# Patient Record
Sex: FEMALE | Race: OTHER | HISPANIC OR LATINO
[De-identification: names, ages, dates, MRNs, and addresses within clinical notes are randomized per-mention and may not be internally consistent; named-entity substitution may affect disease eponyms.]

---

## 2019-12-13 PROBLEM — Z00.00 ENCOUNTER FOR PREVENTIVE HEALTH EXAMINATION: Status: ACTIVE | Noted: 2019-12-13

## 2019-12-16 ENCOUNTER — APPOINTMENT (OUTPATIENT)
Dept: NEUROLOGY | Facility: CLINIC | Age: 79
End: 2019-12-16
Payer: MEDICARE

## 2019-12-16 VITALS
BODY MASS INDEX: 26.21 KG/M2 | SYSTOLIC BLOOD PRESSURE: 161 MMHG | OXYGEN SATURATION: 97 % | HEART RATE: 72 BPM | TEMPERATURE: 98.5 F | DIASTOLIC BLOOD PRESSURE: 70 MMHG | WEIGHT: 130 LBS | HEIGHT: 59 IN

## 2019-12-16 DIAGNOSIS — Z81.8 FAMILY HISTORY OF OTHER MENTAL AND BEHAVIORAL DISORDERS: ICD-10-CM

## 2019-12-16 DIAGNOSIS — Z87.820 PERSONAL HISTORY OF TRAUMATIC BRAIN INJURY: ICD-10-CM

## 2019-12-16 PROCEDURE — 99204 OFFICE O/P NEW MOD 45 MIN: CPT

## 2019-12-17 PROBLEM — Z81.8 FAMILY HISTORY OF DEMENTIA: Status: ACTIVE | Noted: 2019-12-17

## 2019-12-17 PROBLEM — Z87.820 HISTORY OF TRAUMATIC BRAIN INJURY: Status: ACTIVE | Noted: 2019-12-17

## 2019-12-17 RX ORDER — CETIRIZINE HCL 10 MG
TABLET ORAL
Refills: 0 | Status: ACTIVE | COMMUNITY

## 2019-12-17 RX ORDER — ALBUTEROL SULFATE 90 UG/1
AEROSOL, METERED RESPIRATORY (INHALATION)
Refills: 0 | Status: ACTIVE | COMMUNITY

## 2019-12-17 RX ORDER — MONTELUKAST SODIUM 10 MG/1
10 TABLET, FILM COATED ORAL
Refills: 0 | Status: ACTIVE | COMMUNITY

## 2019-12-17 RX ORDER — MECLIZINE HYDROCHLORIDE 12.5 MG/1
12.5 TABLET ORAL DAILY
Refills: 0 | Status: ACTIVE | COMMUNITY

## 2019-12-17 NOTE — DISCUSSION/SUMMARY
[FreeTextEntry1] : VIANCA NEVAREZ is a 79 year old F who present with vertigo and short memory difficulties. On exam she endorsed worsening of vertigo to the right on [place] Hallpike testing. The vertigo is positional, whoever sometimes gets worse on standing and sitting up. Her short term memory is impaired however she did well on other memory tasks. Given the age and symptoms, would like to obtain Brain MRI checking for evidence of vascular dementia, obtain MR angiogram of head and neck checking for evidence of stenosis. Will also check lab work up for reversible etiologies of dementia. \par

## 2019-12-17 NOTE — REVIEW OF SYSTEMS
[Memory Lapses or Loss] : memory loss [Repeating Questions] : repeated questioning about recent events [Lightheadedness] : lightheadedness [Vertigo] : vertigo [Sleep Disturbances] : sleep disturbances [Fever] : no fever [Decr. Concentrating Ability] : no decrease in concentrating ability [Chills] : no chills [Difficulty with Language] : no ~M difficulty with language [Changed Thought Patterns] : no change in thought patterns [Facial Weakness] : no facial weakness [Arm Weakness] : no arm weakness [Numbness] : no numbness [Hand Weakness] : no hand weakness [Tingling] : no tingling [Abnormal Sensation] : no abnormal sensation [Seizures] : no convulsions [Hypersensitivity] : no hypersensitivity [Fainting] : no fainting [Suicidal] : not suicidal [Dizziness] : no dizziness [Anxiety] : no anxiety [Depression] : no depression [Heart Rate Is Slow] : the heart rate was not slow [Loss Of Hearing] : no hearing loss [Heart Rate Is Fast] : the heart rate was not fast [Abdominal Pain] : no abdominal pain [Dysuria] : no dysuria [Incontinence] : no incontinence [Skin Wound] : no skin wound [Hot Flashes] : no hot flashes [Proptosis] : no proptosis

## 2019-12-17 NOTE — HISTORY OF PRESENT ILLNESS
[FreeTextEntry1] : VIANCA NEVAREZ is a 79 year old F who presents for memory decline and dizziness. She is accompanied with her daughter. She reports episode of dizziness and room spinning sensation for the past two years. Her PCP prescribed her Meclizine which helps her. Dizziness gets worse with turning her head to the side and sometimes looking up and standing up. She denies falls or fainting spells. In addition she reports stabbing headache and mild blurred vision in the right side 1-2 per month without other associated symptoms. She denies ear pain. \par \par Her daughter also expressed her concern about the patient short term memory difficulties. For the past few months she has difficulties with short memories, remembering her daily tasks. She looses the track of conversation and repeats the questions. Otherwise she is independent and is able to do her ADLs.   \par

## 2019-12-17 NOTE — PHYSICAL EXAM
[FreeTextEntry1] : Mental status: Awake, alert and oriented x3.  [place] speaker. Poor recent memory. Could not remember the three words. Naming, repetition and comprehension intact.  Attention/concentration intact.  No dysarthria, no aphasia.  Fund of knowledge appropriate. \par Cranial nerves: Pupils equally round and reactive to light, visual fields full, Eng gaze  nystagmus karon , [place] Halpike test revealed worsening of symptoms to the right. Extraocular muscles intact, V1 through V3 intact bilaterally and symmetric, face symmetric, hearing intact to finger rub, palate elevation symmetric, tongue was midline.\par Motor:  MRC grading 5/5 b/l UE/LE.   strength 5/5.  Normal tone and bulk.  No abnormal movements.  \par Sensation: Intact to light touch, proprioception, and pinprick. \par Coordination: No dysmetria on finger-to-nose and heel-to-shin.  No dysdiadokinesia.\par Reflexes: 2+ in bilateral UE/LE, downgoing toes bilaterally. (-) Rothman.\par Gait: Narrow and steady. No ataxia.  Romberg negative\par \par

## 2020-02-24 ENCOUNTER — APPOINTMENT (OUTPATIENT)
Dept: NEUROLOGY | Facility: CLINIC | Age: 80
End: 2020-02-24
Payer: MEDICARE

## 2020-02-24 VITALS
OXYGEN SATURATION: 96 % | SYSTOLIC BLOOD PRESSURE: 153 MMHG | TEMPERATURE: 98.2 F | HEART RATE: 77 BPM | DIASTOLIC BLOOD PRESSURE: 75 MMHG | BODY MASS INDEX: 26.21 KG/M2 | HEIGHT: 59 IN | WEIGHT: 130 LBS

## 2020-02-24 DIAGNOSIS — R41.3 OTHER AMNESIA: ICD-10-CM

## 2020-02-24 DIAGNOSIS — R42 DIZZINESS AND GIDDINESS: ICD-10-CM

## 2020-02-24 PROCEDURE — 99214 OFFICE O/P EST MOD 30 MIN: CPT

## 2020-02-24 NOTE — PHYSICAL EXAM
[FreeTextEntry1] : Mental status: Awake, alert and oriented x3. [place] speaker. Poor recent memory. Could not remember the three words. Naming, repetition and comprehension intact. Attention/concentration intact. No dysarthria, no aphasia. Fund of knowledge appropriate. MOCA test: 21/30. She did not well on recalls and visuospatial testing \par Cranial nerves: Pupils equally round and reactive to light, visual fields full, Eng gaze nystagmus karon , [place] Halpike test revealed worsening of symptoms to the right. Extraocular muscles intact, V1 through V3 intact bilaterally and symmetric, face symmetric, hearing intact to finger rub, palate elevation symmetric, tongue was midline.\par Motor: MRC grading 5/5 b/l UE/LE.  strength 5/5. Normal tone and bulk. No abnormal movements. \par Sensation: Intact to light touch, proprioception, and pinprick. \par Coordination: No dysmetria on finger-to-nose and heel-to-shin. No dysdiadokinesia.\par Reflexes: 2+ in bilateral UE/LE, downgoing toes bilaterally. (-) Rothman.\par Gait: Narrow and steady. No ataxia. Romberg negative\par

## 2020-02-24 NOTE — DATA REVIEWED
[de-identified] : Brain MRI: mild cortical volume loss and mild periventricular and subcortical small ischemic changes

## 2020-02-24 NOTE — HISTORY OF PRESENT ILLNESS
[FreeTextEntry1] : 79 year old F is here as a follow up visit for memory decline and dizziness. She reports episode of dizziness and room spinning sensation for the past two years. Her PCP prescribed her Meclizine which helps her. Dizziness gets worse with turning her head to the side and sometimes looking up and standing up.  They also expressed their concern about the patient's short term memory difficulties for the past few months/. She is stable in general but they reports few instances that she had difficulties with short memories and remembering her daily tasks. Since last visit Brain MRI showed mild cortical volume loss and mild periventricular and subcortical small ischemic changes. Her lab work up showed normal B12, TSH and RPR. She still has episodes of worsening memory and occasional dizziness stable with moderate response to meclizine. \par

## 2020-02-24 NOTE — ASSESSMENT
[FreeTextEntry1] : VIANCA NEVAREZ is a 79 year old F who present with vertigo and short memory difficulties. Her Brain MRI showed mild volume loss and white matter disease. Her MOCA test is 21/30. I suspect that part of her memory issue would be due to mood and anxiety for which I would like her to do neuropsychiatric testing. For peripheral vertigo, I would refer her to vestibular rehab and will continue meclizine for symptomatic management \par \par - Vestibular rehab\par - Neuropsychiatric evaluation \par - RTC in 2 months \par  \par

## 2020-03-02 NOTE — REVIEW OF SYSTEMS
-- DO NOT REPLY / DO NOT REPLY ALL --  -- Message is from the [facility]--    General Patient Message      Reason for Call: FYI - patient has 0820 appt tomorrow with you. Patient declined an ED disposition and insisted on scheduling an office appointment. She has no PCP. [phone]. Thank you    Caller Information       Type Contact Phone    03/02/2020 04:43 PM Phone (Incoming) Livier Kearns (Self) [phone] (H)          Alternative phone number: [phone]    Turnaround time given to caller:   \"This message will be sent to [state Provider's name]. The clinical team will fulfill your request as soon as they review your message when the office opens tomorrow.\"}    
[As Noted in HPI] : as noted in HPI

## 2020-03-05 ENCOUNTER — OUTPATIENT (OUTPATIENT)
Dept: OUTPATIENT SERVICES | Facility: HOSPITAL | Age: 80
LOS: 1 days | Discharge: HOME | End: 2020-03-05
Payer: MEDICARE

## 2020-03-05 DIAGNOSIS — R91.8 OTHER NONSPECIFIC ABNORMAL FINDING OF LUNG FIELD: ICD-10-CM

## 2020-03-05 PROCEDURE — 71250 CT THORAX DX C-: CPT | Mod: 26

## 2020-05-04 ENCOUNTER — APPOINTMENT (OUTPATIENT)
Dept: NEUROLOGY | Facility: CLINIC | Age: 80
End: 2020-05-04

## 2020-06-01 ENCOUNTER — APPOINTMENT (OUTPATIENT)
Dept: NEUROLOGY | Facility: CLINIC | Age: 80
End: 2020-06-01

## 2020-10-26 ENCOUNTER — OUTPATIENT (OUTPATIENT)
Dept: OUTPATIENT SERVICES | Facility: HOSPITAL | Age: 80
LOS: 1 days | Discharge: HOME | End: 2020-10-26

## 2020-10-26 DIAGNOSIS — G31.84 MILD COGNITIVE IMPAIRMENT OF UNCERTAIN OR UNKNOWN ETIOLOGY: ICD-10-CM

## 2021-03-04 ENCOUNTER — APPOINTMENT (OUTPATIENT)
Dept: NEUROLOGY | Facility: CLINIC | Age: 81
End: 2021-03-04

## 2021-04-21 DIAGNOSIS — F01.50 VASCULAR DEMENTIA W/OUT BEHAVIORAL DISTURBANCE: ICD-10-CM

## 2021-04-21 RX ORDER — DONEPEZIL HYDROCHLORIDE 5 MG/1
5 TABLET ORAL
Qty: 30 | Refills: 5 | Status: ACTIVE | COMMUNITY
Start: 2021-04-21 | End: 1900-01-01

## 2021-04-21 RX ORDER — ASPIRIN 81 MG/1
81 TABLET, CHEWABLE ORAL
Qty: 90 | Refills: 2 | Status: ACTIVE | COMMUNITY
Start: 2021-04-21 | End: 1900-01-01

## 2022-01-10 ENCOUNTER — APPOINTMENT (OUTPATIENT)
Dept: NEUROLOGY | Facility: CLINIC | Age: 82
End: 2022-01-10

## 2022-06-27 ENCOUNTER — APPOINTMENT (OUTPATIENT)
Dept: ORTHOPEDIC SURGERY | Facility: CLINIC | Age: 82
End: 2022-06-27

## 2022-06-27 PROCEDURE — 73562 X-RAY EXAM OF KNEE 3: CPT | Mod: 50

## 2022-06-27 PROCEDURE — 20611 DRAIN/INJ JOINT/BURSA W/US: CPT | Mod: 50

## 2022-06-27 PROCEDURE — 99213 OFFICE O/P EST LOW 20 MIN: CPT | Mod: 25

## 2022-06-27 RX ORDER — MOMETASONE FUROATE 1 MG/G
0.1 CREAM TOPICAL
Qty: 30 | Refills: 0 | Status: COMPLETED | COMMUNITY
Start: 2021-08-30

## 2022-06-27 RX ORDER — EZETIMIBE 10 MG/1
10 TABLET ORAL
Qty: 90 | Refills: 0 | Status: COMPLETED | COMMUNITY
Start: 2021-09-20

## 2022-06-27 RX ORDER — AZELASTINE HYDROCHLORIDE 137 UG/1
0.1 SPRAY, METERED NASAL
Qty: 30 | Refills: 0 | Status: COMPLETED | COMMUNITY
Start: 2022-01-25

## 2022-06-27 RX ORDER — RAMIPRIL 5 MG/1
5 CAPSULE ORAL
Qty: 90 | Refills: 0 | Status: COMPLETED | COMMUNITY
Start: 2021-09-20

## 2022-06-27 RX ORDER — FLUTICASONE FUROATE AND VILANTEROL TRIFENATATE 100; 25 UG/1; UG/1
100-25 POWDER RESPIRATORY (INHALATION)
Qty: 180 | Refills: 0 | Status: COMPLETED | COMMUNITY
Start: 2021-06-21

## 2022-06-27 NOTE — DATA REVIEWED
[Bilateral] : of the bilateral [Knee] : knee [FreeTextEntry1] :  X-rays of both knees were obtained today.  There were compared to x-rays taken in 2021 of the left knee and 2020 of the right knee.\par \par Right knee shows slightly progressed osteoarthritis in the lateral compartment.  There is spurring off the lateral femoral condyle and the medial tibia.  This x-ray was compared to the right knee x-ray taken from 2020.\par Left knee shows moderate to severe osteoarthritis of the medial compartment.  This has progressed since February 2021 x-ray.

## 2022-06-27 NOTE — DISCUSSION/SUMMARY
[de-identified] : Today we discussed a cortisone injection for each knee.  Both knees were injected with cortisone, procedure note generated.  She will obtain an MRI of each knee.  Rx provided for that.  They will call me 2 days after the MRI is performed so we can discuss the appointment.  She has a follow-up appointment in September 2022 with Dr. Balderas and will keep that appointment.

## 2022-06-27 NOTE — PROCEDURE
[Large Joint Injection] : Large joint injection [Bilateral] : bilaterally of the [Knee] : knee [___ cc    1%] : Lidocaine ~Vcc of 1%  [___ cc    4mg] : Dexamethasone (Decadron) ~Vcc of 4 mg  [All ultrasound images have been permanently captured and stored accordingly in our picture archiving and communication system] : All ultrasound images have been permanently captured and stored accordingly in our picture archiving and communication system [Visualization of the needle and placement of injection was performed without complication] : visualization of the needle and placement of injection was performed without complication

## 2022-06-27 NOTE — PHYSICAL EXAM
[Bilateral] : knee bilaterally [NL (0)] : extension 0 degrees [] : patient ambulates with assistive device [FreeTextEntry8] :  Medial joint tenderness to palpation of both knees, more pronounced on the right knee. [FreeTextEntry9] :   Range of motion assessed with the patient sitting up on the exam table. [TWNoteComboBox7] : flexion 90 degrees

## 2022-09-12 ENCOUNTER — APPOINTMENT (OUTPATIENT)
Dept: ORTHOPEDIC SURGERY | Facility: CLINIC | Age: 82
End: 2022-09-12

## 2022-11-03 DIAGNOSIS — N81.10 CYSTOCELE, UNSPECIFIED: ICD-10-CM

## 2022-11-03 DIAGNOSIS — Z78.9 OTHER SPECIFIED HEALTH STATUS: ICD-10-CM

## 2023-03-08 ENCOUNTER — INPATIENT (INPATIENT)
Facility: HOSPITAL | Age: 83
LOS: 6 days | Discharge: HOME HEALTH | DRG: 306 | End: 2023-03-15
Attending: HOSPITALIST | Admitting: HOSPITALIST
Payer: MEDICARE

## 2023-03-08 VITALS
TEMPERATURE: 98 F | OXYGEN SATURATION: 98 % | DIASTOLIC BLOOD PRESSURE: 67 MMHG | SYSTOLIC BLOOD PRESSURE: 167 MMHG | RESPIRATION RATE: 20 BRPM | HEART RATE: 77 BPM

## 2023-03-08 DIAGNOSIS — R06.02 SHORTNESS OF BREATH: ICD-10-CM

## 2023-03-08 LAB
ALBUMIN SERPL ELPH-MCNC: 4.2 G/DL — SIGNIFICANT CHANGE UP (ref 3.5–5.2)
ALP SERPL-CCNC: 65 U/L — SIGNIFICANT CHANGE UP (ref 30–115)
ALT FLD-CCNC: 19 U/L — SIGNIFICANT CHANGE UP (ref 0–41)
ANION GAP SERPL CALC-SCNC: 11 MMOL/L — SIGNIFICANT CHANGE UP (ref 7–14)
AST SERPL-CCNC: 19 U/L — SIGNIFICANT CHANGE UP (ref 0–41)
BASE EXCESS BLDV CALC-SCNC: -0.9 MMOL/L — SIGNIFICANT CHANGE UP (ref -2–3)
BASOPHILS # BLD AUTO: 0.01 K/UL — SIGNIFICANT CHANGE UP (ref 0–0.2)
BASOPHILS NFR BLD AUTO: 0.2 % — SIGNIFICANT CHANGE UP (ref 0–1)
BILIRUB SERPL-MCNC: 0.3 MG/DL — SIGNIFICANT CHANGE UP (ref 0.2–1.2)
BUN SERPL-MCNC: 24 MG/DL — HIGH (ref 10–20)
CA-I SERPL-SCNC: 1.18 MMOL/L — SIGNIFICANT CHANGE UP (ref 1.15–1.33)
CALCIUM SERPL-MCNC: 8.8 MG/DL — SIGNIFICANT CHANGE UP (ref 8.4–10.4)
CHLORIDE SERPL-SCNC: 103 MMOL/L — SIGNIFICANT CHANGE UP (ref 98–110)
CO2 SERPL-SCNC: 22 MMOL/L — SIGNIFICANT CHANGE UP (ref 17–32)
CREAT SERPL-MCNC: 0.6 MG/DL — LOW (ref 0.7–1.5)
EGFR: 88 ML/MIN/1.73M2 — SIGNIFICANT CHANGE UP
EOSINOPHIL # BLD AUTO: 0.02 K/UL — SIGNIFICANT CHANGE UP (ref 0–0.7)
EOSINOPHIL NFR BLD AUTO: 0.3 % — SIGNIFICANT CHANGE UP (ref 0–8)
FLUAV AG NPH QL: SIGNIFICANT CHANGE UP
FLUBV AG NPH QL: SIGNIFICANT CHANGE UP
GAS PNL BLDV: 134 MMOL/L — LOW (ref 136–145)
GAS PNL BLDV: SIGNIFICANT CHANGE UP
GAS PNL BLDV: SIGNIFICANT CHANGE UP
GLUCOSE SERPL-MCNC: 138 MG/DL — HIGH (ref 70–99)
HCO3 BLDV-SCNC: 24 MMOL/L — SIGNIFICANT CHANGE UP (ref 22–29)
HCT VFR BLD CALC: 32.3 % — LOW (ref 37–47)
HCT VFR BLDA CALC: 34 % — LOW (ref 39–51)
HGB BLD CALC-MCNC: 11.4 G/DL — LOW (ref 12.6–17.4)
HGB BLD-MCNC: 10.8 G/DL — LOW (ref 12–16)
IMM GRANULOCYTES NFR BLD AUTO: 0.2 % — SIGNIFICANT CHANGE UP (ref 0.1–0.3)
LACTATE BLDV-MCNC: 0.9 MMOL/L — SIGNIFICANT CHANGE UP (ref 0.5–2)
LYMPHOCYTES # BLD AUTO: 0.66 K/UL — LOW (ref 1.2–3.4)
LYMPHOCYTES # BLD AUTO: 11.4 % — LOW (ref 20.5–51.1)
MCHC RBC-ENTMCNC: 31.3 PG — HIGH (ref 27–31)
MCHC RBC-ENTMCNC: 33.4 G/DL — SIGNIFICANT CHANGE UP (ref 32–37)
MCV RBC AUTO: 93.6 FL — SIGNIFICANT CHANGE UP (ref 81–99)
MONOCYTES # BLD AUTO: 0.13 K/UL — SIGNIFICANT CHANGE UP (ref 0.1–0.6)
MONOCYTES NFR BLD AUTO: 2.2 % — SIGNIFICANT CHANGE UP (ref 1.7–9.3)
NEUTROPHILS # BLD AUTO: 4.98 K/UL — SIGNIFICANT CHANGE UP (ref 1.4–6.5)
NEUTROPHILS NFR BLD AUTO: 85.7 % — HIGH (ref 42.2–75.2)
NRBC # BLD: 0 /100 WBCS — SIGNIFICANT CHANGE UP (ref 0–0)
NT-PROBNP SERPL-SCNC: 1085 PG/ML — HIGH (ref 0–300)
PCO2 BLDV: 37 MMHG — LOW (ref 39–42)
PH BLDV: 7.41 — SIGNIFICANT CHANGE UP (ref 7.32–7.43)
PLATELET # BLD AUTO: 174 K/UL — SIGNIFICANT CHANGE UP (ref 130–400)
PO2 BLDV: 74 MMHG — SIGNIFICANT CHANGE UP
POTASSIUM BLDV-SCNC: 4.5 MMOL/L — SIGNIFICANT CHANGE UP (ref 3.5–5.1)
POTASSIUM SERPL-MCNC: 4.5 MMOL/L — SIGNIFICANT CHANGE UP (ref 3.5–5)
POTASSIUM SERPL-SCNC: 4.5 MMOL/L — SIGNIFICANT CHANGE UP (ref 3.5–5)
PROT SERPL-MCNC: 7.1 G/DL — SIGNIFICANT CHANGE UP (ref 6–8)
RBC # BLD: 3.45 M/UL — LOW (ref 4.2–5.4)
RBC # FLD: 13.6 % — SIGNIFICANT CHANGE UP (ref 11.5–14.5)
RSV RNA NPH QL NAA+NON-PROBE: SIGNIFICANT CHANGE UP
SAO2 % BLDV: 96.1 % — SIGNIFICANT CHANGE UP
SARS-COV-2 RNA SPEC QL NAA+PROBE: SIGNIFICANT CHANGE UP
SODIUM SERPL-SCNC: 136 MMOL/L — SIGNIFICANT CHANGE UP (ref 135–146)
TROPONIN T SERPL-MCNC: <0.01 NG/ML — SIGNIFICANT CHANGE UP
WBC # BLD: 5.81 K/UL — SIGNIFICANT CHANGE UP (ref 4.8–10.8)
WBC # FLD AUTO: 5.81 K/UL — SIGNIFICANT CHANGE UP (ref 4.8–10.8)

## 2023-03-08 PROCEDURE — 80061 LIPID PANEL: CPT

## 2023-03-08 PROCEDURE — 85610 PROTHROMBIN TIME: CPT

## 2023-03-08 PROCEDURE — 84100 ASSAY OF PHOSPHORUS: CPT

## 2023-03-08 PROCEDURE — 83036 HEMOGLOBIN GLYCOSYLATED A1C: CPT

## 2023-03-08 PROCEDURE — 93306 TTE W/DOPPLER COMPLETE: CPT

## 2023-03-08 PROCEDURE — 71045 X-RAY EXAM CHEST 1 VIEW: CPT

## 2023-03-08 PROCEDURE — 85652 RBC SED RATE AUTOMATED: CPT

## 2023-03-08 PROCEDURE — 80053 COMPREHEN METABOLIC PANEL: CPT

## 2023-03-08 PROCEDURE — 93005 ELECTROCARDIOGRAM TRACING: CPT

## 2023-03-08 PROCEDURE — 93010 ELECTROCARDIOGRAM REPORT: CPT

## 2023-03-08 PROCEDURE — 97110 THERAPEUTIC EXERCISES: CPT | Mod: GP

## 2023-03-08 PROCEDURE — 99285 EMERGENCY DEPT VISIT HI MDM: CPT | Mod: FS

## 2023-03-08 PROCEDURE — 83735 ASSAY OF MAGNESIUM: CPT

## 2023-03-08 PROCEDURE — 93970 EXTREMITY STUDY: CPT

## 2023-03-08 PROCEDURE — 93320 DOPPLER ECHO COMPLETE: CPT

## 2023-03-08 PROCEDURE — 99222 1ST HOSP IP/OBS MODERATE 55: CPT | Mod: GC

## 2023-03-08 PROCEDURE — U0005: CPT

## 2023-03-08 PROCEDURE — 82652 VIT D 1 25-DIHYDROXY: CPT

## 2023-03-08 PROCEDURE — 85730 THROMBOPLASTIN TIME PARTIAL: CPT

## 2023-03-08 PROCEDURE — 94640 AIRWAY INHALATION TREATMENT: CPT

## 2023-03-08 PROCEDURE — 82306 VITAMIN D 25 HYDROXY: CPT

## 2023-03-08 PROCEDURE — 97162 PT EVAL MOD COMPLEX 30 MIN: CPT | Mod: GP

## 2023-03-08 PROCEDURE — 85025 COMPLETE CBC W/AUTO DIFF WBC: CPT

## 2023-03-08 PROCEDURE — 86140 C-REACTIVE PROTEIN: CPT

## 2023-03-08 PROCEDURE — U0003: CPT

## 2023-03-08 PROCEDURE — 97116 GAIT TRAINING THERAPY: CPT | Mod: GP

## 2023-03-08 PROCEDURE — 93325 DOPPLER ECHO COLOR FLOW MAPG: CPT

## 2023-03-08 PROCEDURE — 71045 X-RAY EXAM CHEST 1 VIEW: CPT | Mod: 26

## 2023-03-08 PROCEDURE — 93312 ECHO TRANSESOPHAGEAL: CPT

## 2023-03-08 PROCEDURE — 84484 ASSAY OF TROPONIN QUANT: CPT

## 2023-03-08 PROCEDURE — 36415 COLL VENOUS BLD VENIPUNCTURE: CPT

## 2023-03-08 RX ORDER — IPRATROPIUM/ALBUTEROL SULFATE 18-103MCG
3 AEROSOL WITH ADAPTER (GRAM) INHALATION
Refills: 0 | Status: COMPLETED | OUTPATIENT
Start: 2023-03-08 | End: 2023-03-08

## 2023-03-08 RX ORDER — FUROSEMIDE 40 MG
40 TABLET ORAL ONCE
Refills: 0 | Status: COMPLETED | OUTPATIENT
Start: 2023-03-08 | End: 2023-03-08

## 2023-03-08 RX ORDER — MECLIZINE HCL 12.5 MG
25 TABLET ORAL DAILY
Refills: 0 | Status: DISCONTINUED | OUTPATIENT
Start: 2023-03-08 | End: 2023-03-15

## 2023-03-08 RX ORDER — ALBUTEROL 90 UG/1
2 AEROSOL, METERED ORAL EVERY 6 HOURS
Refills: 0 | Status: DISCONTINUED | OUTPATIENT
Start: 2023-03-08 | End: 2023-03-15

## 2023-03-08 RX ORDER — MAGNESIUM SULFATE 500 MG/ML
2 VIAL (ML) INJECTION ONCE
Refills: 0 | Status: COMPLETED | OUTPATIENT
Start: 2023-03-08 | End: 2023-03-08

## 2023-03-08 RX ORDER — IPRATROPIUM/ALBUTEROL SULFATE 18-103MCG
3 AEROSOL WITH ADAPTER (GRAM) INHALATION EVERY 6 HOURS
Refills: 0 | Status: DISCONTINUED | OUTPATIENT
Start: 2023-03-08 | End: 2023-03-15

## 2023-03-08 RX ORDER — ENOXAPARIN SODIUM 100 MG/ML
40 INJECTION SUBCUTANEOUS EVERY 24 HOURS
Refills: 0 | Status: DISCONTINUED | OUTPATIENT
Start: 2023-03-08 | End: 2023-03-15

## 2023-03-08 RX ORDER — PANTOPRAZOLE SODIUM 20 MG/1
40 TABLET, DELAYED RELEASE ORAL
Refills: 0 | Status: DISCONTINUED | OUTPATIENT
Start: 2023-03-08 | End: 2023-03-15

## 2023-03-08 RX ORDER — FUROSEMIDE 40 MG
40 TABLET ORAL EVERY 12 HOURS
Refills: 0 | Status: DISCONTINUED | OUTPATIENT
Start: 2023-03-08 | End: 2023-03-11

## 2023-03-08 RX ORDER — SENNA PLUS 8.6 MG/1
2 TABLET ORAL AT BEDTIME
Refills: 0 | Status: DISCONTINUED | OUTPATIENT
Start: 2023-03-08 | End: 2023-03-15

## 2023-03-08 RX ADMIN — Medication 3 MILLILITER(S): at 17:01

## 2023-03-08 RX ADMIN — Medication 3 MILLILITER(S): at 15:11

## 2023-03-08 RX ADMIN — Medication 40 MILLIGRAM(S): at 17:01

## 2023-03-08 RX ADMIN — Medication 150 GRAM(S): at 17:02

## 2023-03-08 RX ADMIN — Medication 3 MILLILITER(S): at 20:08

## 2023-03-08 RX ADMIN — Medication 3 MILLILITER(S): at 16:55

## 2023-03-08 RX ADMIN — Medication 125 MILLIGRAM(S): at 15:11

## 2023-03-08 RX ADMIN — Medication 3 MILLILITER(S): at 16:51

## 2023-03-08 NOTE — H&P ADULT - HISTORY OF PRESENT ILLNESS
*[place] speaker, interpretation by daughter at bedside*    83-year-old female with history of asthma, vertigo presents for SOB. At baseline, patient uses a walker for ambulation. As per patient no history of previous asthma exacerbation or intubations. No personal history of smoking, no exposure to second hand smoke.    Patient was in her usual state of health 3 days ago when she started feeling SOB (increases with exertion, relieved at rest), associated with cough w/yellow phlegm, headache (mild to moderate, increases with coughing, band like, new), chest pain (central location, feels like blowing up, occurs only with cough, not associated with exertion or rest, 7/10 at it's worst), lower extremity swelling. Denies Fever, chills, rigors, dizziness, NVD, abdominal pain, change in urination and change in bowel habits.     2 days ago patient was prescribed prednisone by her PCP which was initially helping with symptoms but today symptoms progressed and are at it's worst, for which she presented to ED. Negative flu/COVID test OP.    Patient endorsing that she had a heart scan done which showed leaky valve.    In the ED, CXR shows L>R pleural effusion and congestion, BNP 1085, Trops -ve, pCO2 37 on VBG. Clinically looks non-toxic on RA.  Vital Signs Last 24 Hrs:  T(F): 98.1 (08 Mar 2023 12:38), Max: 98.1 (08 Mar 2023 12:38)  HR: 77 (08 Mar 2023 12:38) (77 - 77)  BP: 167/67 (08 Mar 2023 12:38) (167/67 - 167/67)  RR: 20 (08 Mar 2023 12:38) (20 - 20)  SpO2: 98% (08 Mar 2023 12:38) (98% - 98%) on RA

## 2023-03-08 NOTE — H&P ADULT - NSTOBACCOSCREENHP_GEN_A_NCS
Left detailed message regarding our inability to write the letter he's requesting--on Jon's designated phone, as directed by Jon.     No

## 2023-03-08 NOTE — H&P ADULT - NSHPPHYSICALEXAM_GEN_ALL_CORE
GENERAL: NAD, lying in bed comfortably  CHEST/LUNG:  Unlabored respirations. B/L wheezing and crackles +ve  HEART: Regular rate and rhythm; No murmurs, rubs, or gallops  ABDOMEN: Soft, Nontender, Nondistended  EXTREMITIES:  No clubbing, cyanosis, or edema  NERVOUS SYSTEM:  Alert & Oriented X3, speech clear. No deficits

## 2023-03-08 NOTE — ED PROVIDER NOTE - PHYSICAL EXAMINATION
Vital Signs: I have reviewed the initial vital signs.  Constitutional: NAD, well-nourished, appears stated age, no acute distress.  HEENT: Airway patent, moist MM, no erythema/swelling/deformity of oral structures. EOMI, PERRLA.  CV: regular rate, regular rhythm, well-perfused extremities, 2+ b/l DP and radial pulses equal.  Lungs: +diffuse crackles/wheezing, no increased WOB.  ABD: NTND, no guarding or rebound, no pulsatile mass, no hernias.   MSK: Neck supple, nontender, nl ROM, no stepoff. Chest nontender. Back nontender. Ext nontender, nl rom, no deformity.   INTEG: Skin warm, dry, no rash.  NEURO: A&Ox3, normal strength, nl sensation throughout, normal speech.   PSYCH: Calm, cooperative, normal affect and interaction.

## 2023-03-08 NOTE — ED PROVIDER NOTE - ATTENDING APP SHARED VISIT CONTRIBUTION OF CARE
83-year-old female with history of vertigo, asthma on daily inhaler presents today with shortness of breath.  Endorses 4 days of shortness of breath as well as productive cough with yellow sputum.  Denies any fevers.  Denies any chest pain.  Went to her PMD who prescribed prednisone as well as albuterol pump.  Patient has been taking it as prescribed without much improvement.    CONSTITUTIONAL: Well-developed; well-nourished; in no acute distress.   SKIN: warm, dry  HEAD: Normocephalic; atraumatic.  EYES: PERRL, EOMI, no conjunctival erythema  ENT: No nasal discharge; airway clear.  NECK: Supple; non tender.  CARD: S1, S2 normal; no murmurs, gallops, or rubs. Regular rate and rhythm.   RESP: mild tachypnea, speaking full sentences, wheezing bilaterally.   ABD: soft ntnd.  EXT: Normal ROM.  1+ pitting edema to the LEs.   NEURO: Alert, oriented, grossly unremarkable  PSYCH: Cooperative, appropriate.

## 2023-03-08 NOTE — H&P ADULT - ASSESSMENT
83-year-old female with history of asthma, vertigo presents for SOB. At baseline, patient uses a walker for ambulation. As per patient no history of previous asthma exacerbation or intubations. No personal history of smoking, no exposure to second hand smoke.    #Suspected Asthma exacerbation  -H/o chronic controlled Asthma as per patient  -No exposure to smoke, VBG shows normal Co2, but wheezing (cardiac origin ?)  -s/p Magnesium, Solumedrol 125mg  -Start Solumedrol 60mg IV BID  -Start Duoneb q6, Albuterol PRN  -Vitamin D level    #Suspected new onset HF  -BNP 1085, CXR shows L>R pleural effusion and cardiomegaly  -Trops -ve, EKG shows tall T wave in lead 1 and 2, possible LVH  -Trops AM, Repeat EKG  -TTE  -Start Lasix 40mg BID  -Strict I&Os  -Tele monitoring for IV diuresis  -Will need ischemic workup OP  -Repeat CXR, if L effusion does not resolve will need diagnostic tap    #Misc  -DVT prophylaxis: Lovenox SQ  -GI prophylaxis: Protonix  -Diet: Regular  -Code status: Full code  -Activity: IAT  -Bowel regimen: Senna  -Dispo: Tele    -Med rec confirmed with patient 83-year-old female with history of asthma, vertigo presents for SOB. At baseline, patient uses a walker for ambulation. As per patient no history of previous asthma exacerbation or intubations. No personal history of smoking, no exposure to second hand smoke.    #Suspected COPD/Asthma exacerbation  -H/o chronic controlled Asthma as per patient  -No exposure to smoke, VBG shows normal Co2, but wheezing (cardiac origin ?)  -s/p Magnesium, Solumedrol 125mg  -Start Solumedrol 60mg IV BID  -Start Duoneb q6, Albuterol PRN  -Vitamin D level  -Pulmonary c/s Dr. Marah Cuevas    #Suspected new onset HF  -BNP 1085, CXR shows L>R pleural effusion and cardiomegaly  -Trops -ve, EKG shows tall T wave in lead 1 and 2, possible LVH  -Trops AM, Repeat EKG  -Stress test from this year -ve as per daughter  -TTE OP from 2/2023 shows severe MR- follow with Dr. Hatfield  -TTE  -Start Lasix 40mg BID  -Strict I&Os  -Tele monitoring for IV diuresis  -Will need ischemic workup OP  -Repeat CXR, if L effusion does not resolve will need diagnostic tap  -Cardiology c/s Dr. Hatfield for MV clip/repair/replacement    #Misc  -DVT prophylaxis: Lovenox SQ  -GI prophylaxis: Protonix  -Diet: Regular  -Code status: Full code  -Activity: IAT  -Bowel regimen: Senna  -Dispo: Tele    -Med rec confirmed with patient 83-year-old female with history of asthma, vertigo presents for SOB. At baseline, patient uses a walker for ambulation. As per patient no history of previous asthma exacerbation or intubations. No personal history of smoking, no exposure to second hand smoke.    #Suspected COPD/Asthma exacerbation  -H/o chronic controlled Asthma as per patient  -No exposure to smoke, VBG shows normal Co2, but wheezing (cardiac origin ?)  -s/p Magnesium, Solumedrol 125mg  -Start Solumedrol 60mg IV BID  -Start Duoneb q6, Albuterol PRN  -Vitamin D level  -Pulmonary c/s Dr. Marah Cuevas    #Suspected new onset HF  -BNP 1085, CXR shows L>R trace pleural effusion and cardiomegaly on wet read- f/u official  -Trops -ve, EKG shows tall T wave in lead 1 and 2, possible LVH  -Trops AM, Repeat EKG  -Stress test from this year -ve as per daughter  -TTE OP from 2/2023 shows severe MR- follow with Dr. Hatfield  -TTE  -Start Lasix 40mg BID  -Strict I&Os  -Tele monitoring for IV diuresis  -Will need ischemic workup OP  -Repeat CXR, if L effusion does not resolve will need diagnostic tap  -Cardiology c/s Dr. Hatfield for MV clip/repair/replacement    #Misc  -DVT prophylaxis: Lovenox SQ  -GI prophylaxis: Protonix  -Diet: Regular  -Code status: Full code  -Activity: IAT  -Bowel regimen: Senna  -Dispo: Tele    -Med rec confirmed with patient

## 2023-03-08 NOTE — ED ADULT NURSE NOTE - OBJECTIVE STATEMENT
hx of asthma, ran out of albuterol pump  speaks full sentences, resting on the stretcher  denies n/v/d/

## 2023-03-08 NOTE — ED PROVIDER NOTE - CLINICAL SUMMARY MEDICAL DECISION MAKING FREE TEXT BOX
83-year-old female with history of vertigo and asthma presenting with 4 days of shortness of breath associated with cough and wheeze.  Already has tried oral prednisone for the last 2 days, albuterol as needed without improvement.  Noted with tachypnea, wheezing. Labs with normal CBC/cmp, elevated BNP, CXR with vascular congestion. noted with pitting edema to LEs. possible CHF, given lasix IV. Treated with albuterol, solumedrol with mild improvement however persistently symptomatic-  admitted for further management.

## 2023-03-08 NOTE — ED ADULT NURSE NOTE - CAS EDN DISCHARGE ASSESSMENT
Alert and oriented to person, place and time v/s stable, patient able to ambulate from stretcher to bed/Alert and oriented to person, place and time/No adverse reaction to first time med in ED

## 2023-03-08 NOTE — H&P ADULT - ATTENDING COMMENTS
HPI:  *Nepali speaker, interpretation by daughter at bedside*    83-year-old female with history of asthma, vertigo presents for SOB. At baseline, patient uses a walker for ambulation. As per patient no history of previous asthma exacerbation or intubations. No personal history of smoking, no exposure to second hand smoke.    Patient was in her usual state of health 3 days ago when she started feeling SOB (increases with exertion, relieved at rest), associated with cough w/yellow phlegm, headache (mild to moderate, increases with coughing, band like, new), chest pain (central location, feels like blowing up, occurs only with cough, not associated with exertion or rest, 7/10 at it's worst), lower extremity swelling. Denies Fever, chills, rigors, dizziness, NVD, abdominal pain, change in urination and change in bowel habits.     2 days ago patient was prescribed prednisone by her PCP which was initially helping with symptoms but today symptoms progressed and are at it's worst, for which she presented to ED. Negative flu/COVID test OP.    Patient endorsing that she had a heart scan done which showed leaky valve.    In the ED, CXR shows L>R pleural effusion and congestion, BNP 1085, Trops -ve, pCO2 37 on VBG. Clinically looks non-toxic on RA.  Vital Signs Last 24 Hrs:  T(F): 98.1 (08 Mar 2023 12:38), Max: 98.1 (08 Mar 2023 12:38)  HR: 77 (08 Mar 2023 12:38) (77 - 77)  BP: 167/67 (08 Mar 2023 12:38) (167/67 - 167/67)  RR: 20 (08 Mar 2023 12:38) (20 - 20)  SpO2: 98% (08 Mar 2023 12:38) (98% - 98%) on RA   (08 Mar 2023 21:25)    REVIEW OF SYSTEMS: see cc/HPI   CONSTITUTIONAL: No weakness, fevers or chills  EYES/ENT: No visual changes;  No vertigo or throat pain   NECK: No pain or stiffness  RESPIRATORY: (+) cough, wheezing, hemoptysis; (+) shortness of breath  CARDIOVASCULAR: No chest pain or palpitations  GASTROINTESTINAL: No abdominal or epigastric pain. No nausea, vomiting, or hematemesis; No diarrhea or constipation. No melena or hematochezia.  GENITOURINARY: No dysuria, frequency or hematuria  NEUROLOGICAL: No numbness or weakness  SKIN: No itching, rashes    Physical Exam:  General: WN/WD NAD  Neurology: A&Ox3, nonfocal, follows commands  Eyes: PERRLA/ EOMI  ENT/Neck: Neck supple, trachea midline, No JVD  Respiratory: CTA B/L, No wheezing, rales, rhonchi  CV: Normal rate regular rhythm, S1S2, no murmurs, rubs or gallops  Abdominal: Soft, NT, ND +BS,   Extremities: No edema, + peripheral pulses  Skin: No Rashes, Hematoma, Ecchymosis  Incisions:   Tubes:    A/p HPI:  *Sinhala speaker, interpretation by daughter at bedside*    83-year-old female with history of asthma, vertigo presents for SOB. At baseline, patient uses a walker for ambulation. As per patient no history of previous asthma exacerbation or intubations. No personal history of smoking, no exposure to second hand smoke.    Patient was in her usual state of health 3 days ago when she started feeling SOB (increases with exertion, relieved at rest), associated with cough w/yellow phlegm, headache (mild to moderate, increases with coughing, band like, new), chest pain (central location, feels like blowing up, occurs only with cough, not associated with exertion or rest, 7/10 at it's worst), lower extremity swelling. Denies Fever, chills, rigors, dizziness, NVD, abdominal pain, change in urination and change in bowel habits.     2 days ago patient was prescribed prednisone by her PCP which was initially helping with symptoms but today symptoms progressed and are at it's worst, for which she presented to ED. Negative flu/COVID test OP.    Patient endorsing that she had a heart scan done which showed leaky valve.    In the ED, CXR shows L>R pleural effusion and congestion, BNP 1085, Trops -ve, pCO2 37 on VBG. Clinically looks non-toxic on RA.  Vital Signs Last 24 Hrs:  T(F): 98.1 (08 Mar 2023 12:38), Max: 98.1 (08 Mar 2023 12:38)  HR: 77 (08 Mar 2023 12:38) (77 - 77)  BP: 167/67 (08 Mar 2023 12:38) (167/67 - 167/67)  RR: 20 (08 Mar 2023 12:38) (20 - 20)  SpO2: 98% (08 Mar 2023 12:38) (98% - 98%) on RA   (08 Mar 2023 21:25)    REVIEW OF SYSTEMS: see cc/HPI   CONSTITUTIONAL: No weakness, fevers or chills  EYES/ENT: No visual changes;  No vertigo or throat pain   NECK: No pain or stiffness  RESPIRATORY: (+) cough, wheezing, hemoptysis; (+) shortness of breath  CARDIOVASCULAR: No chest pain or palpitations  GASTROINTESTINAL: No abdominal or epigastric pain. No nausea, vomiting, or hematemesis; No diarrhea or constipation. No melena or hematochezia.  GENITOURINARY: No dysuria, frequency or hematuria  NEUROLOGICAL: No numbness or weakness  SKIN: No itching, rashes    Physical Exam:  General: WN/WD NAD  Neurology: A&Ox3, nonfocal, follows commands  Eyes: PERRLA/ EOMI  ENT/Neck: Neck supple, trachea midline, No JVD  Respiratory: B/L rales and rhonchi, No wheezing,   CV: Normal rate regular rhythm, S1S2, no murmurs, rubs or gallops  Abdominal: Soft, NT, ND +BS,   Extremities: No edema, + peripheral pulses  Skin: No Rashes, Hematoma, Ecchymosis  Incisions:   Tubes:    A/p  Suspected HFpEF 2/2 severe mitral valve dysfunction   -admit to tele   -IV Lasix   -Is and Os and daily weights   -Fluid restrictions   -Cardiology eval   -Pulm eval - patient request     Dyspnea - recently has an OP Cardiology w/u, EF preserved.  Valvular heart disease     PATIENT SEEN by ATTENDING 3/8/23 ( note revised 3/9) HPI:  *Icelandic speaker, interpretation by daughter at bedside*    83-year-old female with history of asthma, vertigo presents for SOB. At baseline, patient uses a walker for ambulation. As per patient no history of previous asthma exacerbation or intubations. No personal history of smoking, no exposure to second hand smoke.    Patient was in her usual state of health 3 days ago when she started feeling SOB (increases with exertion, relieved at rest), associated with cough w/yellow phlegm, headache (mild to moderate, increases with coughing, band like, new), chest pain (central location, feels like blowing up, occurs only with cough, not associated with exertion or rest, 7/10 at it's worst), lower extremity swelling. Denies Fever, chills, rigors, dizziness, NVD, abdominal pain, change in urination and change in bowel habits.     2 days ago patient was prescribed prednisone by her PCP which was initially helping with symptoms but today symptoms progressed and are at it's worst, for which she presented to ED. Negative flu/COVID test OP.    Patient endorsing that she had a heart scan done which showed leaky valve.    In the ED, CXR shows L>R pleural effusion and congestion, BNP 1085, Trops -ve, pCO2 37 on VBG. Clinically looks non-toxic on RA.  Vital Signs Last 24 Hrs:  T(F): 98.1 (08 Mar 2023 12:38), Max: 98.1 (08 Mar 2023 12:38)  HR: 77 (08 Mar 2023 12:38) (77 - 77)  BP: 167/67 (08 Mar 2023 12:38) (167/67 - 167/67)  RR: 20 (08 Mar 2023 12:38) (20 - 20)  SpO2: 98% (08 Mar 2023 12:38) (98% - 98%) on RA   (08 Mar 2023 21:25)    REVIEW OF SYSTEMS: see cc/HPI   CONSTITUTIONAL: No weakness, fevers or chills  EYES/ENT: No visual changes;  No vertigo or throat pain   NECK: No pain or stiffness  RESPIRATORY: (+) cough, wheezing, hemoptysis; (+) shortness of breath  CARDIOVASCULAR: No chest pain or palpitations  GASTROINTESTINAL: No abdominal or epigastric pain. No nausea, vomiting, or hematemesis; No diarrhea or constipation. No melena or hematochezia.  GENITOURINARY: No dysuria, frequency or hematuria  NEUROLOGICAL: No numbness or weakness  SKIN: No itching, rashes    Physical Exam:  General: WN/WD NAD  Neurology: A&Ox3, nonfocal, follows commands  Eyes: PERRLA/ EOMI  ENT/Neck: Neck supple, trachea midline, No JVD  Respiratory: B/L rales and rhonchi, No wheezing,   CV: Normal rate regular rhythm, S1S2, no murmurs, rubs or gallops  Abdominal: Soft, NT, ND +BS,   Extremities: No edema, + peripheral pulses  Skin: No Rashes, Hematoma, Ecchymosis  Incisions:   Tubes:    A/p  Suspected HFpEF 2/2 severe mitral valve dysfunction ( valvular calcifications and regurgitation)  -admit to tele   -IV Lasix   -Is and Os and daily weights   -Fluid restrictions   -Cardiology eval - seen recently for review of NST (Negative)  and Echo reports   -Pulm eval - patient request     Asthma - doubt exacerbation but wheezing earlier  -Taper IV steroids ---> pred   -c/w bronchodilators    Dyspnea - recently has an OP Cardiology w/u, EF preserved.  Valvular heart disease   -As above w/ Cardiology eval     PATIENT SEEN by ATTENDING 3/8/23 ( note revised 3/9)    Spoke to patient and daughter in Icelandic

## 2023-03-08 NOTE — ED PROVIDER NOTE - OBJECTIVE STATEMENT
83-year-old female with history of asthma, vertigo presents to the ED, accompanied by family complaining of shortness of breath, productive cough with yellow phlegm and chest pain with cough since Sunday.  Patient was seen on Monday by PMD and had negative COVID/flu testing and was started on prednisone.  Patient states has been using her Breo and albuterol inhaler without any improvement.  No fever, chills, leg pain, nausea, vomiting or weakness.

## 2023-03-09 LAB
24R-OH-CALCIDIOL SERPL-MCNC: 29 NG/ML — LOW (ref 30–80)
A1C WITH ESTIMATED AVERAGE GLUCOSE RESULT: 5.8 % — HIGH (ref 4–5.6)
ALBUMIN SERPL ELPH-MCNC: 4.1 G/DL — SIGNIFICANT CHANGE UP (ref 3.5–5.2)
ALP SERPL-CCNC: 67 U/L — SIGNIFICANT CHANGE UP (ref 30–115)
ALT FLD-CCNC: 16 U/L — SIGNIFICANT CHANGE UP (ref 0–41)
ANION GAP SERPL CALC-SCNC: 13 MMOL/L — SIGNIFICANT CHANGE UP (ref 7–14)
APTT BLD: 27.7 SEC — SIGNIFICANT CHANGE UP (ref 27–39.2)
AST SERPL-CCNC: 15 U/L — SIGNIFICANT CHANGE UP (ref 0–41)
BASOPHILS # BLD AUTO: 0.01 K/UL — SIGNIFICANT CHANGE UP (ref 0–0.2)
BASOPHILS NFR BLD AUTO: 0.2 % — SIGNIFICANT CHANGE UP (ref 0–1)
BILIRUB SERPL-MCNC: 0.3 MG/DL — SIGNIFICANT CHANGE UP (ref 0.2–1.2)
BUN SERPL-MCNC: 25 MG/DL — HIGH (ref 10–20)
CALCIUM SERPL-MCNC: 8.6 MG/DL — SIGNIFICANT CHANGE UP (ref 8.4–10.5)
CHLORIDE SERPL-SCNC: 103 MMOL/L — SIGNIFICANT CHANGE UP (ref 98–110)
CHOLEST SERPL-MCNC: 184 MG/DL — SIGNIFICANT CHANGE UP
CO2 SERPL-SCNC: 23 MMOL/L — SIGNIFICANT CHANGE UP (ref 17–32)
CREAT SERPL-MCNC: 0.7 MG/DL — SIGNIFICANT CHANGE UP (ref 0.7–1.5)
CRP SERPL-MCNC: 7.9 MG/L — HIGH
EGFR: 85 ML/MIN/1.73M2 — SIGNIFICANT CHANGE UP
EOSINOPHIL # BLD AUTO: 0 K/UL — SIGNIFICANT CHANGE UP (ref 0–0.7)
EOSINOPHIL NFR BLD AUTO: 0 % — SIGNIFICANT CHANGE UP (ref 0–8)
ERYTHROCYTE [SEDIMENTATION RATE] IN BLOOD: 19 MM/HR — SIGNIFICANT CHANGE UP (ref 0–20)
ESTIMATED AVERAGE GLUCOSE: 120 MG/DL — HIGH (ref 68–114)
GLUCOSE SERPL-MCNC: 131 MG/DL — HIGH (ref 70–99)
HCT VFR BLD CALC: 32.1 % — LOW (ref 37–47)
HDLC SERPL-MCNC: 64 MG/DL — SIGNIFICANT CHANGE UP
HGB BLD-MCNC: 10.6 G/DL — LOW (ref 12–16)
IMM GRANULOCYTES NFR BLD AUTO: 0.2 % — SIGNIFICANT CHANGE UP (ref 0.1–0.3)
INR BLD: 1.04 RATIO — SIGNIFICANT CHANGE UP (ref 0.65–1.3)
LIPID PNL WITH DIRECT LDL SERPL: 111 MG/DL — HIGH
LYMPHOCYTES # BLD AUTO: 0.98 K/UL — LOW (ref 1.2–3.4)
LYMPHOCYTES # BLD AUTO: 15.4 % — LOW (ref 20.5–51.1)
MCHC RBC-ENTMCNC: 30.7 PG — SIGNIFICANT CHANGE UP (ref 27–31)
MCHC RBC-ENTMCNC: 33 G/DL — SIGNIFICANT CHANGE UP (ref 32–37)
MCV RBC AUTO: 93 FL — SIGNIFICANT CHANGE UP (ref 81–99)
MONOCYTES # BLD AUTO: 0.5 K/UL — SIGNIFICANT CHANGE UP (ref 0.1–0.6)
MONOCYTES NFR BLD AUTO: 7.9 % — SIGNIFICANT CHANGE UP (ref 1.7–9.3)
NEUTROPHILS # BLD AUTO: 4.85 K/UL — SIGNIFICANT CHANGE UP (ref 1.4–6.5)
NEUTROPHILS NFR BLD AUTO: 76.3 % — HIGH (ref 42.2–75.2)
NON HDL CHOLESTEROL: 120 MG/DL — SIGNIFICANT CHANGE UP
NRBC # BLD: 0 /100 WBCS — SIGNIFICANT CHANGE UP (ref 0–0)
PLATELET # BLD AUTO: 185 K/UL — SIGNIFICANT CHANGE UP (ref 130–400)
POTASSIUM SERPL-MCNC: 4.4 MMOL/L — SIGNIFICANT CHANGE UP (ref 3.5–5)
POTASSIUM SERPL-SCNC: 4.4 MMOL/L — SIGNIFICANT CHANGE UP (ref 3.5–5)
PROT SERPL-MCNC: 6.8 G/DL — SIGNIFICANT CHANGE UP (ref 6–8)
PROTHROM AB SERPL-ACNC: 11.9 SEC — SIGNIFICANT CHANGE UP (ref 9.95–12.87)
RBC # BLD: 3.45 M/UL — LOW (ref 4.2–5.4)
RBC # FLD: 13.7 % — SIGNIFICANT CHANGE UP (ref 11.5–14.5)
SODIUM SERPL-SCNC: 139 MMOL/L — SIGNIFICANT CHANGE UP (ref 135–146)
TRIGL SERPL-MCNC: 48 MG/DL — SIGNIFICANT CHANGE UP
TROPONIN T SERPL-MCNC: <0.01 NG/ML — SIGNIFICANT CHANGE UP
WBC # BLD: 6.35 K/UL — SIGNIFICANT CHANGE UP (ref 4.8–10.8)
WBC # FLD AUTO: 6.35 K/UL — SIGNIFICANT CHANGE UP (ref 4.8–10.8)

## 2023-03-09 PROCEDURE — 71045 X-RAY EXAM CHEST 1 VIEW: CPT | Mod: 26

## 2023-03-09 PROCEDURE — 99233 SBSQ HOSP IP/OBS HIGH 50: CPT

## 2023-03-09 PROCEDURE — 93306 TTE W/DOPPLER COMPLETE: CPT | Mod: 26

## 2023-03-09 PROCEDURE — 93010 ELECTROCARDIOGRAM REPORT: CPT

## 2023-03-09 RX ORDER — ACETAMINOPHEN 500 MG
650 TABLET ORAL ONCE
Refills: 0 | Status: COMPLETED | OUTPATIENT
Start: 2023-03-09 | End: 2023-03-09

## 2023-03-09 RX ORDER — POLYETHYLENE GLYCOL 3350 17 G/17G
17 POWDER, FOR SOLUTION ORAL DAILY
Refills: 0 | Status: DISCONTINUED | OUTPATIENT
Start: 2023-03-09 | End: 2023-03-15

## 2023-03-09 RX ADMIN — ENOXAPARIN SODIUM 40 MILLIGRAM(S): 100 INJECTION SUBCUTANEOUS at 18:18

## 2023-03-09 RX ADMIN — Medication 3 MILLILITER(S): at 17:13

## 2023-03-09 RX ADMIN — POLYETHYLENE GLYCOL 3350 17 GRAM(S): 17 POWDER, FOR SOLUTION ORAL at 15:24

## 2023-03-09 RX ADMIN — Medication 650 MILLIGRAM(S): at 02:10

## 2023-03-09 RX ADMIN — Medication 40 MILLIGRAM(S): at 07:11

## 2023-03-09 RX ADMIN — Medication 40 MILLIGRAM(S): at 18:17

## 2023-03-09 RX ADMIN — SENNA PLUS 1 TABLET(S): 8.6 TABLET ORAL at 21:25

## 2023-03-09 RX ADMIN — PANTOPRAZOLE SODIUM 40 MILLIGRAM(S): 20 TABLET, DELAYED RELEASE ORAL at 07:11

## 2023-03-09 RX ADMIN — Medication 60 MILLIGRAM(S): at 07:11

## 2023-03-09 RX ADMIN — Medication 3 MILLILITER(S): at 13:34

## 2023-03-09 RX ADMIN — Medication 3 MILLILITER(S): at 22:36
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
## 2023-03-09 NOTE — PHYSICAL THERAPY INITIAL EVALUATION ADULT - GENERAL OBSERVATIONS, REHAB EVAL
pt seen from 13:50-14:20. pt encountered in stretcher, tele, agreeable to b/s PT, son present in the room, interpreted Slovenian during the session to assist PT with IE.

## 2023-03-09 NOTE — PHYSICAL THERAPY INITIAL EVALUATION ADULT - PERTINENT HX OF CURRENT PROBLEM, REHAB EVAL
83-year-old female with history of asthma, vertigo presents for SOB. At baseline, patient uses a walker for ambulation. As per patient no history of previous asthma exacerbation or intubations. No personal history of smoking, no exposure to second hand smoke.    Patient was in her usual state of health 3 days ago when she started feeling SOB (increases with exertion, relieved at rest), associated with cough w/yellow phlegm, headache (mild to moderate, increases with coughing, band like, new), chest pain (central location, feels like blowing up, occurs only with cough, not associated with exertion or rest, 7/10 at it's worst), lower extremity swelling. Denies Fever, chills, rigors, dizziness, NVD, abdominal pain, change in urination and change in bowel habits.   2 days ago patient was prescribed prednisone by her PCP which was initially helping with symptoms but today symptoms progressed and are at it's worst, for which she presented to ED. Negative flu/COVID test OP.  Patient endorsing that she had a heart scan done which showed leaky valve.

## 2023-03-09 NOTE — PHYSICAL THERAPY INITIAL EVALUATION ADULT - ADDITIONAL COMMENTS
Son  reported that she lives alone in a house, no steps to enter, and 1 flight inside and has a stair lift. Home health aid 8 hours/day 7 days/week.

## 2023-03-09 NOTE — PROGRESS NOTE ADULT - SUBJECTIVE AND OBJECTIVE BOX
VIANCA NEVAREZ  83y  FemaleS[facility]ly [facility]      Patient is a 83y old  Female who presents with a chief complaint of     INTERVAL HPI/OVERNIGHT EVENTS:        REVIEW OF SYSTEMS:        FAMILY HISTORY:    T(C): 36.2 (03-09-23 @ 08:01), Max: 36.7 (03-08-23 @ 12:38)  HR: 70 (03-09-23 @ 08:01) (70 - 86)  BP: 130/64 (03-09-23 @ 08:01) (130/64 - 167/67)  RR: 20 (03-09-23 @ 08:01) (16 - 20)  SpO2: 97% (03-09-23 @ 08:01) (97% - 99%)  Wt(kg): --Vital Signs Last 24 Hrs  T(C): 36.2 (09 Mar 2023 08:01), Max: 36.7 (08 Mar 2023 12:38)  T(F): 97.1 (09 Mar 2023 08:01), Max: 98.1 (08 Mar 2023 12:38)  HR: 70 (09 Mar 2023 08:01) (70 - 86)  BP: 130/64 (09 Mar 2023 08:01) (130/64 - 167/67)  BP(mean): --  RR: 20 (09 Mar 2023 08:01) (16 - 20)  SpO2: 97% (09 Mar 2023 08:01) (97% - 99%)    Parameters below as of 09 Mar 2023 08:01  Patient On (Oxygen Delivery Method): room air        PHYSICAL EXAM:  GENERAL: NAD, well-groomed, well-developed  HEAD:  Atraumatic, Normocephalic  EYES: EOMI, PERRLA, conjunctiva and sclera clear  ENMT: No tonsillar erythema, exudates, or enlargement; Moist mucous membranes, Good dentition, No lesions  NECK: Supple, No JVD, Normal thyroid  NERVOUS SYSTEM:  Alert & Oriented X3, Good concentration; Motor Strength 5/5 B/L upper and lower extremities; DTRs 2+ intact and symmetric  PULM: Clear to auscultation bilaterally  CARDIAC: Regular rate and rhythm; No murmurs, rubs, or gallops  GI: Soft, Nontender, Nondistended; Bowel sounds present  EXTREMITIES:  2+ Peripheral Pulses, No clubbing, cyanosis, or edema  LYMPH: No lymphadenopathy noted  SKIN: No rashes or lesions    Consultant(s) Notes Reviewed:  [x ] YES  [ ] NO  Care Discussed with Consultants/Other Providers [ x] YES  [ ] NO    LABS:                            10.6   6.35  )-----------( 185      ( 09 Mar 2023 05:28 )             32.1   03-09    139  |  103  |  25<H>  ----------------------------<  131<H>  4.4   |  23  |  0.7    Ca    8.6      09 Mar 2023 05:28    TPro  6.8  /  Alb  4.1  /  TBili  0.3  /  DBili  x   /  AST  15  /  ALT  16  /  AlkPhos  67  03-09            albuterol    90 MICROgram(s) HFA Inhaler 2 Puff(s) Inhalation every 6 hours PRN  albuterol/ipratropium for Nebulization 3 milliLiter(s) Nebulizer every 6 hours  enoxaparin Injectable 40 milliGRAM(s) SubCutaneous every 24 hours  furosemide   Injectable 40 milliGRAM(s) IV Push every 12 hours  meclizine 25 milliGRAM(s) Oral daily PRN  methylPREDNISolone sodium succinate Injectable 60 milliGRAM(s) IV Push every 12 hours  pantoprazole    Tablet 40 milliGRAM(s) Oral before breakfast  senna 2 Tablet(s) Oral at bedtime      83-year-old female with history of asthma, vertigo presents for SOB. At baseline, patient uses a walker for ambulation. As per patient no history of previous asthma exacerbation or intubations. No personal history of smoking, no exposure to second hand smoke.    1. SOB likely multifactorial due to acute congestive heart failure in the setting of mitral regurgitation and Suspected COPD/Asthma exacerbation. H/o chronic controlled Asthma as per patient  * No exposure to smoke, VBG shows normal Co2, but wheezing (cardiac origin ?)  - Telemetry         -ECG: Tall T wave in lead 1 and 2          - CXR: CHF, BNP elevated    - Cont lasix 40mg IV bid   - Switch to solumedrol daily   - Cyclic cardiac enzymes negative   * Stress test from this year -ve as per daughter  - Echocardio:  * TTE OP from 2/2023 shows severe MR- follow with Dr. Hatfield  - Cont inhalers  - Pulm consult:Pending   - Cardio consult:pending         #Misc  -DVT prophylaxis: Lovenox SQ  -GI prophylaxis: Protonix  -Diet: Regular  -Code status: Full code  -Activity: IAT  -Bowel regimen: Senna  -Dispo: Tele            Case Discussed with House Staff      [facility]AN[facility]AREZ  83y  [facility]      Patient is a 83y old  Female who presents with a chief complaint of     INTERVAL HPI/OVERNIGHT EVENTS:  Patient reported that her breathing improve by 30%   Daughter and son at the bedside who was updated about the plan of care  she has nodular rash in the lower extremity that she reports is tender and itchy.             FAMILY HISTORY:    T(C): 36.2 (03-09-23 @ 08:01), Max: 36.7 (03-08-23 @ 12:38)  HR: 70 (03-09-23 @ 08:01) (70 - 86)  BP: 130/64 (03-09-23 @ 08:01) (130/64 - 167/67)  RR: 20 (03-09-23 @ 08:01) (16 - 20)  SpO2: 97% (03-09-23 @ 08:01) (97% - 99%)  Wt(kg): --Vital Signs Last 24 Hrs  T(C): 36.2 (09 Mar 2023 08:01), Max: 36.7 (08 Mar 2023 12:38)  T(F): 97.1 (09 Mar 2023 08:01), Max: 98.1 (08 Mar 2023 12:38)  HR: 70 (09 Mar 2023 08:01) (70 - 86)  BP: 130/64 (09 Mar 2023 08:01) (130/64 - 167/67)  BP(mean): --  RR: 20 (09 Mar 2023 08:01) (16 - 20)  SpO2: 97% (09 Mar 2023 08:01) (97% - 99%)    Parameters below as of 09 Mar 2023 08:01  Patient On (Oxygen Delivery Method): room air        PHYSICAL EXAM:  GENERAL: NAD, well-groomed, well-developed  NERVOUS SYSTEM:  Alert & Oriented X3, Good concentration;  PULM: Lower lobe crackles   CARDIAC: Regular rate and rhythm; murmur   GI: Soft, Nontender, Nondistended; Bowel sounds present  EXTREMITIES:  2+ Peripheral Pulse, nodular rash, non-blanchable, tender       Consultant(s) Notes Reviewed:  [x ] YES  [ ] NO  Care Discussed with Consultants/Other Providers [ x] YES  [ ] NO    LABS:                            10.6   6.35  )-----------( 185      ( 09 Mar 2023 05:28 )             32.1   03-09    139  |  103  |  25<H>  ----------------------------<  131<H>  4.4   |  23  |  0.7    Ca    8.6      09 Mar 2023 05:28    TPro  6.8  /  Alb  4.1  /  TBili  0.3  /  DBili  x   /  AST  15  /  ALT  16  /  AlkPhos  67  03-09            albuterol    90 MICROgram(s) HFA Inhaler 2 Puff(s) Inhalation every 6 hours PRN  albuterol/ipratropium for Nebulization 3 milliLiter(s) Nebulizer every 6 hours  enoxaparin Injectable 40 milliGRAM(s) SubCutaneous every 24 hours  furosemide   Injectable 40 milliGRAM(s) IV Push every 12 hours  meclizine 25 milliGRAM(s) Oral daily PRN  methylPREDNISolone sodium succinate Injectable 60 milliGRAM(s) IV Push every 12 hours  pantoprazole    Tablet 40 milliGRAM(s) Oral before breakfast  senna 2 Tablet(s) Oral at bedtime      83-year-old female with history of asthma, vertigo presents for SOB. At baseline, patient uses a walker for ambulation. As per patient no history of previous asthma exacerbation or intubations. No personal history of smoking, no exposure to second hand smoke.    1. SOB likely multifactorial due to acute congestive heart failure in the setting of mitral regurgitation and Suspected COPD/Asthma exacerbation. H/o chronic controlled Asthma as per patient  * No exposure to smoke, VBG shows normal Co2, but wheezing (cardiac origin ?)  - Telemetry         -ECG: Tall T wave in lead 1 and 2          - CXR: CHF, BNP elevated    - Cont lasix 40mg IV bid   - Switch to solumedrol daily   - Cyclic cardiac enzymes negative   * Stress test from this year -ve as per daughter  - Echocardio:EF:70%, severe MR   * TTE OP from 2/2023 shows severe MR- follow with Dr. Hatfield  - Cont inhalers  - Pulm consult:Pending   - Cardio consult:pending     2. Lower extremity rash nodular non-blanchable. erythema nodosum?   - INR:1       - pt: nl   - No clear etiology. check inflammatory markers       #Misc  -DVT prophylaxis: Lovenox SQ  -GI prophylaxis: Protonix  -Diet: Regular  -Code status: Full code  -Activity: IAT  -Bowel regimen: Senna  -Dispo: Tele            Case Discussed with House Staff

## 2023-03-10 LAB
ALBUMIN SERPL ELPH-MCNC: 4.5 G/DL — SIGNIFICANT CHANGE UP (ref 3.5–5.2)
ALP SERPL-CCNC: 71 U/L — SIGNIFICANT CHANGE UP (ref 30–115)
ALT FLD-CCNC: 17 U/L — SIGNIFICANT CHANGE UP (ref 0–41)
ANION GAP SERPL CALC-SCNC: 12 MMOL/L — SIGNIFICANT CHANGE UP (ref 7–14)
AST SERPL-CCNC: 16 U/L — SIGNIFICANT CHANGE UP (ref 0–41)
BASOPHILS # BLD AUTO: 0.02 K/UL — SIGNIFICANT CHANGE UP (ref 0–0.2)
BASOPHILS NFR BLD AUTO: 0.2 % — SIGNIFICANT CHANGE UP (ref 0–1)
BILIRUB SERPL-MCNC: 0.4 MG/DL — SIGNIFICANT CHANGE UP (ref 0.2–1.2)
BUN SERPL-MCNC: 34 MG/DL — HIGH (ref 10–20)
CALCIUM SERPL-MCNC: 9.1 MG/DL — SIGNIFICANT CHANGE UP (ref 8.4–10.5)
CHLORIDE SERPL-SCNC: 100 MMOL/L — SIGNIFICANT CHANGE UP (ref 98–110)
CO2 SERPL-SCNC: 26 MMOL/L — SIGNIFICANT CHANGE UP (ref 17–32)
CREAT SERPL-MCNC: 0.9 MG/DL — SIGNIFICANT CHANGE UP (ref 0.7–1.5)
EGFR: 63 ML/MIN/1.73M2 — SIGNIFICANT CHANGE UP
EOSINOPHIL # BLD AUTO: 0.05 K/UL — SIGNIFICANT CHANGE UP (ref 0–0.7)
EOSINOPHIL NFR BLD AUTO: 0.5 % — SIGNIFICANT CHANGE UP (ref 0–8)
GLUCOSE SERPL-MCNC: 88 MG/DL — SIGNIFICANT CHANGE UP (ref 70–99)
HCT VFR BLD CALC: 36.8 % — LOW (ref 37–47)
HGB BLD-MCNC: 12.2 G/DL — SIGNIFICANT CHANGE UP (ref 12–16)
IMM GRANULOCYTES NFR BLD AUTO: 0.2 % — SIGNIFICANT CHANGE UP (ref 0.1–0.3)
LYMPHOCYTES # BLD AUTO: 3.39 K/UL — SIGNIFICANT CHANGE UP (ref 1.2–3.4)
LYMPHOCYTES # BLD AUTO: 36.6 % — SIGNIFICANT CHANGE UP (ref 20.5–51.1)
MAGNESIUM SERPL-MCNC: 2.6 MG/DL — HIGH (ref 1.8–2.4)
MCHC RBC-ENTMCNC: 30.7 PG — SIGNIFICANT CHANGE UP (ref 27–31)
MCHC RBC-ENTMCNC: 33.2 G/DL — SIGNIFICANT CHANGE UP (ref 32–37)
MCV RBC AUTO: 92.7 FL — SIGNIFICANT CHANGE UP (ref 81–99)
MONOCYTES # BLD AUTO: 1.01 K/UL — HIGH (ref 0.1–0.6)
MONOCYTES NFR BLD AUTO: 10.9 % — HIGH (ref 1.7–9.3)
NEUTROPHILS # BLD AUTO: 4.77 K/UL — SIGNIFICANT CHANGE UP (ref 1.4–6.5)
NEUTROPHILS NFR BLD AUTO: 51.6 % — SIGNIFICANT CHANGE UP (ref 42.2–75.2)
NRBC # BLD: 0 /100 WBCS — SIGNIFICANT CHANGE UP (ref 0–0)
PLATELET # BLD AUTO: 218 K/UL — SIGNIFICANT CHANGE UP (ref 130–400)
POTASSIUM SERPL-MCNC: 3.9 MMOL/L — SIGNIFICANT CHANGE UP (ref 3.5–5)
POTASSIUM SERPL-SCNC: 3.9 MMOL/L — SIGNIFICANT CHANGE UP (ref 3.5–5)
PROT SERPL-MCNC: 7.5 G/DL — SIGNIFICANT CHANGE UP (ref 6–8)
RBC # BLD: 3.97 M/UL — LOW (ref 4.2–5.4)
RBC # FLD: 13.5 % — SIGNIFICANT CHANGE UP (ref 11.5–14.5)
SODIUM SERPL-SCNC: 138 MMOL/L — SIGNIFICANT CHANGE UP (ref 135–146)
VIT D25+D1,25 OH+D1,25 PNL SERPL-MCNC: 76.8 PG/ML — SIGNIFICANT CHANGE UP (ref 19.9–79.3)
WBC # BLD: 9.26 K/UL — SIGNIFICANT CHANGE UP (ref 4.8–10.8)
WBC # FLD AUTO: 9.26 K/UL — SIGNIFICANT CHANGE UP (ref 4.8–10.8)

## 2023-03-10 PROCEDURE — 99233 SBSQ HOSP IP/OBS HIGH 50: CPT

## 2023-03-10 RX ORDER — LACTULOSE 10 G/15ML
10 SOLUTION ORAL
Refills: 0 | Status: COMPLETED | OUTPATIENT
Start: 2023-03-10 | End: 2023-03-10

## 2023-03-10 RX ADMIN — Medication 40 MILLIGRAM(S): at 06:54

## 2023-03-10 RX ADMIN — Medication 40 MILLIGRAM(S): at 18:54

## 2023-03-10 RX ADMIN — LACTULOSE 10 GRAM(S): 10 SOLUTION ORAL at 16:10

## 2023-03-10 RX ADMIN — PANTOPRAZOLE SODIUM 40 MILLIGRAM(S): 20 TABLET, DELAYED RELEASE ORAL at 06:55

## 2023-03-10 RX ADMIN — ENOXAPARIN SODIUM 40 MILLIGRAM(S): 100 INJECTION SUBCUTANEOUS at 18:54

## 2023-03-10 RX ADMIN — LACTULOSE 10 GRAM(S): 10 SOLUTION ORAL at 18:55

## 2023-03-10 RX ADMIN — Medication 60 MILLIGRAM(S): at 06:55

## 2023-03-10 RX ADMIN — Medication 3 MILLILITER(S): at 04:08

## 2023-03-10 RX ADMIN — POLYETHYLENE GLYCOL 3350 17 GRAM(S): 17 POWDER, FOR SOLUTION ORAL at 10:54

## 2023-03-10 RX ADMIN — SENNA PLUS 2 TABLET(S): 8.6 TABLET ORAL at 21:30

## 2023-03-10 NOTE — PROGRESS NOTE ADULT - SUBJECTIVE AND OBJECTIVE BOX
Patient is a 83y old  Female who presents with a chief complaint of Severe SOB (09 Mar 2023 13:00)    HPI:  *Stateless speaker, interpretation by daughter at bedside*    83-year-old female with history of asthma, vertigo presents for SOB. At baseline, patient uses a walker for ambulation. As per patient no history of previous asthma exacerbation or intubations. No personal history of smoking, no exposure to second hand smoke.    Patient was in her usual state of health 3 days ago when she started feeling SOB (increases with exertion, relieved at rest), associated with cough w/yellow phlegm, headache (mild to moderate, increases with coughing, band like, new), chest pain (central location, feels like blowing up, occurs only with cough, not associated with exertion or rest, 7/10 at it's worst), lower extremity swelling. Denies Fever, chills, rigors, dizziness, NVD, abdominal pain, change in urination and change in bowel habits.     2 days ago patient was prescribed prednisone by her PCP which was initially helping with symptoms but today symptoms progressed and are at it's worst, for which she presented to ED. Negative flu/COVID test OP.    Patient endorsing that she had a heart scan done which showed leaky valve.   (08 Mar 2023 21:25)    patient seen and examined independently on morning rounds for the first time today in ED4, chart reviewed and discussed with the medicine resident and on interdisciplinary rounds.    no overnight events--son is bedside and d/w daugther who is HCP At length on phone bedside (including that family wants to f/u with new cardiologist (Dr. Person))  no bm x 4 days  -    Vital Signs Last 24 Hrs  T(C): 36.6 (10 Mar 2023 14:42), Max: 36.8 (10 Mar 2023 08:05)  T(F): 97.9 (10 Mar 2023 14:42), Max: 98.2 (10 Mar 2023 08:05)  HR: 75 (10 Mar 2023 14:42) (71 - 89)  BP: 151/67 (10 Mar 2023 14:42) (130/54 - 151/67)  BP(mean): 101 (10 Mar 2023 08:05) (93 - 101)  RR: 18 (10 Mar 2023 14:42) (18 - 20)  SpO2: 96% (10 Mar 2023 14:42) (96% - 97%)    Parameters below as of 10 Mar 2023 14:42  Patient On (Oxygen Delivery Method): room air    PE:  GEN-NAD, AAOx3, Wallisian speaking- appears comfortable  PULM- fair air entry with decreased bs bilateral bases   CVS- +s1/s2 RRR   GI- soft NT ND +bs  EXT- trace edema                        12.2   9.26  )-----------( 218      ( 10 Mar 2023 06:52 )             36.8     03-10    138  |  100  |  34<H>  ----------------------------<  88  3.9   |  26  |  0.9    Ca    9.1      10 Mar 2023 06:52  Mg     2.6     03-10    TPro  7.5  /  Alb  4.5  /  TBili  0.4  /  DBili  x   /  AST  16  /  ALT  17  /  AlkPhos  71  03-10    CARDIAC MARKERS ( 09 Mar 2023 05:28 )  x     / <0.01 ng/mL / x     / x     / x                MEDICATIONS  (STANDING):  albuterol/ipratropium for Nebulization 3 milliLiter(s) Nebulizer every 6 hours  enoxaparin Injectable 40 milliGRAM(s) SubCutaneous every 24 hours  furosemide   Injectable 40 milliGRAM(s) IV Push every 12 hours  methylPREDNISolone sodium succinate Injectable 60 milliGRAM(s) IV Push daily  pantoprazole    Tablet 40 milliGRAM(s) Oral before breakfast  polyethylene glycol 3350 17 Gram(s) Oral daily  senna 2 Tablet(s) Oral at bedtime

## 2023-03-11 LAB
ALBUMIN SERPL ELPH-MCNC: 4.2 G/DL — SIGNIFICANT CHANGE UP (ref 3.5–5.2)
ALP SERPL-CCNC: 64 U/L — SIGNIFICANT CHANGE UP (ref 30–115)
ALT FLD-CCNC: 15 U/L — SIGNIFICANT CHANGE UP (ref 0–41)
ANION GAP SERPL CALC-SCNC: 12 MMOL/L — SIGNIFICANT CHANGE UP (ref 7–14)
AST SERPL-CCNC: 14 U/L — SIGNIFICANT CHANGE UP (ref 0–41)
BASOPHILS # BLD AUTO: 0.01 K/UL — SIGNIFICANT CHANGE UP (ref 0–0.2)
BASOPHILS NFR BLD AUTO: 0.1 % — SIGNIFICANT CHANGE UP (ref 0–1)
BILIRUB SERPL-MCNC: 0.3 MG/DL — SIGNIFICANT CHANGE UP (ref 0.2–1.2)
BUN SERPL-MCNC: 41 MG/DL — HIGH (ref 10–20)
CALCIUM SERPL-MCNC: 8.6 MG/DL — SIGNIFICANT CHANGE UP (ref 8.4–10.5)
CHLORIDE SERPL-SCNC: 98 MMOL/L — SIGNIFICANT CHANGE UP (ref 98–110)
CO2 SERPL-SCNC: 24 MMOL/L — SIGNIFICANT CHANGE UP (ref 17–32)
CREAT SERPL-MCNC: 0.9 MG/DL — SIGNIFICANT CHANGE UP (ref 0.7–1.5)
EGFR: 63 ML/MIN/1.73M2 — SIGNIFICANT CHANGE UP
EOSINOPHIL # BLD AUTO: 0.07 K/UL — SIGNIFICANT CHANGE UP (ref 0–0.7)
EOSINOPHIL NFR BLD AUTO: 0.8 % — SIGNIFICANT CHANGE UP (ref 0–8)
GLUCOSE SERPL-MCNC: 92 MG/DL — SIGNIFICANT CHANGE UP (ref 70–99)
HCT VFR BLD CALC: 35.7 % — LOW (ref 37–47)
HGB BLD-MCNC: 11.9 G/DL — LOW (ref 12–16)
IMM GRANULOCYTES NFR BLD AUTO: 0.2 % — SIGNIFICANT CHANGE UP (ref 0.1–0.3)
LYMPHOCYTES # BLD AUTO: 3.05 K/UL — SIGNIFICANT CHANGE UP (ref 1.2–3.4)
LYMPHOCYTES # BLD AUTO: 36.4 % — SIGNIFICANT CHANGE UP (ref 20.5–51.1)
MAGNESIUM SERPL-MCNC: 2.3 MG/DL — SIGNIFICANT CHANGE UP (ref 1.8–2.4)
MCHC RBC-ENTMCNC: 30.7 PG — SIGNIFICANT CHANGE UP (ref 27–31)
MCHC RBC-ENTMCNC: 33.3 G/DL — SIGNIFICANT CHANGE UP (ref 32–37)
MCV RBC AUTO: 92 FL — SIGNIFICANT CHANGE UP (ref 81–99)
MONOCYTES # BLD AUTO: 0.96 K/UL — HIGH (ref 0.1–0.6)
MONOCYTES NFR BLD AUTO: 11.5 % — HIGH (ref 1.7–9.3)
NEUTROPHILS # BLD AUTO: 4.26 K/UL — SIGNIFICANT CHANGE UP (ref 1.4–6.5)
NEUTROPHILS NFR BLD AUTO: 51 % — SIGNIFICANT CHANGE UP (ref 42.2–75.2)
NRBC # BLD: 0 /100 WBCS — SIGNIFICANT CHANGE UP (ref 0–0)
PLATELET # BLD AUTO: 192 K/UL — SIGNIFICANT CHANGE UP (ref 130–400)
POTASSIUM SERPL-MCNC: 4 MMOL/L — SIGNIFICANT CHANGE UP (ref 3.5–5)
POTASSIUM SERPL-SCNC: 4 MMOL/L — SIGNIFICANT CHANGE UP (ref 3.5–5)
PROT SERPL-MCNC: 6.9 G/DL — SIGNIFICANT CHANGE UP (ref 6–8)
RBC # BLD: 3.88 M/UL — LOW (ref 4.2–5.4)
RBC # FLD: 13.4 % — SIGNIFICANT CHANGE UP (ref 11.5–14.5)
SODIUM SERPL-SCNC: 134 MMOL/L — LOW (ref 135–146)
WBC # BLD: 8.37 K/UL — SIGNIFICANT CHANGE UP (ref 4.8–10.8)
WBC # FLD AUTO: 8.37 K/UL — SIGNIFICANT CHANGE UP (ref 4.8–10.8)

## 2023-03-11 PROCEDURE — 71045 X-RAY EXAM CHEST 1 VIEW: CPT | Mod: 26

## 2023-03-11 PROCEDURE — 99233 SBSQ HOSP IP/OBS HIGH 50: CPT

## 2023-03-11 RX ORDER — FLUTICASONE FUROATE AND VILANTEROL TRIFENATATE 100; 25 UG/1; UG/1
1 POWDER RESPIRATORY (INHALATION) DAILY
Refills: 0 | Status: DISCONTINUED | OUTPATIENT
Start: 2023-03-11 | End: 2023-03-15

## 2023-03-11 RX ORDER — FUROSEMIDE 40 MG
40 TABLET ORAL DAILY
Refills: 0 | Status: DISCONTINUED | OUTPATIENT
Start: 2023-03-12 | End: 2023-03-15

## 2023-03-11 RX ADMIN — Medication 40 MILLIGRAM(S): at 06:33

## 2023-03-11 RX ADMIN — Medication 60 MILLIGRAM(S): at 06:33

## 2023-03-11 RX ADMIN — PANTOPRAZOLE SODIUM 40 MILLIGRAM(S): 20 TABLET, DELAYED RELEASE ORAL at 06:34

## 2023-03-11 RX ADMIN — Medication 3 MILLILITER(S): at 08:52

## 2023-03-11 RX ADMIN — SENNA PLUS 2 TABLET(S): 8.6 TABLET ORAL at 22:17

## 2023-03-11 RX ADMIN — ENOXAPARIN SODIUM 40 MILLIGRAM(S): 100 INJECTION SUBCUTANEOUS at 17:25

## 2023-03-11 RX ADMIN — POLYETHYLENE GLYCOL 3350 17 GRAM(S): 17 POWDER, FOR SOLUTION ORAL at 12:10

## 2023-03-11 RX ADMIN — FLUTICASONE FUROATE AND VILANTEROL TRIFENATATE 1 PUFF(S): 100; 25 POWDER RESPIRATORY (INHALATION) at 22:17

## 2023-03-11 NOTE — PATIENT PROFILE ADULT - FALL HARM RISK - HARM RISK INTERVENTIONS

## 2023-03-11 NOTE — PROGRESS NOTE ADULT - SUBJECTIVE AND OBJECTIVE BOX
Patient is a 83y old  Female who presents with SOB (09 Mar 2023 13:00)    OVERNIGHT EVENTS:  patient seen and examined this morning  sitting in the chair  in nad  denies sob or chest pain  not requiring supplemental oxygen       Vital Signs Last 24 Hrs  T(C): 36.1 (11 Mar 2023 14:24), Max: 36.6 (11 Mar 2023 04:09)  T(F): 96.9 (11 Mar 2023 14:24), Max: 97.8 (11 Mar 2023 04:09)  HR: 72 (11 Mar 2023 14:24) (64 - 84)  BP: 142/64 (11 Mar 2023 14:24) (113/70 - 142/64)  BP(mean): --  RR: 18 (11 Mar 2023 14:24) (18 - 18)  SpO2: 95% (11 Mar 2023 10:09) (95% - 96%)    Parameters below as of 11 Mar 2023 10:09  Patient On (Oxygen Delivery Method): room air      PHYSICAL EXAM:  GENERAL: NAD, well-groomed, well-developed  HEAD:  Atraumatic, Normocephalic  EYES: EOMI, PERRLA, conjunctiva and sclera clear  NERVOUS SYSTEM:  awake and alert  CHEST/LUNG: decreased bs at bases   HEART: Regular rate and rhythm; No murmurs, rubs, or gallops  ABDOMEN: Soft, Nontender, Nondistended; Bowel sounds present  EXTREMITIES:  2+ Peripheral Pulses, No clubbing, cyanosis, or edema  SKIN: No rashes or lesions                LABS:                       11.9   8.37  )-----------( 192      ( 11 Mar 2023 04:58 )             35.7       03-11    134<L>  |  98  |  41<H>  ----------------------------<  92  4.0   |  24  |  0.9    Ca    8.6      11 Mar 2023 04:58  Mg     2.3     03-11    TPro  6.9  /  Alb  4.2  /  TBili  0.3  /  DBili  x   /  AST  14  /  ALT  15  /  AlkPhos  64  03-11        MEDICATIONS  (STANDING):  albuterol/ipratropium for Nebulization 3 milliLiter(s) Nebulizer every 6 hours  enoxaparin Injectable 40 milliGRAM(s) SubCutaneous every 24 hours  fluticasone furoate/vilanterol 100-25 MICROgram(s) Inhaler 1 Puff(s) Inhalation daily  pantoprazole    Tablet 40 milliGRAM(s) Oral before breakfast  polyethylene glycol 3350 17 Gram(s) Oral daily  senna 2 Tablet(s) Oral at bedtime    MEDICATIONS  (PRN):  albuterol    90 MICROgram(s) HFA Inhaler 2 Puff(s) Inhalation every 6 hours PRN Shortness of Breath and/or Wheezing  meclizine 25 milliGRAM(s) Oral daily PRN Dizziness

## 2023-03-11 NOTE — CHART NOTE - NSCHARTNOTEFT_GEN_A_CORE
Spoke with patient's daughters at bedside. They were requesting cardiology evaluation by Dr. Tirado or his team now and would like a discharge plan from him. They also asked to resume home breo, nonformulary filled out and, along with medication, medication given to RN. Family also concerned over a rash, macular, red, on bilateral LE that used to cause pain, now improved. Patient saw a dermatologist outside and was told to see vascular for the rash.  - Follow up LE duplex

## 2023-03-12 LAB
ALBUMIN SERPL ELPH-MCNC: 4.1 G/DL — SIGNIFICANT CHANGE UP (ref 3.5–5.2)
ALP SERPL-CCNC: 74 U/L — SIGNIFICANT CHANGE UP (ref 30–115)
ALT FLD-CCNC: 14 U/L — SIGNIFICANT CHANGE UP (ref 0–41)
ANION GAP SERPL CALC-SCNC: 13 MMOL/L — SIGNIFICANT CHANGE UP (ref 7–14)
AST SERPL-CCNC: 14 U/L — SIGNIFICANT CHANGE UP (ref 0–41)
BASOPHILS # BLD AUTO: 0.02 K/UL — SIGNIFICANT CHANGE UP (ref 0–0.2)
BASOPHILS NFR BLD AUTO: 0.2 % — SIGNIFICANT CHANGE UP (ref 0–1)
BILIRUB SERPL-MCNC: 0.3 MG/DL — SIGNIFICANT CHANGE UP (ref 0.2–1.2)
BUN SERPL-MCNC: 40 MG/DL — HIGH (ref 10–20)
CALCIUM SERPL-MCNC: 8.7 MG/DL — SIGNIFICANT CHANGE UP (ref 8.4–10.5)
CHLORIDE SERPL-SCNC: 101 MMOL/L — SIGNIFICANT CHANGE UP (ref 98–110)
CO2 SERPL-SCNC: 23 MMOL/L — SIGNIFICANT CHANGE UP (ref 17–32)
CREAT SERPL-MCNC: 0.9 MG/DL — SIGNIFICANT CHANGE UP (ref 0.7–1.5)
EGFR: 63 ML/MIN/1.73M2 — SIGNIFICANT CHANGE UP
EOSINOPHIL # BLD AUTO: 0.18 K/UL — SIGNIFICANT CHANGE UP (ref 0–0.7)
EOSINOPHIL NFR BLD AUTO: 1.9 % — SIGNIFICANT CHANGE UP (ref 0–8)
GLUCOSE SERPL-MCNC: 91 MG/DL — SIGNIFICANT CHANGE UP (ref 70–99)
HCT VFR BLD CALC: 35.3 % — LOW (ref 37–47)
HGB BLD-MCNC: 12.1 G/DL — SIGNIFICANT CHANGE UP (ref 12–16)
IMM GRANULOCYTES NFR BLD AUTO: 0.4 % — HIGH (ref 0.1–0.3)
LYMPHOCYTES # BLD AUTO: 3.53 K/UL — HIGH (ref 1.2–3.4)
LYMPHOCYTES # BLD AUTO: 38 % — SIGNIFICANT CHANGE UP (ref 20.5–51.1)
MAGNESIUM SERPL-MCNC: 2.5 MG/DL — HIGH (ref 1.8–2.4)
MCHC RBC-ENTMCNC: 31.8 PG — HIGH (ref 27–31)
MCHC RBC-ENTMCNC: 34.3 G/DL — SIGNIFICANT CHANGE UP (ref 32–37)
MCV RBC AUTO: 92.7 FL — SIGNIFICANT CHANGE UP (ref 81–99)
MONOCYTES # BLD AUTO: 0.97 K/UL — HIGH (ref 0.1–0.6)
MONOCYTES NFR BLD AUTO: 10.4 % — HIGH (ref 1.7–9.3)
NEUTROPHILS # BLD AUTO: 4.56 K/UL — SIGNIFICANT CHANGE UP (ref 1.4–6.5)
NEUTROPHILS NFR BLD AUTO: 49.1 % — SIGNIFICANT CHANGE UP (ref 42.2–75.2)
NRBC # BLD: 0 /100 WBCS — SIGNIFICANT CHANGE UP (ref 0–0)
PLATELET # BLD AUTO: 210 K/UL — SIGNIFICANT CHANGE UP (ref 130–400)
POTASSIUM SERPL-MCNC: 4 MMOL/L — SIGNIFICANT CHANGE UP (ref 3.5–5)
POTASSIUM SERPL-SCNC: 4 MMOL/L — SIGNIFICANT CHANGE UP (ref 3.5–5)
PROT SERPL-MCNC: 6.8 G/DL — SIGNIFICANT CHANGE UP (ref 6–8)
RBC # BLD: 3.81 M/UL — LOW (ref 4.2–5.4)
RBC # FLD: 13.4 % — SIGNIFICANT CHANGE UP (ref 11.5–14.5)
SODIUM SERPL-SCNC: 137 MMOL/L — SIGNIFICANT CHANGE UP (ref 135–146)
WBC # BLD: 9.3 K/UL — SIGNIFICANT CHANGE UP (ref 4.8–10.8)
WBC # FLD AUTO: 9.3 K/UL — SIGNIFICANT CHANGE UP (ref 4.8–10.8)

## 2023-03-12 PROCEDURE — 99232 SBSQ HOSP IP/OBS MODERATE 35: CPT

## 2023-03-12 PROCEDURE — 93970 EXTREMITY STUDY: CPT | Mod: 26

## 2023-03-12 RX ADMIN — FLUTICASONE FUROATE AND VILANTEROL TRIFENATATE 1 PUFF(S): 100; 25 POWDER RESPIRATORY (INHALATION) at 08:16

## 2023-03-12 RX ADMIN — Medication 40 MILLIGRAM(S): at 05:12

## 2023-03-12 RX ADMIN — ENOXAPARIN SODIUM 40 MILLIGRAM(S): 100 INJECTION SUBCUTANEOUS at 17:08

## 2023-03-12 RX ADMIN — Medication 3 MILLILITER(S): at 19:40

## 2023-03-12 RX ADMIN — SENNA PLUS 2 TABLET(S): 8.6 TABLET ORAL at 21:36

## 2023-03-12 RX ADMIN — Medication 3 MILLILITER(S): at 08:15

## 2023-03-12 RX ADMIN — PANTOPRAZOLE SODIUM 40 MILLIGRAM(S): 20 TABLET, DELAYED RELEASE ORAL at 05:12

## 2023-03-12 RX ADMIN — Medication 3 MILLILITER(S): at 17:54

## 2023-03-12 NOTE — PROGRESS NOTE ADULT - SUBJECTIVE AND OBJECTIVE BOX
SUBJECTIVE:    Patient is a 83y old Female who presents with a chief complaint of sob (10 Mar 2023 15:38)    Currently admitted to medicine with the primary diagnosis of SOB (shortness of breath)       Today is hospital day 4d.     PAST MEDICAL & SURGICAL HISTORY  Asthma      ALLERGIES:  No Known Allergies    MEDICATIONS:  STANDING MEDICATIONS  albuterol/ipratropium for Nebulization 3 milliLiter(s) Nebulizer every 6 hours  enoxaparin Injectable 40 milliGRAM(s) SubCutaneous every 24 hours  fluticasone furoate/vilanterol 100-25 MICROgram(s) Inhaler 1 Puff(s) Inhalation daily  furosemide    Tablet 40 milliGRAM(s) Oral daily  pantoprazole    Tablet 40 milliGRAM(s) Oral before breakfast  polyethylene glycol 3350 17 Gram(s) Oral daily  predniSONE   Tablet 40 milliGRAM(s) Oral every 24 hours  senna 2 Tablet(s) Oral at bedtime    PRN MEDICATIONS  albuterol    90 MICROgram(s) HFA Inhaler 2 Puff(s) Inhalation every 6 hours PRN  meclizine 25 milliGRAM(s) Oral daily PRN    VITALS:   T(F): 96.5  HR: 82  BP: 145/63  RR: 18  SpO2: 96%    LABS:                        12.1   9.30  )-----------( 210      ( 12 Mar 2023 05:47 )             35.3     03-12    137  |  101  |  40<H>  ----------------------------<  91  4.0   |  23  |  0.9    Ca    8.7      12 Mar 2023 05:47  Mg     2.5     03-12    TPro  6.8  /  Alb  4.1  /  TBili  0.3  /  DBili  x   /  AST  14  /  ALT  14  /  AlkPhos  74  03-12                  RADIOLOGY:    PHYSICAL EXAM:  GEN: No acute distress  LUNGS: Clear to auscultation bilaterally   HEART: S1/S2 present. RRR.   ABD/ GI: Soft, non-tender, non-distended. Bowel sounds present  EXT: NC/NC/NE/2+PP/SOTO  NEURO: AAOX3

## 2023-03-12 NOTE — PROGRESS NOTE ADULT - SUBJECTIVE AND OBJECTIVE BOX
VIANCA NEVAREZ 83y Female  MRN#: [id number]   CODE STATUS: FULL      SUBJECTIVE  Patient is a 83y old Female who presents with a chief complaint of sob (10 Mar 2023 15:38)    Today is hospital day 4d,   INTERVAL HPI/OVERNIGHT EVENTS:    Examined pt at bedside this AM. There were no acute events overnight.    Present Today:           Mendez Catheter (x)No/ ()Yes?   Indication:             Central Line (x)No/ ()Yes?   Indication:          IV Fluids (x)No/ ()Yes? Type:  Rate:  Indication:    OBJECTIVE  PAST MEDICAL & SURGICAL HISTORY  Asthma      ALLERGIES:  No Known Allergies    HOME MEDICATIONS:  Home Medications:  Albuterol (Eqv-ProAir HFA) 90 mcg/inh inhalation aerosol: 2 puff(s) inhaled every 6 hours, As Needed (08 Mar 2023 21:28)  Breo Ellipta 100 mcg-25 mcg/inh inhalation powder: 1 puff(s) inhaled once a day (08 Mar 2023 21:28)  meclizine 25 mg oral tablet: 1 tab(s) orally 3 times a day, As Needed (08 Mar 2023 21:28)    MEDICATIONS:  STANDING MEDICATIONS  albuterol/ipratropium for Nebulization 3 milliLiter(s) Nebulizer every 6 hours  enoxaparin Injectable 40 milliGRAM(s) SubCutaneous every 24 hours  fluticasone furoate/vilanterol 100-25 MICROgram(s) Inhaler 1 Puff(s) Inhalation daily  furosemide    Tablet 40 milliGRAM(s) Oral daily  pantoprazole    Tablet 40 milliGRAM(s) Oral before breakfast  polyethylene glycol 3350 17 Gram(s) Oral daily  predniSONE   Tablet 40 milliGRAM(s) Oral every 24 hours  senna 2 Tablet(s) Oral at bedtime    PRN MEDICATIONS  albuterol    90 MICROgram(s) HFA Inhaler 2 Puff(s) Inhalation every 6 hours PRN  meclizine 25 milliGRAM(s) Oral daily PRN      VITAL SIGNS: Last 24 Hours  T(C): 36.1 (11 Mar 2023 20:10), Max: 36.6 (11 Mar 2023 04:09)  T(F): 97 (11 Mar 2023 20:10), Max: 97.8 (11 Mar 2023 04:09)  HR: 66 (11 Mar 2023 20:10) (66 - 84)  BP: 139/63 (11 Mar 2023 20:10) (113/70 - 142/64)  BP(mean): --  RR: 17 (11 Mar 2023 20:10) (17 - 18)  SpO2: 97% (11 Mar 2023 20:10) (95% - 97%)    LABS:                        11.9   8.37  )-----------( 192      ( 11 Mar 2023 04:58 )             35.7     03-11    134<L>  |  98  |  41<H>  ----------------------------<  92  4.0   |  24  |  0.9    Ca    8.6      11 Mar 2023 04:58  Mg     2.3     03-11    TPro  6.9  /  Alb  4.2  /  TBili  0.3  /  DBili  x   /  AST  14  /  ALT  15  /  AlkPhos  64  03-11                  RADIOLOGY:  Xray Chest 1 View- PORTABLE-Routine:   ACC: [id number] EXAM:  XR CHEST PORTABLE ROUTINE 1V   ORDERED BY: RENITA LOVE     PROCEDURE DATE:  03/11/2023          INTERPRETATION:  CLINICAL HISTORY / REASON FOR EXAM: Shortness of breath.    COMPARISON: Chest radiograph from March 9, 2023.    TECHNIQUE/POSITIONING: Satisfactory. Single image, AP portable chest   radiograph.    FINDINGS:    SUPPORT DEVICES: None.    CARDIAC/MEDIASTINUM/HILUM: Unchanged, enlarged cardiac silhouette.    LUNG PARENCHYMA/PLEURA: No focal consolidation or pleural effusion. No   pneumothorax.    SKELETON/SOFT TISSUES: Unchanged.      IMPRESSION:    No radiographic evidence of acute cardiopulmonary disease.    --- End of Report ---            KINA GARVEY MD; Attending Radiologist  This document has been electronically signed. Mar 11 2023  4:29PM (03-11-23 @ 08:19)          ECHO:  < from: TTE Echo Complete w/o Contrast w/ Doppler (03.09.23 @ 06:39) >    Summary:   1. Normal global left ventricular systolicfunction.   2. Severely enlarged left atrium.   3. LV Ejection Fraction by Puente's Method with a biplane EF of 70 %.   4. Mild asymmetric left ventricular hypertrophy.   5. Moderately enlarged right ventricle.   6. Moderately enlarged right atrium.   7. Degenerative mitral valve.   8. Mild thickening of the anterior and posterior mitral valve leaflets.   9. Moderate mitral annular calcification.  10. Severe mitral valve regurgitation.  11. Moderate tricuspid regurgitation.  12. Mild aortic regurgitation.  13. Sclerotic aortic valve with normal opening.  14. Mild pulmonic valve regurgitation.  15. Estimated pulmonary artery systolic pressure is 62.7 mmHg assuming a   right atrial pressure of 3 mmHg, which is consistent with severe   pulmonary hypertension.    < end of copied text >      PHYSICAL EXAM:  GENERAL: NAD, well-developed, AAOx3  HEENT:  Atraumatic, Normocephalic  PULMONARY: decreased b/s in lung bases  CARDIOVASCULAR: Regular rate and rhythm; No murmurs, rubs, or gallops  GASTROINTESTINAL: Soft, Nontender, Nondistended; Bowel sounds present  MUSCULOSKELETAL:  2+ Peripheral Pulses, No clubbing, cyanosis, or edema  NEUROLOGY: non-focal  SKIN: No rashes or lesions

## 2023-03-13 ENCOUNTER — TRANSCRIPTION ENCOUNTER (OUTPATIENT)
Age: 83
End: 2023-03-13

## 2023-03-13 LAB
ALBUMIN SERPL ELPH-MCNC: 4.2 G/DL — SIGNIFICANT CHANGE UP (ref 3.5–5.2)
ALP SERPL-CCNC: 65 U/L — SIGNIFICANT CHANGE UP (ref 30–115)
ALT FLD-CCNC: 17 U/L — SIGNIFICANT CHANGE UP (ref 0–41)
ANION GAP SERPL CALC-SCNC: 11 MMOL/L — SIGNIFICANT CHANGE UP (ref 7–14)
AST SERPL-CCNC: 17 U/L — SIGNIFICANT CHANGE UP (ref 0–41)
BASOPHILS # BLD AUTO: 0.03 K/UL — SIGNIFICANT CHANGE UP (ref 0–0.2)
BASOPHILS NFR BLD AUTO: 0.3 % — SIGNIFICANT CHANGE UP (ref 0–1)
BILIRUB SERPL-MCNC: 0.3 MG/DL — SIGNIFICANT CHANGE UP (ref 0.2–1.2)
BUN SERPL-MCNC: 31 MG/DL — HIGH (ref 10–20)
CALCIUM SERPL-MCNC: 9 MG/DL — SIGNIFICANT CHANGE UP (ref 8.4–10.4)
CHLORIDE SERPL-SCNC: 103 MMOL/L — SIGNIFICANT CHANGE UP (ref 98–110)
CO2 SERPL-SCNC: 25 MMOL/L — SIGNIFICANT CHANGE UP (ref 17–32)
CREAT SERPL-MCNC: 0.8 MG/DL — SIGNIFICANT CHANGE UP (ref 0.7–1.5)
EGFR: 72 ML/MIN/1.73M2 — SIGNIFICANT CHANGE UP
EOSINOPHIL # BLD AUTO: 0.21 K/UL — SIGNIFICANT CHANGE UP (ref 0–0.7)
EOSINOPHIL NFR BLD AUTO: 2.3 % — SIGNIFICANT CHANGE UP (ref 0–8)
GLUCOSE SERPL-MCNC: 91 MG/DL — SIGNIFICANT CHANGE UP (ref 70–99)
HCT VFR BLD CALC: 38.6 % — SIGNIFICANT CHANGE UP (ref 37–47)
HGB BLD-MCNC: 12.8 G/DL — SIGNIFICANT CHANGE UP (ref 12–16)
IMM GRANULOCYTES NFR BLD AUTO: 0.8 % — HIGH (ref 0.1–0.3)
LYMPHOCYTES # BLD AUTO: 4.15 K/UL — HIGH (ref 1.2–3.4)
LYMPHOCYTES # BLD AUTO: 44.8 % — SIGNIFICANT CHANGE UP (ref 20.5–51.1)
MAGNESIUM SERPL-MCNC: 2.4 MG/DL — SIGNIFICANT CHANGE UP (ref 1.8–2.4)
MCHC RBC-ENTMCNC: 30.8 PG — SIGNIFICANT CHANGE UP (ref 27–31)
MCHC RBC-ENTMCNC: 33.2 G/DL — SIGNIFICANT CHANGE UP (ref 32–37)
MCV RBC AUTO: 93 FL — SIGNIFICANT CHANGE UP (ref 81–99)
MONOCYTES # BLD AUTO: 0.79 K/UL — HIGH (ref 0.1–0.6)
MONOCYTES NFR BLD AUTO: 8.5 % — SIGNIFICANT CHANGE UP (ref 1.7–9.3)
NEUTROPHILS # BLD AUTO: 4.02 K/UL — SIGNIFICANT CHANGE UP (ref 1.4–6.5)
NEUTROPHILS NFR BLD AUTO: 43.3 % — SIGNIFICANT CHANGE UP (ref 42.2–75.2)
NRBC # BLD: 0 /100 WBCS — SIGNIFICANT CHANGE UP (ref 0–0)
PLATELET # BLD AUTO: 206 K/UL — SIGNIFICANT CHANGE UP (ref 130–400)
POTASSIUM SERPL-MCNC: 4.4 MMOL/L — SIGNIFICANT CHANGE UP (ref 3.5–5)
POTASSIUM SERPL-SCNC: 4.4 MMOL/L — SIGNIFICANT CHANGE UP (ref 3.5–5)
PROT SERPL-MCNC: 6.9 G/DL — SIGNIFICANT CHANGE UP (ref 6–8)
RBC # BLD: 4.15 M/UL — LOW (ref 4.2–5.4)
RBC # FLD: 13.6 % — SIGNIFICANT CHANGE UP (ref 11.5–14.5)
SARS-COV-2 RNA SPEC QL NAA+PROBE: SIGNIFICANT CHANGE UP
SODIUM SERPL-SCNC: 139 MMOL/L — SIGNIFICANT CHANGE UP (ref 135–146)
WBC # BLD: 9.27 K/UL — SIGNIFICANT CHANGE UP (ref 4.8–10.8)
WBC # FLD AUTO: 9.27 K/UL — SIGNIFICANT CHANGE UP (ref 4.8–10.8)

## 2023-03-13 PROCEDURE — 99233 SBSQ HOSP IP/OBS HIGH 50: CPT

## 2023-03-13 RX ADMIN — Medication 3 MILLILITER(S): at 08:07

## 2023-03-13 RX ADMIN — Medication 40 MILLIGRAM(S): at 05:45

## 2023-03-13 RX ADMIN — POLYETHYLENE GLYCOL 3350 17 GRAM(S): 17 POWDER, FOR SOLUTION ORAL at 12:12

## 2023-03-13 RX ADMIN — PANTOPRAZOLE SODIUM 40 MILLIGRAM(S): 20 TABLET, DELAYED RELEASE ORAL at 05:45

## 2023-03-13 RX ADMIN — Medication 3 MILLILITER(S): at 13:34

## 2023-03-13 NOTE — DISCHARGE NOTE PROVIDER - CARE PROVIDERS DIRECT ADDRESSES
[email or web address]

## 2023-03-13 NOTE — DISCHARGE NOTE PROVIDER - HOSPITAL COURSE
83-year-old female with history of asthma, vertigo presents for SOB. At baseline, patient uses a walker for ambulation. As per patient no history of previous asthma exacerbation or intubations. No personal history of smoking, no exposure to second hand smoke.    Patient was in her usual state of health 3 days ago when she started feeling SOB (increases with exertion, relieved at rest), associated with cough w/yellow phlegm, headache (mild to moderate, increases with coughing, band like, new), chest pain (central location, feels like blowing up, occurs only with cough, not associated with exertion or rest, 7/10 at it's worst), lower extremity swelling. Denies Fever, chills, rigors, dizziness, NVD, abdominal pain, change in urination and change in bowel habits.     2 days ago patient was prescribed prednisone by her PCP which was initially helping with symptoms but today symptoms progressed and are at it's worst, for which she presented to ED. Negative flu/COVID test OP.    Patient endorsing that she had a heart scan done which showed leaky valve.    In the ED, CXR shows L>R pleural effusion and congestion, BNP 1085, Trops -ve, pCO2 37 on VBG. Clinically looks non-toxic on RA.  Vital Signs Last 24 Hrs:  T(F): 98.1 (08 Mar 2023 12:38), Max: 98.1 (08 Mar 2023 12:38)  HR: 77 (08 Mar 2023 12:38) (77 - 77)  BP: 167/67 (08 Mar 2023 12:38) (167/67 - 167/67)  RR: 20 (08 Mar 2023 12:38) (20 - 20)  SpO2: 98% (08 Mar 2023 12:38) (98% - 98%) on RA    patientg with echo remarkable for pulm htn     severe mr     Structural heart consult was placed.    83-year-old female with history of asthma, vertigo presents for SOB. At baseline, patient uses a walker for ambulation. As per patient no history of previous asthma exacerbation or intubations. No personal history of smoking, no exposure to second hand smoke.    Patient was in her usual state of health 3 days ago when she started feeling SOB (increases with exertion, relieved at rest), associated with cough w/yellow phlegm, headache (mild to moderate, increases with coughing, band like, new), chest pain (central location, feels like blowing up, occurs only with cough, not associated with exertion or rest, 7/10 at it's worst), lower extremity swelling. Denies Fever, chills, rigors, dizziness, NVD, abdominal pain, change in urination and change in bowel habits.     2 days ago patient was prescribed prednisone by her PCP which was initially helping with symptoms but today symptoms progressed and are at it's worst, for which she presented to ED. Negative flu/COVID test OP.    Patient endorsing that she had a heart scan done which showed leaky valve.    In the ED, CXR shows L>R pleural effusion and congestion, BNP 1085, Trops -ve, pCO2 37 on VBG. Clinically looks non-toxic on RA.  Vital Signs Last 24 Hrs:  T(F): 98.1 (08 Mar 2023 12:38), Max: 98.1 (08 Mar 2023 12:38)  HR: 77 (08 Mar 2023 12:38) (77 - 77)  BP: 167/67 (08 Mar 2023 12:38) (167/67 - 167/67)  RR: 20 (08 Mar 2023 12:38) (20 - 20)  SpO2: 98% (08 Mar 2023 12:38) (98% - 98%) on RA    patientg with echo remarkable for pulm htn     severe mr     Structural heart consult was placed.         83-year-old female with history of asthma, vertigo presents for SOB. At baseline, patient uses a walker for ambulation    #acute hypoxic respiratory failure-multifactorial - resolving   #pulmonary htn   -on po lasix     #severe MR  -awaiting structural heart consult   #chronic  Asthma- not in exacerbation    Pending: await cardio follow up for severe MR      83-year-old female with history of asthma, vertigo presents for SOB. At baseline, patient uses a walker for ambulation. As per patient no history of previous asthma exacerbation or intubations. No personal history of smoking, no exposure to second hand smoke.    Patient was in her usual state of health 3 days ago when she started feeling SOB (increases with exertion, relieved at rest), associated with cough w/yellow phlegm, headache (mild to moderate, increases with coughing, band like, new), chest pain (central location, feels like blowing up, occurs only with cough, not associated with exertion or rest, 7/10 at it's worst), lower extremity swelling. Denies Fever, chills, rigors, dizziness, NVD, abdominal pain, change in urination and change in bowel habits.     2 days ago patient was prescribed prednisone by her PCP which was initially helping with symptoms but today symptoms progressed and are at it's worst, for which she presented to ED. Negative flu/COVID test OP.    Patient endorsing that she had a heart scan done which showed leaky valve.    In the ED, CXR shows L>R pleural effusion and congestion, BNP 1085, Trops -ve, pCO2 37 on VBG. Clinically looks non-toxic on RA.  Vital Signs Last 24 Hrs:  T(F): 98.1 (08 Mar 2023 12:38), Max: 98.1 (08 Mar 2023 12:38)  HR: 77 (08 Mar 2023 12:38) (77 - 77)  BP: 167/67 (08 Mar 2023 12:38) (167/67 - 167/67)  RR: 20 (08 Mar 2023 12:38) (20 - 20)  SpO2: 98% (08 Mar 2023 12:38) (98% - 98%) on RA    patientg with echo remarkable for pulm htn     severe mr     Structural heart consult was placed.         83-year-old female with history of asthma, vertigo presents for SOB. At baseline, patient uses a walker for ambulation    #acute hypoxic respiratory failure-multifactorial - resolving   #pulmonary htn   -on po lasix     #severe MR  -awaiting structural heart consult   #chronic  Asthma- not in exacerbation

## 2023-03-13 NOTE — DISCHARGE NOTE NURSING/CASE MANAGEMENT/SOCIAL WORK - PATIENT PORTAL LINK FT
You can access the FollowMyHealth Patient Portal offered by [facility] by registering at the following website: [email or web address]. By joining [email or web address]’s FollowMyHealth portal, you will also be able to view your health information using other applications (apps) compatible with our system.

## 2023-03-13 NOTE — DISCHARGE NOTE NURSING/CASE MANAGEMENT/SOCIAL WORK - NSDCPEFALRISK_GEN_ALL_CORE
For information on Fall & Injury Prevention, visit: [email or web address] OR  [email or web address] OR  https://www.cdc.gov/steadi/patient.html

## 2023-03-13 NOTE — PROGRESS NOTE ADULT - SUBJECTIVE AND OBJECTIVE BOX
VIANCA NEVAREZ  83y Female    CHIEF COMPLAINT:    Patient is a 83y old  Female who presents with a chief complaint of sob (13 Mar 2023 13:41)      INTERVAL HPI/OVERNIGHT EVENTS:    Patient seen and examined.    ROS: All other systems are negative.    Vital Signs:    T(F): 96.9 (03-13-23 @ 12:05), Max: 97.3 (03-12-23 @ 20:57)  HR: 77 (03-13-23 @ 12:05) (77 - 80)  BP: 136/63 (03-13-23 @ 12:05) (131/60 - 136/63)  RR: 18 (03-13-23 @ 12:05) (18 - 18)  SpO2: --  I&O's Summary    12 Mar 2023 07:01  -  13 Mar 2023 07:00  --------------------------------------------------------  IN: 450 mL / OUT: 800 mL / NET: -350 mL      Daily     Daily   CAPILLARY BLOOD GLUCOSE          PHYSICAL EXAM:    GENERAL:  NAD  SKIN: No rashes or lesions  HENT: Atraumatic. Normocephalic. PERRL. Moist membranes.  NECK: Supple, No JVD. No lymphadenopathy.  PULMONARY: CTA B/L. No wheezing. No rales  CVS: Normal S1, S2. Rate and Rhythm are regular. No murmurs.  ABDOMEN/GI: Soft, Nontender, Nondistended; BS present  EXTREMITIES: Peripheral pulses intact. No edema B/L LE.  NEUROLOGIC:  No motor or sensory deficit.  PSYCH: Alert & oriented x 3    Consultant(s) Notes Reviewed:  [x ] YES  [ ] NO  Care Discussed with Consultants/Other Providers [ x] YES  [ ] NO    EKG reviewed  Telemetry reviewed    LABS:                        12.8   9.27  )-----------( 206      ( 13 Mar 2023 05:49 )             38.6     03-13    139  |  103  |  31<H>  ----------------------------<  91  4.4   |  25  |  0.8    Ca    9.0      13 Mar 2023 05:49  Mg     2.4     03-13    TPro  6.9  /  Alb  4.2  /  TBili  0.3  /  DBili  x   /  AST  17  /  ALT  17  /  AlkPhos  65  03-13      Serum Pro-Brain Natriuretic Peptide: 1085 pg/mL (03-08-23 @ 15:02)          RADIOLOGY & ADDITIONAL TESTS:    < from: TTE Echo Complete w/o Contrast w/ Doppler (03.09.23 @ 06:39) >    Summary:   1. Normal global left ventricular systolicfunction.   2. Severely enlarged left atrium.   3. LV Ejection Fraction by Puente's Method with a biplane EF of 70 %.   4. Mild asymmetric left ventricular hypertrophy.   5. Moderately enlarged right ventricle.   6. Moderately enlarged right atrium.   7. Degenerative mitral valve.   8. Mild thickening of the anterior and posterior mitral valve leaflets.   9. Moderate mitral annular calcification.  10. Severe mitral valve regurgitation.  11. Moderate tricuspid regurgitation.  12. Mild aortic regurgitation.  13. Sclerotic aortic valve with normal opening.  14. Mild pulmonic valve regurgitation.  15. Estimated pulmonary artery systolic pressure is 62.7 mmHg assuming a   right atrial pressure of 3 mmHg, which is consistent with severe   pulmonary hypertension.    < end of copied text >  < from: Xray Chest 1 View- PORTABLE-Routine (Xray Chest 1 View- PORTABLE-Routine in AM.) (03.11.23 @ 08:19) >  IMPRESSION:    No radiographic evidence of acute cardiopulmonary disease.    < end of copied text >  < from: [facility] Duplex Lower Ext Vein Scan, Bilat (03.12.23 @ 13:31) >  IMPRESSION:  No evidence of deep venous thrombosis in either lower extremity.      < end of copied text >    Imaging or report Personally Reviewed:  [x ] YES  [ ] NO    Medications:  Standing  albuterol/ipratropium for Nebulization 3 milliLiter(s) Nebulizer every 6 hours  enoxaparin Injectable 40 milliGRAM(s) SubCutaneous every 24 hours  fluticasone furoate/vilanterol 100-25 MICROgram(s) Inhaler 1 Puff(s) Inhalation daily  furosemide    Tablet 40 milliGRAM(s) Oral daily  pantoprazole    Tablet 40 milliGRAM(s) Oral before breakfast  polyethylene glycol 3350 17 Gram(s) Oral daily  predniSONE   Tablet 40 milliGRAM(s) Oral every 24 hours  senna 2 Tablet(s) Oral at bedtime    PRN Meds  albuterol    90 MICROgram(s) HFA Inhaler 2 Puff(s) Inhalation every 6 hours PRN  meclizine 25 milliGRAM(s) Oral daily PRN      Case discussed with resident    Care discussed with pt/family

## 2023-03-13 NOTE — DISCHARGE NOTE PROVIDER - NSDCCPCAREPLAN_GEN_ALL_CORE_FT
PRINCIPAL DISCHARGE DIAGNOSIS  Diagnosis: SOB (shortness of breath)  Assessment and Plan of Treatment: You came to the hospital with shortness of breath. You were started on diuuretics, had an echocardiogram, was found to have asevre mitral regurgitation      SECONDARY DISCHARGE DIAGNOSES  Diagnosis: Acute CHF  Assessment and Plan of Treatment:      PRINCIPAL DISCHARGE DIAGNOSIS  Diagnosis: SOB (shortness of breath)  Assessment and Plan of Treatment: You came to the hospital with shortness of breath. You were started on diuretics, had an echocardiogram, was found to have severe mitral regurgitation. Structural heart consult pending      SECONDARY DISCHARGE DIAGNOSES  Diagnosis: Acute CHF  Assessment and Plan of Treatment:      PRINCIPAL DISCHARGE DIAGNOSIS  Diagnosis: SOB (shortness of breath)  Assessment and Plan of Treatment: You came to the hospital with shortness of breath. You were started on diuretics, had an echocardiogram, was found to have severe mitral regurgitation. Structural heart team saw you and ordered transesophageal echo and found not to a candidate for roger clip. Follow up with Dr. Madera and your primary care physician upon discharge.      SECONDARY DISCHARGE DIAGNOSES  Diagnosis: Acute CHF  Assessment and Plan of Treatment:

## 2023-03-13 NOTE — CONSULT NOTE ADULT - ASSESSMENT
Impression   # Severe MR  # Severe pulmonary HTN by TTE    Patient euvolemic and not currently in heart failure     Recommendations   - Plan for PATRICIA tomorrow to evaluate MR and anatomy for possible roger clip   - Please keep NPO aftermidnight and send new covid swab     Discussed with attending.   Signature: Marlon DIAZ, 1st year Cardiology Fellow  
Patient with hx of SOB worsening over last few weeks.  Hx of  asthma on meds treated with prednisone as outpatient and worsened after.     In ED CXR with no signs of CHF however BNP was elevated at 1085  in the setting of nl CE.     Echo with nl LVEF and LVH with LAE and severe MR that is central Jet.     PA pressures are also elevated in the 600s CW PAH probably related to lung disease as well as pulmonary venous component related to MR.     Agree with diuresis and if need for HTN can add ACE or ARB to unload ventricle and MR.   Fu renal function as well     Would call structural heart for eval for MV for potential clipping in the future if needed. 
[0] : 2) Feeling down, depressed, or hopeless: Not at all (0)

## 2023-03-13 NOTE — DISCHARGE NOTE PROVIDER - NSDCMRMEDTOKEN_GEN_ALL_CORE_FT
Albuterol (Eqv-ProAir HFA) 90 mcg/inh inhalation aerosol: 2 puff(s) inhaled every 6 hours, As Needed  Breo Ellipta 100 mcg-25 mcg/inh inhalation powder: 1 puff(s) inhaled once a day  meclizine 25 mg oral tablet: 1 tab(s) orally 3 times a day, As Needed   Albuterol (Eqv-ProAir HFA) 90 mcg/inh inhalation aerosol: 2 puff(s) inhaled every 6 hours, As Needed  Breo Ellipta 100 mcg-25 mcg/inh inhalation powder: 1 puff(s) inhaled once a day  furosemide 40 mg oral tablet: 1 tab(s) orally once a day  meclizine 25 mg oral tablet: 1 tab(s) orally 3 times a day, As Needed  predniSONE 20 mg oral tablet: 1 tab(s) orally once a day

## 2023-03-13 NOTE — CONSULT NOTE ADULT - SUBJECTIVE AND OBJECTIVE BOX
Outpt cardiologist:    Reason for Consult:     HISTORY OF PRESENT ILLNESS:  *Chinese speaker, interpretation by daughter at bedside*    83-year-old female with history of asthma, vertigo presents for SOB. At baseline, patient uses a walker for ambulation. As per patient no history of previous asthma exacerbation or intubations. No personal history of smoking, no exposure to second hand smoke.    Patient was in her usual state of health 3 days ago when she started feeling SOB (increases with exertion, relieved at rest), associated with cough w/yellow phlegm, headache (mild to moderate, increases with coughing, band like, new), chest pain (central location, feels like blowing up, occurs only with cough, not associated with exertion or rest, 7/10 at it's worst), lower extremity swelling. Denies Fever, chills, rigors, dizziness, NVD, abdominal pain, change in urination and change in bowel habits.     2 days ago patient was prescribed prednisone by her PCP which was initially helping with symptoms but today symptoms progressed and are at it's worst, for which she presented to ED. Negative flu/COVID test OP.    Patient endorsing that she had a heart scan done which showed leaky valve.    In the ED, CXR shows L>R pleural effusion and congestion, BNP 1085, Trops -ve, pCO2 37 on VBG. Clinically looks non-toxic on RA.  Vital Signs Last 24 Hrs:  T(F): 98.1 (08 Mar 2023 12:38), Max: 98.1 (08 Mar 2023 12:38)  HR: 77 (08 Mar 2023 12:38) (77 - 77)  BP: 167/67 (08 Mar 2023 12:38) (167/67 - 167/67)  RR: 20 (08 Mar 2023 12:38) (20 - 20)  SpO2: 98% (08 Mar 2023 12:38) (98% - 98%) on RA   (08 Mar 2023 21:25)      Cardiology Fellow Notes    Patient with known trace MR 3 years ago   Asthma COPD  Admitted for asthma exacerbation     Echo found normal EF with severe MR    Of note, the patient has been endorsing worsening JENKINS and decreased activity level over the past few months       PAST MEDICAL & SURGICAL HISTORY  Asthma        FAMILY HISTORY:  FAMILY HISTORY:      SOCIAL HISTORY:  Social History:      ALLERGIES:  No Known Allergies      MEDICATIONS:  albuterol/ipratropium for Nebulization 3 milliLiter(s) Nebulizer every 6 hours  enoxaparin Injectable 40 milliGRAM(s) SubCutaneous every 24 hours  fluticasone furoate/vilanterol 100-25 MICROgram(s) Inhaler 1 Puff(s) Inhalation daily  furosemide    Tablet 40 milliGRAM(s) Oral daily  pantoprazole    Tablet 40 milliGRAM(s) Oral before breakfast  polyethylene glycol 3350 17 Gram(s) Oral daily  predniSONE   Tablet 40 milliGRAM(s) Oral every 24 hours  senna 2 Tablet(s) Oral at bedtime    PRN:  albuterol    90 MICROgram(s) HFA Inhaler 2 Puff(s) Inhalation every 6 hours PRN  meclizine 25 milliGRAM(s) Oral daily PRN      HOME MEDICATIONS:  Home Medications:  Albuterol (Eqv-ProAir HFA) 90 mcg/inh inhalation aerosol: 2 puff(s) inhaled every 6 hours, As Needed (08 Mar 2023 21:28)  Breo Ellipta 100 mcg-25 mcg/inh inhalation powder: 1 puff(s) inhaled once a day (08 Mar 2023 21:28)  meclizine 25 mg oral tablet: 1 tab(s) orally 3 times a day, As Needed (08 Mar 2023 21:28)      VITALS:   T(F): 96.9 (03-13 @ 12:05), Max: 97.9 (03-10 @ 14:42)  HR: 77 (03-13 @ 12:05) (64 - 84)  BP: 136/63 (03-13 @ 12:05) (113/70 - 168/70)  BP(mean): --  RR: 18 (03-13 @ 12:05) (17 - 18)  SpO2: 96% (03-12 @ 04:55) (95% - 97%)    I&O's Summary    12 Mar 2023 07:01  -  13 Mar 2023 07:00  --------------------------------------------------------  IN: 450 mL / OUT: 800 mL / NET: -350 mL        REVIEW OF SYSTEMS:  CONSTITUTIONAL: No weakness, fevers or chills  HEENT: No visual changes, neck/ear pain  RESPIRATORY: No cough, sob  CARDIOVASCULAR: See HPI  GASTROINTESTINAL: No abdominal pain. No nausea, vomiting, diarrhea   GENITOURINARY: No dysuria, frequency or hematuria  NEUROLOGICAL: No new focal deficits  SKIN: No new rashes    PHYSICAL EXAM:  General: Not in distress.  Non-toxic appearing.   Cardio: regular, S1, S2, no murmur  Pulm: B/L BS.  No wheezing / crackles / rales  Abdomen: Soft, non-tender, non-distended. Normoactive bowel sounds  Extremities: No edema b/l le  Neuro: A&O x3. No focal deficits    LABS:                        12.8   9.27  )-----------( 206      ( 13 Mar 2023 05:49 )             38.6     03-13    139  |  103  |  31<H>  ----------------------------<  91  4.4   |  25  |  0.8    Ca    9.0      13 Mar 2023 05:49  Mg     2.4     03-13    TPro  6.9  /  Alb  4.2  /  TBili  0.3  /  DBili  x   /  AST  17  /  ALT  17  /  AlkPhos  65  03-13              Troponin trend:      03-09 Chol 184 LDL -- HDL 64 Trig 48      IMAGING:  -TTE:  < from: TTE Echo Complete w/o Contrast w/ Doppler (03.09.23 @ 06:39) >  Summary:   1. Normal global left ventricular systolicfunction.   2. Severely enlarged left atrium.   3. LV Ejection Fraction by Puente's Method with a biplane EF of 70 %.   4. Mild asymmetric left ventricular hypertrophy.   5. Moderately enlarged right ventricle.   6. Moderately enlarged right atrium.   7. Degenerative mitral valve.   8. Mild thickening of the anterior and posterior mitral valve leaflets.   9. Moderate mitral annular calcification.  10. Severe mitral valve regurgitation.  11. Moderate tricuspid regurgitation.  12. Mild aortic regurgitation.  13. Sclerotic aortic valve with normal opening.  14. Mild pulmonic valve regurgitation.  15. Estimated pulmonary artery systolic pressure is 62.7 mmHg assuming a   right atrial pressure of 3 mmHg, which is consistent with severe   pulmonary hypertension.      ECG:      TELEMETRY EVENTS:
  CHIEF COMPLAINT:Patient is a 83y old  Female who presents with a chief complaint of     HISTORY OF PRESENT ILLNESS:   HPI:  *Samoan speaker, interpretation by daughter at bedside*    83-year-old female with history of asthma, vertigo presents for SOB. At baseline, patient uses a walker for ambulation. As per patient no history of previous asthma exacerbation or intubations. No personal history of smoking, no exposure to second hand smoke.    Patient was in her usual state of health 3 days ago when she started feeling SOB (increases with exertion, relieved at rest), associated with cough w/yellow phlegm, headache (mild to moderate, increases with coughing, band like, new), chest pain (central location, feels like blowing up, occurs only with cough, not associated with exertion or rest, 7/10 at it's worst), lower extremity swelling. Denies Fever, chills, rigors, dizziness, NVD, abdominal pain, change in urination and change in bowel habits.     2 days ago patient was prescribed prednisone by her PCP which was initially helping with symptoms but today symptoms progressed and are at it's worst, for which she presented to ED. Negative flu/COVID test OP.    Patient endorsing that she had a heart scan done which showed leaky valve.    In the ED, CXR shows L>R pleural effusion and congestion, BNP 1085, Trops -ve, pCO2 37 on VBG. Clinically looks non-toxic on RA.  Vital Signs Last 24 Hrs:  T(F): 98.1 (08 Mar 2023 12:38), Max: 98.1 (08 Mar 2023 12:38)  HR: 77 (08 Mar 2023 12:38) (77 - 77)  BP: 167/67 (08 Mar 2023 12:38) (167/67 - 167/67)  RR: 20 (08 Mar 2023 12:38) (20 - 20)  SpO2: 98% (08 Mar 2023 12:38) (98% - 98%) on RA   (08 Mar 2023 21:25)    PAST MEDICAL & SURGICAL HISTORY:  Asthma        FAMILY HISTORY:    Allergies    No Known Allergies    Intolerances    codeine (Vomiting; Headache; Nausea; Drowsiness)  	  Home Medications:  Albuterol (Eqv-ProAir HFA) 90 mcg/inh inhalation aerosol: 2 puff(s) inhaled every 6 hours, As Needed (08 Mar 2023 21:28)  Breo Ellipta 100 mcg-25 mcg/inh inhalation powder: 1 puff(s) inhaled once a day (08 Mar 2023 21:28)  meclizine 25 mg oral tablet: 1 tab(s) orally 3 times a day, As Needed (08 Mar 2023 21:28)    MEDICATIONS  (STANDING):  albuterol/ipratropium for Nebulization 3 milliLiter(s) Nebulizer every 6 hours  enoxaparin Injectable 40 milliGRAM(s) SubCutaneous every 24 hours  furosemide   Injectable 40 milliGRAM(s) IV Push every 12 hours  pantoprazole    Tablet 40 milliGRAM(s) Oral before breakfast  polyethylene glycol 3350 17 Gram(s) Oral daily  senna 2 Tablet(s) Oral at bedtime    MEDICATIONS  (PRN):  albuterol    90 MICROgram(s) HFA Inhaler 2 Puff(s) Inhalation every 6 hours PRN Shortness of Breath and/or Wheezing  meclizine 25 milliGRAM(s) Oral daily PRN Dizziness        SOCIAL HISTORY:    [ ] Non-smoker  [ ] Smoker  [ ] Alcohol      REVIEW OF SYSTEMS:    PHYSICAL EXAM:  T(C): 36.2 (03-09-23 @ 08:01), Max: 36.6 (03-08-23 @ 23:38)  HR: 70 (03-09-23 @ 08:01) (70 - 86)  BP: 130/64 (03-09-23 @ 08:01) (130/64 - 161/71)  RR: 20 (03-09-23 @ 08:01) (16 - 20)  SpO2: 97% (03-09-23 @ 08:01) (97% - 99%)  Wt(kg): --  I&O's Summary    Daily     Daily     General Appearance: Normal	  Cardiovascular: Normal S1 S2, No JVD, No murmurs, No edema  Respiratory: Lungs clear to auscultation	  Psychiatry: A & O x 3, Mood & affect appropriate  Gastrointestinal:  Soft, Non-tender  Skin: No rashes, No ecchymoses, No cyanosis	  Neurologic: Non-focal  Extremities: Normal range of motion, No clubbing, cyanosis or edema  Vascular: Peripheral pulses palpable 2+ bilaterally        LABS:	     BNP 1085   CE negative X 2	                        10.6   6.35  )-----------( 185      ( 09 Mar 2023 05:28 )             32.1     03-09    139  |  103  |  25<H>  ----------------------------<  131<H>  4.4   |  23  |  0.7  03-08    136  |  103  |  24<H>  ----------------------------<  138<H>  4.5   |  22  |  0.6<L>    Ca    8.6      09 Mar 2023 05:28  Ca    8.8      08 Mar 2023 15:02    TPro  6.8  /  Alb  4.1  /  TBili  0.3  /  DBili  x   /  AST  15  /  ALT  16  /  AlkPhos  67  03-09  TPro  7.1  /  Alb  4.2  /  TBili  0.3  /  DBili  x   /  AST  19  /  ALT  19  /  AlkPhos  65  03-08      proBNP: Serum Pro-Brain Natriuretic Peptide: 1085 pg/mL (03-08 @ 15:02)    Lipid Profile:   HgA1c:   TSH:       CARDIAC MARKERS:            TELEMETRY EVENTS: 	    ECG:  	  RADIOLOGY:< from: Xray Chest 1 View- PORTABLE-Routine (Xray Chest 1 View- PORTABLE-Routine in AM.) (03.09.23 @ 05:52) >  NTERPRETATION:  Clinical History / Reason for exam: Shortness of breath    Comparison : Chest radiograph prior day.    Technique/Positioning: Frontal portable.    Findings:    Support devices: Telemetry leads    Cardiac/mediastinum/hilum: Cardiomegaly    Lung parenchyma/Pleura: Within normal limits.    Skeleton/soft tissues: Unremarkable.    Impression:    Cardiomegaly. Otherwise unremarkable.    < end of copied text >    	    PREVIOUS DIAGNOSTIC TESTING:    [ ] Echocardiogram:< from: TTE Echo Complete w/o Contrast w/ Doppler (03.09.23 @ 06:39) >   1. Normal global left ventricular systolicfunction.   2. Severely enlarged left atrium.   3. LV Ejection Fraction by Puente's Method with a biplane EF of 70 %.   4. Mild asymmetric left ventricular hypertrophy.   5. Moderately enlarged right ventricle.   6. Moderately enlarged right atrium.   7. Degenerative mitral valve.   8. Mild thickening of the anterior and posterior mitral valve leaflets.   9. Moderate mitral annular calcification.  10. Severe mitral valve regurgitation.  11. Moderate tricuspid regurgitation.  12. Mild aortic regurgitation.  13. Sclerotic aortic valve with normal opening.  14. Mild pulmonic valve regurgitation.  15. Estimated pulmonary artery systolic pressure is 62.7 mmHg assuming a   right atrial pressure of 3 mmHg, which is consistent with severe   pulmonary hypertension.    < end of copied text >    [ ]  Catheterization:  [ ] Stress Test:

## 2023-03-13 NOTE — PROGRESS NOTE ADULT - SUBJECTIVE AND OBJECTIVE BOX
VIANCA NEVAREZ 83y Female  MRN#: [id number]   CODE STATUS:________    Hospital Day: 5d    patient with improved respiratory status, echo with evidence of pulm htn awaiting strucural heart consult with Dr. Madera for severe MR                                            OBJECTIVE  PAST MEDICAL & SURGICAL HISTORY  Asthma                                                ALLERGIES:  No Known Allergies                           HOME MEDICATIONS  Home Medications:  Albuterol (Eqv-ProAir HFA) 90 mcg/inh inhalation aerosol: 2 puff(s) inhaled every 6 hours, As Needed (08 Mar 2023 21:28)  Breo Ellipta 100 mcg-25 mcg/inh inhalation powder: 1 puff(s) inhaled once a day (08 Mar 2023 21:28)  meclizine 25 mg oral tablet: 1 tab(s) orally 3 times a day, As Needed (08 Mar 2023 21:28)                           MEDICATIONS:  STANDING MEDICATIONS  albuterol/ipratropium for Nebulization 3 milliLiter(s) Nebulizer every 6 hours  enoxaparin Injectable 40 milliGRAM(s) SubCutaneous every 24 hours  fluticasone furoate/vilanterol 100-25 MICROgram(s) Inhaler 1 Puff(s) Inhalation daily  furosemide    Tablet 40 milliGRAM(s) Oral daily  pantoprazole    Tablet 40 milliGRAM(s) Oral before breakfast  polyethylene glycol 3350 17 Gram(s) Oral daily  predniSONE   Tablet 40 milliGRAM(s) Oral every 24 hours  senna 2 Tablet(s) Oral at bedtime    PRN MEDICATIONS  albuterol    90 MICROgram(s) HFA Inhaler 2 Puff(s) Inhalation every 6 hours PRN  meclizine 25 milliGRAM(s) Oral daily PRN                                            ------------------------------------------------------------  VITAL SIGNS: Last 24 Hours  T(C): 36.3 (12 Mar 2023 20:57), Max: 36.3 (12 Mar 2023 20:57)  T(F): 97.3 (12 Mar 2023 20:57), Max: 97.3 (12 Mar 2023 20:57)  HR: 80 (12 Mar 2023 20:57) (80 - 82)  BP: 131/60 (12 Mar 2023 20:57) (131/60 - 145/63)  BP(mean): --  RR: 18 (12 Mar 2023 20:57) (18 - 18)  SpO2: --      03-12-23 @ 07:01  -  03-13-23 @ 07:00  --------------------------------------------------------  IN: 450 mL / OUT: 800 mL / NET: -350 mL                                               LABS:                        12.8   9.27  )-----------( 206      ( 13 Mar 2023 05:49 )             38.6     03-13    139  |  103  |  31<H>  ----------------------------<  91  4.4   |  25  |  0.8    Ca    9.0      13 Mar 2023 05:49  Mg     2.4     03-13    TPro  6.9  /  Alb  4.2  /  TBili  0.3  /  DBili  x   /  AST  17  /  ALT  17  /  AlkPhos  65  03-13                                                              RADIOLOGY:        PHYSICAL EXAM:  GENERAL: NAD, lying in bed comfortably  HEAD:  Atraumatic, Normocephalic  EYES: EOMI, PERRLA, conjunctiva and sclera clear  ENT: Moist mucous membranes  NECK: Supple, No JVD  CHEST/LUNG: Clear to auscultation bilaterally; No rales, rhonchi, wheezing, or rubs. Unlabored respirations  HEART: Regular rate and rhythm; No murmurs, rubs, or gallops  ABDOMEN: BSx4; Soft, nontender, nondistended  EXTREMITIES:  2+ Peripheral Pulses, brisk capillary refill. No clubbing, cyanosis, or edema  NERVOUS SYSTEM:  A&Ox3, no focal deficits   SKIN: No rashes or lesions                                         ASSESSMENT & PLAN    83-year-old female with history of asthma, vertigo presents for SOB. At baseline, patient uses a walker for ambulation    #acute hypoxic respiratory failure-multifactorial - resolving   #pulmonary htn   -on po lasix     #severe MR  -awaiting structural heart consult   #chronic  Asthma- not in exacerbation    Pending: await cardio follow up for severe MR

## 2023-03-13 NOTE — DISCHARGE NOTE PROVIDER - CARE PROVIDER_API CALL
Rick Madera)  Cardiovascular Disease; Internal Medicine  [address] [facility] [address]  [place] [address]  Phone: [phone]  Fax: [phone]  Follow Up Time: 2 weeks    Dax Herring (DO)  Medicine  [address] [facility] [place] [facility], [address] [place] [address]  Phone: [phone]  Fax: [phone]  Follow Up Time: 2 weeks    Ryan Person)  Cardiovascular Disease; Interventional Cardiology  [address] [place] [address]  Phone: [phone]  Fax: [phone]  Scheduled Appointment: 03/22/2023 01:00 PM   Rick Madera)  Cardiovascular Disease; Internal Medicine  [address] [facility]  [place] [address]  Phone: [phone]  Fax: [phone]  Scheduled Appointment: 03/10/2023 11:20 PM    Dax Herring (DO)  Medicine  [address] [facility] [place] [address]  Phone: [phone]  Fax: [phone]  Follow Up Time: 2 weeks    Ryan Person)  Cardiovascular Disease; Interventional Cardiology  [address] [place] [address]  Phone: [phone]  Fax: [phone]  Scheduled Appointment: 03/22/2023 01:00 PM

## 2023-03-13 NOTE — DISCHARGE NOTE PROVIDER - PROVIDER TOKENS
PROVIDER:[TOKEN:[[id number]],FOLLOWUP:[2 weeks]],PROVIDER:[TOKEN:[[id number]],FOLLOWUP:[2 weeks]],PROVIDER:[TOKEN:[[id number]],SCHEDULEDAPPT:[03/22/2023],SCHEDULEDAPPTTIME:[01:00 PM]] PROVIDER:[TOKEN:[[id number]],SCHEDULEDAPPT:[03/10/2023],SCHEDULEDAPPTTIME:[11:20 PM]],PROVIDER:[TOKEN:[[id number]],FOLLOWUP:[2 weeks]],PROVIDER:[TOKEN:[[id number]],SCHEDULEDAPPT:[03/22/2023],SCHEDULEDAPPTTIME:[01:00 PM]]

## 2023-03-14 LAB
ALBUMIN SERPL ELPH-MCNC: 4 G/DL — SIGNIFICANT CHANGE UP (ref 3.5–5.2)
ALP SERPL-CCNC: 57 U/L — SIGNIFICANT CHANGE UP (ref 30–115)
ALT FLD-CCNC: 26 U/L — SIGNIFICANT CHANGE UP (ref 0–41)
ANION GAP SERPL CALC-SCNC: 13 MMOL/L — SIGNIFICANT CHANGE UP (ref 7–14)
AST SERPL-CCNC: 29 U/L — SIGNIFICANT CHANGE UP (ref 0–41)
BASOPHILS # BLD AUTO: 0.02 K/UL — SIGNIFICANT CHANGE UP (ref 0–0.2)
BASOPHILS NFR BLD AUTO: 0.2 % — SIGNIFICANT CHANGE UP (ref 0–1)
BILIRUB SERPL-MCNC: 0.3 MG/DL — SIGNIFICANT CHANGE UP (ref 0.2–1.2)
BUN SERPL-MCNC: 31 MG/DL — HIGH (ref 10–20)
CALCIUM SERPL-MCNC: 8.7 MG/DL — SIGNIFICANT CHANGE UP (ref 8.4–10.5)
CHLORIDE SERPL-SCNC: 104 MMOL/L — SIGNIFICANT CHANGE UP (ref 98–110)
CO2 SERPL-SCNC: 22 MMOL/L — SIGNIFICANT CHANGE UP (ref 17–32)
CREAT SERPL-MCNC: 0.7 MG/DL — SIGNIFICANT CHANGE UP (ref 0.7–1.5)
EGFR: 85 ML/MIN/1.73M2 — SIGNIFICANT CHANGE UP
EOSINOPHIL # BLD AUTO: 0.23 K/UL — SIGNIFICANT CHANGE UP (ref 0–0.7)
EOSINOPHIL NFR BLD AUTO: 2.5 % — SIGNIFICANT CHANGE UP (ref 0–8)
GLUCOSE SERPL-MCNC: 79 MG/DL — SIGNIFICANT CHANGE UP (ref 70–99)
HCT VFR BLD CALC: 36.1 % — LOW (ref 37–47)
HGB BLD-MCNC: 12.1 G/DL — SIGNIFICANT CHANGE UP (ref 12–16)
IMM GRANULOCYTES NFR BLD AUTO: 0.5 % — HIGH (ref 0.1–0.3)
LYMPHOCYTES # BLD AUTO: 3.63 K/UL — HIGH (ref 1.2–3.4)
LYMPHOCYTES # BLD AUTO: 39.6 % — SIGNIFICANT CHANGE UP (ref 20.5–51.1)
MAGNESIUM SERPL-MCNC: 2.3 MG/DL — SIGNIFICANT CHANGE UP (ref 1.8–2.4)
MCHC RBC-ENTMCNC: 31.1 PG — HIGH (ref 27–31)
MCHC RBC-ENTMCNC: 33.5 G/DL — SIGNIFICANT CHANGE UP (ref 32–37)
MCV RBC AUTO: 92.8 FL — SIGNIFICANT CHANGE UP (ref 81–99)
MONOCYTES # BLD AUTO: 0.89 K/UL — HIGH (ref 0.1–0.6)
MONOCYTES NFR BLD AUTO: 9.7 % — HIGH (ref 1.7–9.3)
NEUTROPHILS # BLD AUTO: 4.34 K/UL — SIGNIFICANT CHANGE UP (ref 1.4–6.5)
NEUTROPHILS NFR BLD AUTO: 47.5 % — SIGNIFICANT CHANGE UP (ref 42.2–75.2)
NRBC # BLD: 0 /100 WBCS — SIGNIFICANT CHANGE UP (ref 0–0)
PHOSPHATE SERPL-MCNC: 3.7 MG/DL — SIGNIFICANT CHANGE UP (ref 2.1–4.9)
PLATELET # BLD AUTO: 193 K/UL — SIGNIFICANT CHANGE UP (ref 130–400)
POTASSIUM SERPL-MCNC: 4.8 MMOL/L — SIGNIFICANT CHANGE UP (ref 3.5–5)
POTASSIUM SERPL-SCNC: 4.8 MMOL/L — SIGNIFICANT CHANGE UP (ref 3.5–5)
PROT SERPL-MCNC: 6.4 G/DL — SIGNIFICANT CHANGE UP (ref 6–8)
RBC # BLD: 3.89 M/UL — LOW (ref 4.2–5.4)
RBC # FLD: 13.7 % — SIGNIFICANT CHANGE UP (ref 11.5–14.5)
SODIUM SERPL-SCNC: 139 MMOL/L — SIGNIFICANT CHANGE UP (ref 135–146)
WBC # BLD: 9.16 K/UL — SIGNIFICANT CHANGE UP (ref 4.8–10.8)
WBC # FLD AUTO: 9.16 K/UL — SIGNIFICANT CHANGE UP (ref 4.8–10.8)

## 2023-03-14 PROCEDURE — 93320 DOPPLER ECHO COMPLETE: CPT | Mod: 26

## 2023-03-14 PROCEDURE — 93325 DOPPLER ECHO COLOR FLOW MAPG: CPT | Mod: 26

## 2023-03-14 PROCEDURE — 99232 SBSQ HOSP IP/OBS MODERATE 35: CPT

## 2023-03-14 PROCEDURE — 93312 ECHO TRANSESOPHAGEAL: CPT | Mod: 26,XU

## 2023-03-14 RX ADMIN — PANTOPRAZOLE SODIUM 40 MILLIGRAM(S): 20 TABLET, DELAYED RELEASE ORAL at 08:01

## 2023-03-14 RX ADMIN — Medication 40 MILLIGRAM(S): at 05:28

## 2023-03-14 RX ADMIN — Medication 3 MILLILITER(S): at 07:56

## 2023-03-14 RX ADMIN — Medication 40 MILLIGRAM(S): at 05:29

## 2023-03-14 NOTE — PROGRESS NOTE ADULT - SUBJECTIVE AND OBJECTIVE BOX
VIANCA NEVAREZ 83y Female  MRN#: [id number]   CODE STATUS:________    Hospital Day: 6d  patient with pulm htn admitted for sob found to have severe mr structural service planning for carolann today been npo since midnight feeling well no acute events overnight                                           OBJECTIVE  PAST MEDICAL & SURGICAL HISTORY  Asthma                                                ALLERGIES:  No Known Allergies                           HOME MEDICATIONS  Home Medications:  Albuterol (Eqv-ProAir HFA) 90 mcg/inh inhalation aerosol: 2 puff(s) inhaled every 6 hours, As Needed (08 Mar 2023 21:28)  Breo Ellipta 100 mcg-25 mcg/inh inhalation powder: 1 puff(s) inhaled once a day (08 Mar 2023 21:28)  meclizine 25 mg oral tablet: 1 tab(s) orally 3 times a day, As Needed (08 Mar 2023 21:28)                           MEDICATIONS:  STANDING MEDICATIONS  albuterol/ipratropium for Nebulization 3 milliLiter(s) Nebulizer every 6 hours  enoxaparin Injectable 40 milliGRAM(s) SubCutaneous every 24 hours  fluticasone furoate/vilanterol 100-25 MICROgram(s) Inhaler 1 Puff(s) Inhalation daily  furosemide    Tablet 40 milliGRAM(s) Oral daily  pantoprazole    Tablet 40 milliGRAM(s) Oral before breakfast  polyethylene glycol 3350 17 Gram(s) Oral daily  predniSONE   Tablet 40 milliGRAM(s) Oral every 24 hours  senna 2 Tablet(s) Oral at bedtime    PRN MEDICATIONS  albuterol    90 MICROgram(s) HFA Inhaler 2 Puff(s) Inhalation every 6 hours PRN  meclizine 25 milliGRAM(s) Oral daily PRN                                            ------------------------------------------------------------  VITAL SIGNS: Last 24 Hours  T(C): 35.8 (14 Mar 2023 04:40), Max: 36.6 (13 Mar 2023 20:11)  T(F): 96.5 (14 Mar 2023 04:40), Max: 97.9 (13 Mar 2023 20:11)  HR: 69 (14 Mar 2023 04:40) (69 - 77)  BP: 136/60 (14 Mar 2023 04:40) (136/60 - 175/72)  BP(mean): --  RR: 18 (14 Mar 2023 04:40) (18 - 18)  SpO2: --                                               LABS:                        12.8   9.27  )-----------( 206      ( 13 Mar 2023 05:49 )             38.6     03-13    139  |  103  |  31<H>  ----------------------------<  91  4.4   |  25  |  0.8    Ca    9.0      13 Mar 2023 05:49  Mg     2.4     03-13    TPro  6.9  /  Alb  4.2  /  TBili  0.3  /  DBili  x   /  AST  17  /  ALT  17  /  AlkPhos  65  03-13                                                              RADIOLOGY:        PHYSICAL EXAM:  GENERAL: NAD, lying in bed comfortably  HEAD:  Atraumatic, Normocephalic  EYES: EOMI, PERRLA, conjunctiva and sclera clear  ENT: Moist mucous membranes  NECK: Supple, No JVD  CHEST/LUNG: Clear to auscultation bilaterally; No rales, rhonchi, wheezing, or rubs. Unlabored respirations  HEART: Regular rate and rhythm; No murmurs, rubs, or gallops  ABDOMEN: BSx4; Soft, nontender, nondistended  EXTREMITIES:  2+ Peripheral Pulses, brisk capillary refill. No clubbing, cyanosis, or edema  NERVOUS SYSTEM:  A&Ox3, no focal deficits   SKIN: No rashes or lesions                                         ASSESSMENT & PLAN    83-year-old female with history of asthma, vertigo presents for SOB. At baseline, patient uses a walker for ambulation    #acute hypoxic respiratory failure-multifactorial - resolving   #pulmonary htn   -on po lasix     #severe MR  -seen by structural heart svc yesterday planning for carolann for better mvr characterization planned for am   #chronic  Asthma- not in exacerbation    Pending: CAROLANN

## 2023-03-14 NOTE — PRE-ANESTHESIA EVALUATION ADULT - ANESTHESIA, PREVIOUS REACTION, PROFILE
Goal Outcome Evaluation:  Plan of Care Reviewed With: patient  Patient is alert and oriented x3. Tele monitor shows NSR with PACs with a rate in the 80s, DIMITRIOS and Cardioversion 10/14. Denies pain and SOB. Lung sounds noted diminished, 2L via NC placed while sleeping. Edema at bilateral LE. SBA to the bathroom.              Progress: improving      not sure

## 2023-03-14 NOTE — PROGRESS NOTE ADULT - SUBJECTIVE AND OBJECTIVE BOX
VIANCA NEVAREZ  83y Female    CHIEF COMPLAINT:    Patient is a 83y old  Female who presents with a chief complaint of sob 		 (13 Mar 2023 14:27)      INTERVAL HPI/OVERNIGHT EVENTS:    Patient seen and examined. No sob or cough today. No fever. Scheduled for PATRICIA today.     ROS: All other systems are negative.    Vital Signs:    T(F): 96.5 (23 @ 04:40), Max: 97.9 (23 @ 20:11)  HR: 69 (23 @ 04:40) (69 - 77)  BP: 136/60 (23 @ 04:40) (136/60 - 175/72)  RR: 18 (23 @ 04:40) (18 - 18)  SpO2: --  I&O's Summary    Daily     Daily Weight in k.4 (14 Mar 2023 04:40)  CAPILLARY BLOOD GLUCOSE          PHYSICAL EXAM:    GENERAL:  NAD  SKIN: No rashes or lesions  HENT: Atraumatic. Normocephalic. PERRL. Moist membranes.  NECK: Supple, No JVD. No lymphadenopathy.  PULMONARY: CTA B/L. No wheezing. No rales  CVS: Normal S1, S2. Rate and Rhythm are regular.   ABDOMEN/GI: Soft, Nontender, Nondistended; BS present  EXTREMITIES: Peripheral pulses intact. No edema B/L LE.  NEUROLOGIC:  No motor or sensory deficit.  PSYCH: Alert & oriented x 3    Consultant(s) Notes Reviewed:  [x ] YES  [ ] NO  Care Discussed with Consultants/Other Providers [ x] YES  [ ] NO    EKG reviewed  Telemetry reviewed    LABS:                        12.1   9.16  )-----------( 193      ( 14 Mar 2023 05:35 )             36.1     -    139  |  104  |  31<H>  ----------------------------<  79  4.8   |  22  |  0.7    Ca    8.7      14 Mar 2023 05:35  Phos  3.7       Mg     2.3         TPro  6.4  /  Alb  4.0  /  TBili  0.3  /  DBili  x   /  AST  29  /  ALT  26  /  AlkPhos  57  -      Serum Pro-Brain Natriuretic Peptide: 1085 pg/mL (23 @ 15:02)          RADIOLOGY & ADDITIONAL TESTS:      Imaging or report Personally Reviewed:  [ ] YES  [ ] NO    Medications:  Standing  albuterol/ipratropium for Nebulization 3 milliLiter(s) Nebulizer every 6 hours  enoxaparin Injectable 40 milliGRAM(s) SubCutaneous every 24 hours  fluticasone furoate/vilanterol 100-25 MICROgram(s) Inhaler 1 Puff(s) Inhalation daily  furosemide    Tablet 40 milliGRAM(s) Oral daily  pantoprazole    Tablet 40 milliGRAM(s) Oral before breakfast  polyethylene glycol 3350 17 Gram(s) Oral daily  predniSONE   Tablet 40 milliGRAM(s) Oral every 24 hours  senna 2 Tablet(s) Oral at bedtime    PRN Meds  albuterol    90 MICROgram(s) HFA Inhaler 2 Puff(s) Inhalation every 6 hours PRN  meclizine 25 milliGRAM(s) Oral daily PRN      Case discussed with resident    Care discussed with pt/family

## 2023-03-14 NOTE — PRE-ANESTHESIA EVALUATION ADULT - NSATTENDATTESTRD_GEN_ALL_CORE
The patient has been re-examined and I agree with the above assessment or I updated with my findings.
numerical 0-10

## 2023-03-14 NOTE — CHART NOTE - NSCHARTNOTEFT_GEN_A_CORE
POST OPERATIVE PROCEDURAL DOCUMENTATION  PRE-OP DIAGNOSIS:  MR    POST-OP DIAGNOSIS:  Severe MR    PROCEDURE: Transesophageal echocardiogram    Primary Physician: Dr. Sal  Assistant: Dr. Duong    ANESTHESIA TYPE  [  ] General Anesthesia  [ x ] Conscious Sedation  [  ] Local/Regional    CONDITION  [  ] Critical  [  ] Serious  [  ] Fair  [x  ] Good    SPECIMENS REMOVED (IF APPLICABLE): N/A    IMPLANTS (IF APPLICABLE): None    ESTIMATED BLOOD LOSS: None    COMPLICATIONS: None      FINDINGS:    After risks and benefits of procedures were explained, informed consent was obtained and placed in chart. Refer to Anesthesia note for sedation details.  The PATRICIA probe was passed into the esophagus without difficulty.  Transesophageal and transgastric images were obtained.  The PATRICIA probe was removed without difficulty and examined.  There was no evidence for bleeding.  The patient tolerated the procedure well without any immediate PATRICIA-related complications.      Preliminary Findings:  LA: Enlarged   TYLER: Left atrial appendage was clear of clot and smoke.  LV: LVEF was estimated at 60-65%  MV: calcified mitral valve leaflets with malcoaptation. Severe MR with three jets, calculated regurgitant volume above 60 ML  AV: mild AI, no evidence of AS.   RA: Enlarged   TV: mild TR.   PV: no PI.   IAS: no PFO. No R-> L shunt.   There was mild, non-mobile atheroma seen in the thoracic aorta.       DIAGNOSIS/IMPRESSION:  Severe MR    PLAN OF CARE:  Return to inpatient bed   Structural and CT surgery follow up  cont with GDMT POST OPERATIVE PROCEDURAL DOCUMENTATION  PRE-OP DIAGNOSIS:  MR    POST-OP DIAGNOSIS:  Severe MR    PROCEDURE: Transesophageal echocardiogram    Primary Physician: Dr. Sal  Assistant: Dr. Duong    ANESTHESIA TYPE  [  ] General Anesthesia  [ x ] Conscious Sedation  [  ] Local/Regional    CONDITION  [  ] Critical  [  ] Serious  [  ] Fair  [x  ] Good    SPECIMENS REMOVED (IF APPLICABLE): N/A    IMPLANTS (IF APPLICABLE): None    ESTIMATED BLOOD LOSS: None    COMPLICATIONS: None      FINDINGS:    After risks and benefits of procedures were explained, informed consent was obtained and placed in chart. Refer to Anesthesia note for sedation details.  The PATRICIA probe was passed into the esophagus without difficulty.  Transesophageal and transgastric images were obtained.  The PATRICIA probe was removed without difficulty and examined.  There was no evidence for bleeding.  The patient tolerated the procedure well without any immediate PATRICIA-related complications.      Preliminary Findings:  < from: Transesophageal Echocardiogram (03.14.23 @ 14:52) >     1. Left ventricular ejection fraction, by visual estimation, is 55 to   60%.   2. Normal global left ventricular systolic function.   3. Mild thickening and calcification of the anterior and posterior   mitral valve leaflets.   4. Moderate mitral annular calcification.   5. Moderate to severe mitral valve regurgitation.   6. Multiple centrally directed MR jets along the coaptation line. mean   trans mitral pressure gradient is 3 mmHG . Mitral valve area by 3D   planimetry is 2.4 cm2.   7. Mild tricuspid regurgitation.   8. Right atrial enlargement.   9. Left atrial enlargement.    < end of copied text >          DIAGNOSIS/IMPRESSION:  Severe MR    PLAN OF CARE:  Return to inpatient bed   Structural and CT surgery follow up  cont with GDMT

## 2023-03-15 VITALS
DIASTOLIC BLOOD PRESSURE: 70 MMHG | TEMPERATURE: 97 F | RESPIRATION RATE: 18 BRPM | HEART RATE: 77 BPM | SYSTOLIC BLOOD PRESSURE: 147 MMHG

## 2023-03-15 LAB
ALBUMIN SERPL ELPH-MCNC: 3.9 G/DL — SIGNIFICANT CHANGE UP (ref 3.5–5.2)
ALP SERPL-CCNC: 55 U/L — SIGNIFICANT CHANGE UP (ref 30–115)
ALT FLD-CCNC: 41 U/L — SIGNIFICANT CHANGE UP (ref 0–41)
ANION GAP SERPL CALC-SCNC: 11 MMOL/L — SIGNIFICANT CHANGE UP (ref 7–14)
AST SERPL-CCNC: 36 U/L — SIGNIFICANT CHANGE UP (ref 0–41)
BASOPHILS # BLD AUTO: 0.02 K/UL — SIGNIFICANT CHANGE UP (ref 0–0.2)
BASOPHILS NFR BLD AUTO: 0.2 % — SIGNIFICANT CHANGE UP (ref 0–1)
BILIRUB SERPL-MCNC: 0.4 MG/DL — SIGNIFICANT CHANGE UP (ref 0.2–1.2)
BUN SERPL-MCNC: 36 MG/DL — HIGH (ref 10–20)
CALCIUM SERPL-MCNC: 8.9 MG/DL — SIGNIFICANT CHANGE UP (ref 8.4–10.5)
CHLORIDE SERPL-SCNC: 100 MMOL/L — SIGNIFICANT CHANGE UP (ref 98–110)
CO2 SERPL-SCNC: 27 MMOL/L — SIGNIFICANT CHANGE UP (ref 17–32)
CREAT SERPL-MCNC: 0.7 MG/DL — SIGNIFICANT CHANGE UP (ref 0.7–1.5)
EGFR: 85 ML/MIN/1.73M2 — SIGNIFICANT CHANGE UP
EOSINOPHIL # BLD AUTO: 0.13 K/UL — SIGNIFICANT CHANGE UP (ref 0–0.7)
EOSINOPHIL NFR BLD AUTO: 1.5 % — SIGNIFICANT CHANGE UP (ref 0–8)
GLUCOSE SERPL-MCNC: 93 MG/DL — SIGNIFICANT CHANGE UP (ref 70–99)
HCT VFR BLD CALC: 36.5 % — LOW (ref 37–47)
HGB BLD-MCNC: 12.2 G/DL — SIGNIFICANT CHANGE UP (ref 12–16)
IMM GRANULOCYTES NFR BLD AUTO: 0.4 % — HIGH (ref 0.1–0.3)
LYMPHOCYTES # BLD AUTO: 3.46 K/UL — HIGH (ref 1.2–3.4)
LYMPHOCYTES # BLD AUTO: 40.5 % — SIGNIFICANT CHANGE UP (ref 20.5–51.1)
MAGNESIUM SERPL-MCNC: 2.4 MG/DL — SIGNIFICANT CHANGE UP (ref 1.8–2.4)
MCHC RBC-ENTMCNC: 30.6 PG — SIGNIFICANT CHANGE UP (ref 27–31)
MCHC RBC-ENTMCNC: 33.4 G/DL — SIGNIFICANT CHANGE UP (ref 32–37)
MCV RBC AUTO: 91.5 FL — SIGNIFICANT CHANGE UP (ref 81–99)
MONOCYTES # BLD AUTO: 0.81 K/UL — HIGH (ref 0.1–0.6)
MONOCYTES NFR BLD AUTO: 9.5 % — HIGH (ref 1.7–9.3)
NEUTROPHILS # BLD AUTO: 4.1 K/UL — SIGNIFICANT CHANGE UP (ref 1.4–6.5)
NEUTROPHILS NFR BLD AUTO: 47.9 % — SIGNIFICANT CHANGE UP (ref 42.2–75.2)
NRBC # BLD: 0 /100 WBCS — SIGNIFICANT CHANGE UP (ref 0–0)
PHOSPHATE SERPL-MCNC: 3.9 MG/DL — SIGNIFICANT CHANGE UP (ref 2.1–4.9)
PLATELET # BLD AUTO: 197 K/UL — SIGNIFICANT CHANGE UP (ref 130–400)
POTASSIUM SERPL-MCNC: 4.3 MMOL/L — SIGNIFICANT CHANGE UP (ref 3.5–5)
POTASSIUM SERPL-SCNC: 4.3 MMOL/L — SIGNIFICANT CHANGE UP (ref 3.5–5)
PROT SERPL-MCNC: 6.3 G/DL — SIGNIFICANT CHANGE UP (ref 6–8)
RBC # BLD: 3.99 M/UL — LOW (ref 4.2–5.4)
RBC # FLD: 13.5 % — SIGNIFICANT CHANGE UP (ref 11.5–14.5)
SODIUM SERPL-SCNC: 138 MMOL/L — SIGNIFICANT CHANGE UP (ref 135–146)
WBC # BLD: 8.55 K/UL — SIGNIFICANT CHANGE UP (ref 4.8–10.8)
WBC # FLD AUTO: 8.55 K/UL — SIGNIFICANT CHANGE UP (ref 4.8–10.8)

## 2023-03-15 PROCEDURE — 99239 HOSP IP/OBS DSCHRG MGMT >30: CPT

## 2023-03-15 PROCEDURE — 99497 ADVNCD CARE PLAN 30 MIN: CPT | Mod: 25

## 2023-03-15 RX ORDER — FUROSEMIDE 40 MG
1 TABLET ORAL
Qty: 0 | Refills: 0 | DISCHARGE
Start: 2023-03-15

## 2023-03-15 RX ORDER — FUROSEMIDE 40 MG
1 TABLET ORAL
Qty: 14 | Refills: 0
Start: 2023-03-15 | End: 2023-03-28

## 2023-03-15 RX ADMIN — PANTOPRAZOLE SODIUM 40 MILLIGRAM(S): 20 TABLET, DELAYED RELEASE ORAL at 05:48

## 2023-03-15 RX ADMIN — Medication 40 MILLIGRAM(S): at 05:48

## 2023-03-15 RX ADMIN — Medication 3 MILLILITER(S): at 08:24

## 2023-03-15 RX ADMIN — POLYETHYLENE GLYCOL 3350 17 GRAM(S): 17 POWDER, FOR SOLUTION ORAL at 11:23

## 2023-03-15 NOTE — PROGRESS NOTE ADULT - SUBJECTIVE AND OBJECTIVE BOX
VIANCA NEVAREZ 83y Female  MRN#: [id number]   CODE STATUS:________    Hospital Day: 7d    carolann yesterday structural heart saying not candidate for clipping now cts consult                                            OBJECTIVE  PAST MEDICAL & SURGICAL HISTORY  Asthma                                                ALLERGIES:  No Known Allergies                           HOME MEDICATIONS  Home Medications:  Albuterol (Eqv-ProAir HFA) 90 mcg/inh inhalation aerosol: 2 puff(s) inhaled every 6 hours, As Needed (08 Mar 2023 21:28)  Breo Ellipta 100 mcg-25 mcg/inh inhalation powder: 1 puff(s) inhaled once a day (08 Mar 2023 21:28)  meclizine 25 mg oral tablet: 1 tab(s) orally 3 times a day, As Needed (08 Mar 2023 21:28)                           MEDICATIONS:  STANDING MEDICATIONS  albuterol/ipratropium for Nebulization 3 milliLiter(s) Nebulizer every 6 hours  enoxaparin Injectable 40 milliGRAM(s) SubCutaneous every 24 hours  fluticasone furoate/vilanterol 100-25 MICROgram(s) Inhaler 1 Puff(s) Inhalation daily  furosemide    Tablet 40 milliGRAM(s) Oral daily  pantoprazole    Tablet 40 milliGRAM(s) Oral before breakfast  polyethylene glycol 3350 17 Gram(s) Oral daily  senna 2 Tablet(s) Oral at bedtime    PRN MEDICATIONS  albuterol    90 MICROgram(s) HFA Inhaler 2 Puff(s) Inhalation every 6 hours PRN  meclizine 25 milliGRAM(s) Oral daily PRN                                            ------------------------------------------------------------  VITAL SIGNS: Last 24 Hours  T(C): 36.2 (15 Mar 2023 04:57), Max: 36.4 (14 Mar 2023 21:12)  T(F): 97.1 (15 Mar 2023 04:57), Max: 97.5 (14 Mar 2023 21:12)  HR: 63 (15 Mar 2023 04:57) (63 - 68)  BP: 135/60 (15 Mar 2023 04:57) (129/59 - 138/63)  BP(mean): 101 (14 Mar 2023 15:47) (101 - 101)  RR: 18 (15 Mar 2023 04:57) (18 - 20)  SpO2: 96% (14 Mar 2023 15:47) (96% - 96%)                                               LABS:                        12.2   8.55  )-----------( 197      ( 15 Mar 2023 05:52 )             36.5     03-14    139  |  104  |  31<H>  ----------------------------<  79  4.8   |  22  |  0.7    Ca    8.7      14 Mar 2023 05:35  Phos  3.7     03-14  Mg     2.3     03-14    TPro  6.4  /  Alb  4.0  /  TBili  0.3  /  DBili  x   /  AST  29  /  ALT  26  /  AlkPhos  57  03-14                                                              RADIOLOGY:        PHYSICAL EXAM:  GENERAL: NAD, lying in bed comfortably  HEAD:  Atraumatic, Normocephalic  EYES: EOMI, PERRLA, conjunctiva and sclera clear  ENT: Moist mucous membranes  NECK: Supple, No JVD  CHEST/LUNG: Clear to auscultation bilaterally; No rales, rhonchi, wheezing, or rubs. Unlabored respirations  HEART: Regular rate and rhythm; No murmurs, rubs, or gallops  ABDOMEN: BSx4; Soft, nontender, nondistended  EXTREMITIES:  2+ Peripheral Pulses, brisk capillary refill. No clubbing, cyanosis, or edema  NERVOUS SYSTEM:  A&Ox3, no focal deficits   SKIN: No rashes or lesions                                         ASSESSMENT & PLAN    83-year-old female with history of asthma, vertigo presents for SOB. At baseline, patient uses a walker for ambulation    #acute hypoxic respiratory failure-multifactorial - resolving   #pulmonary htn   -on po lasix     #severe MR  -seen by structural heart svc had carolann for better mvr characterization not clip candidate   -pending cts consult  #chronic  Asthma- not in exacerbation    Pending: cts consult   45 min discussion with 3 daughters last night (in person and on phone)

## 2023-03-15 NOTE — PROGRESS NOTE ADULT - SUBJECTIVE AND OBJECTIVE BOX
VIANCA NEVAREZ  83y Female    CHIEF COMPLAINT:    Patient is a 83y old  Female who presents with a chief complaint of sob (15 Mar 2023 07:20)      INTERVAL HPI/OVERNIGHT EVENTS:    Patient seen and examined. Feels better. No sob. No cough.     ROS: All other systems are negative.    Vital Signs:    T(F): 97.3 (03-15-23 @ 12:22), Max: 97.5 (03-14-23 @ 21:12)  HR: 77 (03-15-23 @ 12:22) (63 - 77)  BP: 147/70 (03-15-23 @ 12:22) (129/59 - 147/70)  RR: 18 (03-15-23 @ 12:22) (18 - 20)  SpO2: 97% (03-15-23 @ 08:41) (96% - 97%)  I&O's Summary    15 Mar 2023 07:01  -  15 Mar 2023 12:55  --------------------------------------------------------  IN: 210 mL / OUT: 0 mL / NET: 210 mL      Daily     Daily   CAPILLARY BLOOD GLUCOSE          PHYSICAL EXAM:    GENERAL:  NAD  SKIN: No rashes or lesions  HENT: Atraumatic. Normocephalic. PERRL. Moist membranes.  NECK: Supple, No JVD. No lymphadenopathy.  PULMONARY: CTA B/L. No wheezing. No rales  CVS: Normal S1, S2. Rate and Rhythm are regular.   ABDOMEN/GI: Soft, Nontender, Nondistended; BS present  EXTREMITIES: Peripheral pulses intact. No edema B/L LE.  NEUROLOGIC:  No motor or sensory deficit.  PSYCH: Alert & oriented x 3    Consultant(s) Notes Reviewed:  [x ] YES  [ ] NO  Care Discussed with Consultants/Other Providers [ x] YES  [ ] NO    EKG reviewed  Telemetry reviewed    LABS:                        12.2   8.55  )-----------( 197      ( 15 Mar 2023 05:52 )             36.5     03-15    138  |  100  |  36<H>  ----------------------------<  93  4.3   |  27  |  0.7    Ca    8.9      15 Mar 2023 05:52  Phos  3.9     03-15  Mg     2.4     03-15    TPro  6.3  /  Alb  3.9  /  TBili  0.4  /  DBili  x   /  AST  36  /  ALT  41  /  AlkPhos  55  03-15      Serum Pro-Brain Natriuretic Peptide: 1085 pg/mL (03-08-23 @ 15:02)          RADIOLOGY & ADDITIONAL TESTS:      Imaging or report Personally Reviewed:  [ ] YES  [ ] NO    Medications:  Standing  albuterol/ipratropium for Nebulization 3 milliLiter(s) Nebulizer every 6 hours  enoxaparin Injectable 40 milliGRAM(s) SubCutaneous every 24 hours  fluticasone furoate/vilanterol 100-25 MICROgram(s) Inhaler 1 Puff(s) Inhalation daily  furosemide    Tablet 40 milliGRAM(s) Oral daily  pantoprazole    Tablet 40 milliGRAM(s) Oral before breakfast  polyethylene glycol 3350 17 Gram(s) Oral daily  senna 2 Tablet(s) Oral at bedtime    PRN Meds  albuterol    90 MICROgram(s) HFA Inhaler 2 Puff(s) Inhalation every 6 hours PRN  meclizine 25 milliGRAM(s) Oral daily PRN      Case discussed with resident    Care discussed with pt/family

## 2023-03-15 NOTE — PROGRESS NOTE ADULT - CONVERSATION DETAILS
Care d/w the daughter who is a health care proxy. Explained her about the diagnosis and prognosis and discussed GOC. Full code as per daughter.

## 2023-03-15 NOTE — PROGRESS NOTE ADULT - ASSESSMENT
83-year-old female with history of asthma, vertigo presents for SOB. At baseline, patient uses a walker for ambulation    #acute hypoxic respiratory failure-multifactorial - resolving   #severe MR  #chronic  Asthma- not in exacerbation  -on lasix and IV solumedrol - change to oral lasix and po prednisone in am  -change diet to fluid restriction   -check sa02 on ra with ambulation today  -await cardio follow up for severe MR     Progress Note Handoff  Pending Consults: structural cardio  Pending Tests: none  Pending Results: none  Family Discussion: discussed medication, labs, oxygen with ambulation and possible d/c in 48hrs if cleared by structural cardiology   Disposition: Home__x___/SNF______/Other_____/Unknown at this time_____  Spent over 55 min reviewing chart, speaking with patient/family and on coordinating patient care during interdisciplinary rounds 
83-year-old female with history of asthma, vertigo presents for SOB. At baseline, patient uses a walker for ambulation    Asthma exacerbation  Severe MR  Severe pulmonary HTN  No CHF              PLAN:    ·	ECHO reviewed. EF is 70%. Severe MR. Severe pulmonary HTN  ·	Alb/Atrovent neb treat PRN  ·	Cont Symbicort   ·	Cont Prednisone 40 mg po daily  ·	Structural heart disease eval for severe MR  ·	CXR reviewed. Unremarkable  ·	Venous doppler of LE is negative for DVT    Progress Note Handoff    Pending (specify):  Consults___Structural heart disease eval______, Tests________, Test Results_______, Other_________  Family discussion:  Disposition: Home___/SNF___/Other________/Unknown at this time________    Wilmer Poole MD  Spectra: [phone]
83-year-old female with history of asthma, vertigo presents for SOB. At baseline, patient uses a walker for ambulation    Asthma exacerbation  Severe MR  Severe pulmonary HTN  No CHF              PLAN:    ·	ECHO reviewed. EF is 70%. Severe MR. Severe pulmonary HTN  ·	Alb/Atrovent neb treat PRN  ·	Cont Symbicort   ·	Cont Prednisone 40 mg po daily  ·	Structural heart disease eval for severe MR noted. Scheduled for PATRICIA to eval the valve for possible MV clipping  ·	CXR reviewed. Unremarkable  ·	Venous doppler of LE is negative for DVT    Progress Note Handoff    Pending (specify):  Consults________, Tests________, Test Results_______, Other__TEE today_______  Family discussion:  Disposition: Home___/SNF___/Other________/Unknown at this time________    Wilmer Poole MD  Spectra: [phone]
a/p:  83-year-old female with history of asthma, vertigo presents for SOB. At baseline, patient uses a walker for ambulation    #SOB- acute hypoxic respiratory failure--multifactorial  #Acute HF- severe MR-   #chronic  Asthma- not in acute exacerbation  -continue telemetry  -iv lasix 40 mg q12 hr---monitor i/o, daily weights, fluid restriction  -titrate to oral lasix over next 24-48 hrs depending on fluid status  -BNP 1k  -serial trop negative  -RVP and covid negative  -continue tele monitoring (if no arrythemia over next 24 hrs can dc tele)  -check repeat CXR in am  -Awaiting structural cardiology consult for severe MR   -cardiology following---daughter says she wants to f/u with Dr. Person after dc   -f/u with pulmonary as outaptient (not currently in acute exacerbation--monitor peak flow)  -ECHO from outpatient from 2/2023 shows severe MR    #constipation  -bowel regimen--can add lactulose prn     #Progress Note Handoff  Pending (specify): iv lasix diuresis--f/u with cardiology   Family discussion: d/w crow herself as well as daughter (on phone) and son bedside   Disposition: Home_x__(remains acute at this time)     Total time spent to complete patient's bedside assessment, review medical chart, discuss medical plan of care with covering medical team was more than 50 minutes  with >50% of time spendt face to face with patient, discussion with patient/family and/or coordination of care
83-year-old female with history of asthma, vertigo presents for SOB. At baseline, patient uses a walker for ambulation    #acute hypoxic respiratory failure-multifactorial - resolving   #severe MR  #chronic  Asthma- not in exacerbation  -on lasix and IV solumedrol - change to oral lasix and po prednisone in am  -change diet to fluid restriction   - sa02 on RA with ambulation today to be checked  -await cardio( Structural) follow up for severe MR  for mitral clip.     Spoke with daughter and  patient may go home in AM after cardio consult
83-year-old female with history of asthma, vertigo presents for SOB. At baseline, patient uses a walker for ambulation    #acute hypoxic respiratory failure-multifactorial - resolving   #severe MR  #chronic  Asthma- not in exacerbation  -on lasix and IV solumedrol - change to oral lasix and po prednisone today  -change diet to fluid restriction   -check sa02 on ra with ambulation today  -await cardio follow up for severe MR     Pending: await cardio follow up for severe MR 
83-year-old female with history of asthma, vertigo presents for SOB. At baseline, patient uses a walker for ambulation    Asthma exacerbation  Severe MR  Severe pulmonary HTN  No CHF              PLAN:    ·	ECHO reviewed. EF is 70%. Severe MR. Severe pulmonary HTN  ·	S/P PATRICIA on 3/15. Severe MR. Discussed the findings with the cardiologist Dr. Rivera. Mitral clipping is not the option. Needs MVR. Recommended out pt f/u with hime  ·	Alb/Atrovent neb treat PRN  ·	Cont Symbicort   ·	Prednisone 20 mg po daily for three more days on d/c  ·	CXR reviewed. Unremarkable  ·	Venous doppler of LE is negative for DVT    * Med rec reviewed. Plan of care d/w the daughter on telephone and son on bedside. Made cardiologist appt with Dr. Rothman on 3/22 at 1 PM and will also make appt with Structural heart disease specialist Dr. Rivera prior to d/c today. Home services arranged. Time spent 45 minutes.

## 2023-03-15 NOTE — PROGRESS NOTE ADULT - PROVIDER SPECIALTY LIST ADULT
Hospitalist
Internal Medicine
Internal Medicine
Hospitalist
Internal Medicine
Internal Medicine
Hospitalist

## 2023-03-16 ENCOUNTER — TRANSCRIPTION ENCOUNTER (OUTPATIENT)
Age: 83
End: 2023-03-16

## 2023-03-17 ENCOUNTER — TRANSCRIPTION ENCOUNTER (OUTPATIENT)
Age: 83
End: 2023-03-17

## 2023-03-20 ENCOUNTER — APPOINTMENT (OUTPATIENT)
Dept: CARDIOLOGY | Facility: CLINIC | Age: 83
End: 2023-03-20
Payer: MEDICARE

## 2023-03-20 ENCOUNTER — RESULT CHARGE (OUTPATIENT)
Age: 83
End: 2023-03-20

## 2023-03-20 ENCOUNTER — INPATIENT (INPATIENT)
Facility: HOSPITAL | Age: 83
LOS: 3 days | Discharge: HOME HEALTH | DRG: 202 | End: 2023-03-24
Attending: STUDENT IN AN ORGANIZED HEALTH CARE EDUCATION/TRAINING PROGRAM | Admitting: STUDENT IN AN ORGANIZED HEALTH CARE EDUCATION/TRAINING PROGRAM
Payer: MEDICARE

## 2023-03-20 VITALS
HEART RATE: 98 BPM | DIASTOLIC BLOOD PRESSURE: 71 MMHG | SYSTOLIC BLOOD PRESSURE: 154 MMHG | HEIGHT: 59 IN | TEMPERATURE: 97.8 F

## 2023-03-20 VITALS
SYSTOLIC BLOOD PRESSURE: 129 MMHG | TEMPERATURE: 99 F | DIASTOLIC BLOOD PRESSURE: 60 MMHG | RESPIRATION RATE: 18 BRPM | HEART RATE: 90 BPM | WEIGHT: 160.06 LBS | OXYGEN SATURATION: 96 %

## 2023-03-20 DIAGNOSIS — J18.9 PNEUMONIA, UNSPECIFIED ORGANISM: ICD-10-CM

## 2023-03-20 DIAGNOSIS — I10 ESSENTIAL (PRIMARY) HYPERTENSION: ICD-10-CM

## 2023-03-20 DIAGNOSIS — I34.0 NONRHEUMATIC MITRAL (VALVE) INSUFFICIENCY: ICD-10-CM

## 2023-03-20 LAB
ALBUMIN SERPL ELPH-MCNC: 3.7 G/DL — SIGNIFICANT CHANGE UP (ref 3.5–5.2)
ALP SERPL-CCNC: 78 U/L — SIGNIFICANT CHANGE UP (ref 30–115)
ALT FLD-CCNC: 19 U/L — SIGNIFICANT CHANGE UP (ref 0–41)
ANION GAP SERPL CALC-SCNC: 13 MMOL/L — SIGNIFICANT CHANGE UP (ref 7–14)
APPEARANCE UR: CLEAR — SIGNIFICANT CHANGE UP
AST SERPL-CCNC: 19 U/L — SIGNIFICANT CHANGE UP (ref 0–41)
BACTERIA # UR AUTO: NEGATIVE — SIGNIFICANT CHANGE UP
BASOPHILS # BLD AUTO: 0.04 K/UL — SIGNIFICANT CHANGE UP (ref 0–0.2)
BASOPHILS NFR BLD AUTO: 0.2 % — SIGNIFICANT CHANGE UP (ref 0–1)
BILIRUB SERPL-MCNC: 0.3 MG/DL — SIGNIFICANT CHANGE UP (ref 0.2–1.2)
BILIRUB UR-MCNC: NEGATIVE — SIGNIFICANT CHANGE UP
BUN SERPL-MCNC: 26 MG/DL — HIGH (ref 10–20)
CALCIUM SERPL-MCNC: 9 MG/DL — SIGNIFICANT CHANGE UP (ref 8.4–10.4)
CHLORIDE SERPL-SCNC: 98 MMOL/L — SIGNIFICANT CHANGE UP (ref 98–110)
CO2 SERPL-SCNC: 22 MMOL/L — SIGNIFICANT CHANGE UP (ref 17–32)
COLOR SPEC: SIGNIFICANT CHANGE UP
CREAT SERPL-MCNC: 0.8 MG/DL — SIGNIFICANT CHANGE UP (ref 0.7–1.5)
DIFF PNL FLD: ABNORMAL
EGFR: 72 ML/MIN/1.73M2 — SIGNIFICANT CHANGE UP
EOSINOPHIL # BLD AUTO: 0.07 K/UL — SIGNIFICANT CHANGE UP (ref 0–0.7)
EOSINOPHIL NFR BLD AUTO: 0.4 % — SIGNIFICANT CHANGE UP (ref 0–8)
EPI CELLS # UR: 1 /HPF — SIGNIFICANT CHANGE UP (ref 0–5)
GLUCOSE SERPL-MCNC: 120 MG/DL — HIGH (ref 70–99)
GLUCOSE UR QL: NEGATIVE — SIGNIFICANT CHANGE UP
HCT VFR BLD CALC: 37.1 % — SIGNIFICANT CHANGE UP (ref 37–47)
HGB BLD-MCNC: 12.6 G/DL — SIGNIFICANT CHANGE UP (ref 12–16)
HYALINE CASTS # UR AUTO: 0 /LPF — SIGNIFICANT CHANGE UP (ref 0–7)
IMM GRANULOCYTES NFR BLD AUTO: 0.5 % — HIGH (ref 0.1–0.3)
KETONES UR-MCNC: NEGATIVE — SIGNIFICANT CHANGE UP
LEUKOCYTE ESTERASE UR-ACNC: NEGATIVE — SIGNIFICANT CHANGE UP
LYMPHOCYTES # BLD AUTO: 1.83 K/UL — SIGNIFICANT CHANGE UP (ref 1.2–3.4)
LYMPHOCYTES # BLD AUTO: 9.6 % — LOW (ref 20.5–51.1)
MAGNESIUM SERPL-MCNC: 1.9 MG/DL — SIGNIFICANT CHANGE UP (ref 1.8–2.4)
MCHC RBC-ENTMCNC: 31 PG — SIGNIFICANT CHANGE UP (ref 27–31)
MCHC RBC-ENTMCNC: 34 G/DL — SIGNIFICANT CHANGE UP (ref 32–37)
MCV RBC AUTO: 91.4 FL — SIGNIFICANT CHANGE UP (ref 81–99)
MONOCYTES # BLD AUTO: 1.16 K/UL — HIGH (ref 0.1–0.6)
MONOCYTES NFR BLD AUTO: 6.1 % — SIGNIFICANT CHANGE UP (ref 1.7–9.3)
NEUTROPHILS # BLD AUTO: 15.83 K/UL — HIGH (ref 1.4–6.5)
NEUTROPHILS NFR BLD AUTO: 83.2 % — HIGH (ref 42.2–75.2)
NITRITE UR-MCNC: NEGATIVE — SIGNIFICANT CHANGE UP
NRBC # BLD: 0 /100 WBCS — SIGNIFICANT CHANGE UP (ref 0–0)
NT-PROBNP SERPL-SCNC: 631 PG/ML — HIGH (ref 0–300)
PH UR: 6.5 — SIGNIFICANT CHANGE UP (ref 5–8)
PLATELET # BLD AUTO: 189 K/UL — SIGNIFICANT CHANGE UP (ref 130–400)
POTASSIUM SERPL-MCNC: 4.6 MMOL/L — SIGNIFICANT CHANGE UP (ref 3.5–5)
POTASSIUM SERPL-SCNC: 4.6 MMOL/L — SIGNIFICANT CHANGE UP (ref 3.5–5)
PROT SERPL-MCNC: 6.4 G/DL — SIGNIFICANT CHANGE UP (ref 6–8)
PROT UR-MCNC: NEGATIVE — SIGNIFICANT CHANGE UP
RBC # BLD: 4.06 M/UL — LOW (ref 4.2–5.4)
RBC # FLD: 13.5 % — SIGNIFICANT CHANGE UP (ref 11.5–14.5)
RBC CASTS # UR COMP ASSIST: 15 /HPF — HIGH (ref 0–4)
SODIUM SERPL-SCNC: 133 MMOL/L — LOW (ref 135–146)
SP GR SPEC: 1.02 — SIGNIFICANT CHANGE UP (ref 1.01–1.03)
TROPONIN T SERPL-MCNC: <0.01 NG/ML — SIGNIFICANT CHANGE UP
UROBILINOGEN FLD QL: SIGNIFICANT CHANGE UP
WBC # BLD: 19.03 K/UL — HIGH (ref 4.8–10.8)
WBC # FLD AUTO: 19.03 K/UL — HIGH (ref 4.8–10.8)
WBC UR QL: 1 /HPF — SIGNIFICANT CHANGE UP (ref 0–5)

## 2023-03-20 PROCEDURE — 74177 CT ABD & PELVIS W/CONTRAST: CPT | Mod: 26,MA

## 2023-03-20 PROCEDURE — 99214 OFFICE O/P EST MOD 30 MIN: CPT

## 2023-03-20 PROCEDURE — 94640 AIRWAY INHALATION TREATMENT: CPT

## 2023-03-20 PROCEDURE — 84100 ASSAY OF PHOSPHORUS: CPT

## 2023-03-20 PROCEDURE — 80053 COMPREHEN METABOLIC PANEL: CPT

## 2023-03-20 PROCEDURE — 83036 HEMOGLOBIN GLYCOSYLATED A1C: CPT

## 2023-03-20 PROCEDURE — 83735 ASSAY OF MAGNESIUM: CPT

## 2023-03-20 PROCEDURE — 0241U: CPT

## 2023-03-20 PROCEDURE — 80048 BASIC METABOLIC PNL TOTAL CA: CPT

## 2023-03-20 PROCEDURE — 0225U NFCT DS DNA&RNA 21 SARSCOV2: CPT

## 2023-03-20 PROCEDURE — 99285 EMERGENCY DEPT VISIT HI MDM: CPT | Mod: FS

## 2023-03-20 PROCEDURE — 71046 X-RAY EXAM CHEST 2 VIEWS: CPT | Mod: 26

## 2023-03-20 PROCEDURE — 85610 PROTHROMBIN TIME: CPT

## 2023-03-20 PROCEDURE — 85730 THROMBOPLASTIN TIME PARTIAL: CPT

## 2023-03-20 PROCEDURE — 36415 COLL VENOUS BLD VENIPUNCTURE: CPT

## 2023-03-20 PROCEDURE — 80061 LIPID PANEL: CPT

## 2023-03-20 PROCEDURE — 87040 BLOOD CULTURE FOR BACTERIA: CPT

## 2023-03-20 PROCEDURE — 85025 COMPLETE CBC W/AUTO DIFF WBC: CPT

## 2023-03-20 PROCEDURE — 93010 ELECTROCARDIOGRAM REPORT: CPT

## 2023-03-20 PROCEDURE — 84145 PROCALCITONIN (PCT): CPT

## 2023-03-20 PROCEDURE — 93005 ELECTROCARDIOGRAM TRACING: CPT

## 2023-03-20 PROCEDURE — 97162 PT EVAL MOD COMPLEX 30 MIN: CPT | Mod: GP

## 2023-03-20 PROCEDURE — 93000 ELECTROCARDIOGRAM COMPLETE: CPT

## 2023-03-20 RX ORDER — CEFEPIME 1 G/1
2000 INJECTION, POWDER, FOR SOLUTION INTRAMUSCULAR; INTRAVENOUS ONCE
Refills: 0 | Status: COMPLETED | OUTPATIENT
Start: 2023-03-20 | End: 2023-03-20

## 2023-03-20 RX ORDER — ACETAMINOPHEN 500 MG
650 TABLET ORAL ONCE
Refills: 0 | Status: COMPLETED | OUTPATIENT
Start: 2023-03-20 | End: 2023-03-20

## 2023-03-20 RX ORDER — SODIUM CHLORIDE 9 MG/ML
500 INJECTION, SOLUTION INTRAVENOUS
Refills: 0 | Status: DISCONTINUED | OUTPATIENT
Start: 2023-03-20 | End: 2023-03-21

## 2023-03-20 RX ADMIN — Medication 650 MILLIGRAM(S): at 22:19

## 2023-03-20 NOTE — ED ADULT TRIAGE NOTE - CHIEF COMPLAINT QUOTE
pt sent in for fatigue. a/ox4 in triage gt=680. was discharged from hospital 5 days ago for chf. saw cardio today who told her to come back in if still fatigued
No complaints

## 2023-03-20 NOTE — ED PROVIDER NOTE - PROGRESS NOTE DETAILS
EP: Patient feels warm to the touch, oral temperature was checked by me now and is 99.9F.  A dose of Tylenol was ordered.  WBC is elevated at 19,000 UA is pending, will obtain CT abdomen pelvis for the complaint of lower abdominal pain which patient reported only now. EP: CT scan is negative for acute pathology in the abdomen, however suspicious for left lower lobe infiltrate.  Given fever, elevated white cell count and lack of any other source, dose of antibiotic to cover pneumonia was ordered.  Patient's family uncomfortable taking her home at present, will admit.

## 2023-03-20 NOTE — ED PROVIDER NOTE - CLINICAL SUMMARY MEDICAL DECISION MAKING FREE TEXT BOX
83-year-old female with several comorbidities presenting for generalized weakness and decreased appetite for 2 days.  Patient low-grade temp in the ED, chest x-ray was obtained and appears negative for acute pathology.  Labs were ordered and reviewed showed white count of 19,000.  CT abdomen pelvis was obtained and suspicious for left lower lobe pneumonia.  Dose of antibiotics given, patient was given antipyretics, reported feeling somewhat better, admitted

## 2023-03-20 NOTE — ED PROVIDER NOTE - OBJECTIVE STATEMENT
83-year-old female with past medical history of Mitral regurgitation,  CHF,  asthma and hypertension presents to ED with weakness and lethargy for 1 day.  Patient had recent hospital admission on 3/15 for CHF and shortness of breath.  Patient denies shortness of breath, cough, chest pain, abdominal pain or nausea vomiting diarrhea.

## 2023-03-20 NOTE — ED PROVIDER NOTE - CONSIDERATION OF ADMISSION OBSERVATION
Elderly patient with fever, elevated white cell count, generalized weakness, admit Consideration of Admission/Observation

## 2023-03-20 NOTE — ED PROVIDER NOTE - ATTENDING APP SHARED VISIT CONTRIBUTION OF CARE
83-year-old female PMH pulmonary hypertension, asthma, CHF, recently diagnosed with severe mitral regurgitation presented for evaluation of generalized weakness, lethargy for a day.  Patient was discharged from the hospital on 3/15 after admission for CHF/shortness of breath.  Today she had a follow-up appointment with her cardiologist, reportedly had normal vital signs in the office, but was less talkative and active which prompted her family to bring her to ED for evaluation. There has been no vomiting or diarrhea, no foul-smelling urine, no skin rash, no headache no sore throat or any other acute complaints.  Elderly female resting in stretcher, appears to be sleeping, easily arousable, PERRL, pink conjunctiva, MMM, speaking full sentences, no wheezing or rhonchi, RRR, well-perfused extremities, abdomen soft without rebound or guarding, mild lower abdominal ttp, skin is warm to the touch, patient follows directions appropriately.  Plan: We will check labs including UA, chest x-ray, reassess.  Patient and family are amenable to plan.

## 2023-03-21 DIAGNOSIS — I27.20 PULMONARY HYPERTENSION, UNSPECIFIED: ICD-10-CM

## 2023-03-21 DIAGNOSIS — R42 DIZZINESS AND GIDDINESS: ICD-10-CM

## 2023-03-21 DIAGNOSIS — J45.909 UNSPECIFIED ASTHMA, UNCOMPLICATED: ICD-10-CM

## 2023-03-21 DIAGNOSIS — Z20.822 CONTACT WITH AND (SUSPECTED) EXPOSURE TO COVID-19: ICD-10-CM

## 2023-03-21 DIAGNOSIS — J96.01 ACUTE RESPIRATORY FAILURE WITH HYPOXIA: ICD-10-CM

## 2023-03-21 DIAGNOSIS — R21 RASH AND OTHER NONSPECIFIC SKIN ERUPTION: ICD-10-CM

## 2023-03-21 DIAGNOSIS — I42.9 CARDIOMYOPATHY, UNSPECIFIED: ICD-10-CM

## 2023-03-21 DIAGNOSIS — Z88.5 ALLERGY STATUS TO NARCOTIC AGENT: ICD-10-CM

## 2023-03-21 DIAGNOSIS — K59.00 CONSTIPATION, UNSPECIFIED: ICD-10-CM

## 2023-03-21 DIAGNOSIS — Z79.51 LONG TERM (CURRENT) USE OF INHALED STEROIDS: ICD-10-CM

## 2023-03-21 DIAGNOSIS — I50.31 ACUTE DIASTOLIC (CONGESTIVE) HEART FAILURE: ICD-10-CM

## 2023-03-21 DIAGNOSIS — I34.0 NONRHEUMATIC MITRAL (VALVE) INSUFFICIENCY: ICD-10-CM

## 2023-03-21 DIAGNOSIS — Z87.891 PERSONAL HISTORY OF NICOTINE DEPENDENCE: ICD-10-CM

## 2023-03-21 LAB
A1C WITH ESTIMATED AVERAGE GLUCOSE RESULT: 6 % — HIGH (ref 4–5.6)
ALBUMIN SERPL ELPH-MCNC: 3.3 G/DL — LOW (ref 3.5–5.2)
ALP SERPL-CCNC: 62 U/L — SIGNIFICANT CHANGE UP (ref 30–115)
ALT FLD-CCNC: 15 U/L — SIGNIFICANT CHANGE UP (ref 0–41)
ANION GAP SERPL CALC-SCNC: 11 MMOL/L — SIGNIFICANT CHANGE UP (ref 7–14)
APTT BLD: 30.2 SEC — SIGNIFICANT CHANGE UP (ref 27–39.2)
AST SERPL-CCNC: 12 U/L — SIGNIFICANT CHANGE UP (ref 0–41)
BASOPHILS # BLD AUTO: 0.04 K/UL — SIGNIFICANT CHANGE UP (ref 0–0.2)
BASOPHILS NFR BLD AUTO: 0.3 % — SIGNIFICANT CHANGE UP (ref 0–1)
BILIRUB SERPL-MCNC: 0.7 MG/DL — SIGNIFICANT CHANGE UP (ref 0.2–1.2)
BUN SERPL-MCNC: 17 MG/DL — SIGNIFICANT CHANGE UP (ref 10–20)
CALCIUM SERPL-MCNC: 8.3 MG/DL — LOW (ref 8.4–10.4)
CHLORIDE SERPL-SCNC: 103 MMOL/L — SIGNIFICANT CHANGE UP (ref 98–110)
CHOLEST SERPL-MCNC: 146 MG/DL — SIGNIFICANT CHANGE UP
CO2 SERPL-SCNC: 23 MMOL/L — SIGNIFICANT CHANGE UP (ref 17–32)
CREAT SERPL-MCNC: 0.7 MG/DL — SIGNIFICANT CHANGE UP (ref 0.7–1.5)
CULTURE RESULTS: SIGNIFICANT CHANGE UP
EGFR: 85 ML/MIN/1.73M2 — SIGNIFICANT CHANGE UP
EOSINOPHIL # BLD AUTO: 0.21 K/UL — SIGNIFICANT CHANGE UP (ref 0–0.7)
EOSINOPHIL NFR BLD AUTO: 1.7 % — SIGNIFICANT CHANGE UP (ref 0–8)
ESTIMATED AVERAGE GLUCOSE: 126 MG/DL — HIGH (ref 68–114)
FLUAV AG NPH QL: SIGNIFICANT CHANGE UP
FLUBV AG NPH QL: SIGNIFICANT CHANGE UP
GLUCOSE SERPL-MCNC: 84 MG/DL — SIGNIFICANT CHANGE UP (ref 70–99)
HCT VFR BLD CALC: 33.4 % — LOW (ref 37–47)
HDLC SERPL-MCNC: 54 MG/DL — SIGNIFICANT CHANGE UP
HGB BLD-MCNC: 11.4 G/DL — LOW (ref 12–16)
IMM GRANULOCYTES NFR BLD AUTO: 0.4 % — HIGH (ref 0.1–0.3)
INR BLD: 1.26 RATIO — SIGNIFICANT CHANGE UP (ref 0.65–1.3)
LIPID PNL WITH DIRECT LDL SERPL: 79 MG/DL — SIGNIFICANT CHANGE UP
LYMPHOCYTES # BLD AUTO: 17.7 % — LOW (ref 20.5–51.1)
LYMPHOCYTES # BLD AUTO: 2.19 K/UL — SIGNIFICANT CHANGE UP (ref 1.2–3.4)
MAGNESIUM SERPL-MCNC: 2 MG/DL — SIGNIFICANT CHANGE UP (ref 1.8–2.4)
MCHC RBC-ENTMCNC: 31.8 PG — HIGH (ref 27–31)
MCHC RBC-ENTMCNC: 34.1 G/DL — SIGNIFICANT CHANGE UP (ref 32–37)
MCV RBC AUTO: 93.3 FL — SIGNIFICANT CHANGE UP (ref 81–99)
MONOCYTES # BLD AUTO: 0.86 K/UL — HIGH (ref 0.1–0.6)
MONOCYTES NFR BLD AUTO: 7 % — SIGNIFICANT CHANGE UP (ref 1.7–9.3)
NEUTROPHILS # BLD AUTO: 8.99 K/UL — HIGH (ref 1.4–6.5)
NEUTROPHILS NFR BLD AUTO: 72.9 % — SIGNIFICANT CHANGE UP (ref 42.2–75.2)
NON HDL CHOLESTEROL: 92 MG/DL — SIGNIFICANT CHANGE UP
NRBC # BLD: 0 /100 WBCS — SIGNIFICANT CHANGE UP (ref 0–0)
PHOSPHATE SERPL-MCNC: 3.7 MG/DL — SIGNIFICANT CHANGE UP (ref 2.1–4.9)
PLATELET # BLD AUTO: 160 K/UL — SIGNIFICANT CHANGE UP (ref 130–400)
POTASSIUM SERPL-MCNC: 4 MMOL/L — SIGNIFICANT CHANGE UP (ref 3.5–5)
POTASSIUM SERPL-SCNC: 4 MMOL/L — SIGNIFICANT CHANGE UP (ref 3.5–5)
PROCALCITONIN SERPL-MCNC: 0.11 NG/ML — HIGH (ref 0.02–0.1)
PROT SERPL-MCNC: 5.6 G/DL — LOW (ref 6–8)
PROTHROM AB SERPL-ACNC: 14.5 SEC — HIGH (ref 9.95–12.87)
RBC # BLD: 3.58 M/UL — LOW (ref 4.2–5.4)
RBC # FLD: 13.5 % — SIGNIFICANT CHANGE UP (ref 11.5–14.5)
RSV RNA NPH QL NAA+NON-PROBE: SIGNIFICANT CHANGE UP
SARS-COV-2 RNA SPEC QL NAA+PROBE: SIGNIFICANT CHANGE UP
SODIUM SERPL-SCNC: 137 MMOL/L — SIGNIFICANT CHANGE UP (ref 135–146)
SPECIMEN SOURCE: SIGNIFICANT CHANGE UP
TRIGL SERPL-MCNC: 64 MG/DL — SIGNIFICANT CHANGE UP
WBC # BLD: 12.34 K/UL — HIGH (ref 4.8–10.8)
WBC # FLD AUTO: 12.34 K/UL — HIGH (ref 4.8–10.8)

## 2023-03-21 PROCEDURE — 99223 1ST HOSP IP/OBS HIGH 75: CPT

## 2023-03-21 RX ORDER — CEFEPIME 1 G/1
2000 INJECTION, POWDER, FOR SOLUTION INTRAMUSCULAR; INTRAVENOUS EVERY 12 HOURS
Refills: 0 | Status: DISCONTINUED | OUTPATIENT
Start: 2023-03-21 | End: 2023-03-22

## 2023-03-21 RX ORDER — ALBUTEROL 90 UG/1
2 AEROSOL, METERED ORAL
Qty: 0 | Refills: 0 | DISCHARGE

## 2023-03-21 RX ORDER — FLUTICASONE FUROATE AND VILANTEROL TRIFENATATE 100; 25 UG/1; UG/1
1 POWDER RESPIRATORY (INHALATION)
Qty: 0 | Refills: 0 | DISCHARGE

## 2023-03-21 RX ORDER — ENOXAPARIN SODIUM 100 MG/ML
40 INJECTION SUBCUTANEOUS EVERY 24 HOURS
Refills: 0 | Status: DISCONTINUED | OUTPATIENT
Start: 2023-03-21 | End: 2023-03-24

## 2023-03-21 RX ORDER — ALBUTEROL 90 UG/1
2 AEROSOL, METERED ORAL EVERY 6 HOURS
Refills: 0 | Status: DISCONTINUED | OUTPATIENT
Start: 2023-03-21 | End: 2023-03-24

## 2023-03-21 RX ORDER — ONDANSETRON 8 MG/1
4 TABLET, FILM COATED ORAL EVERY 8 HOURS
Refills: 0 | Status: DISCONTINUED | OUTPATIENT
Start: 2023-03-21 | End: 2023-03-24

## 2023-03-21 RX ORDER — BUDESONIDE AND FORMOTEROL FUMARATE DIHYDRATE 160; 4.5 UG/1; UG/1
2 AEROSOL RESPIRATORY (INHALATION)
Refills: 0 | Status: DISCONTINUED | OUTPATIENT
Start: 2023-03-21 | End: 2023-03-24

## 2023-03-21 RX ORDER — SENNA PLUS 8.6 MG/1
2 TABLET ORAL AT BEDTIME
Refills: 0 | Status: DISCONTINUED | OUTPATIENT
Start: 2023-03-21 | End: 2023-03-24

## 2023-03-21 RX ORDER — MECLIZINE HCL 12.5 MG
1 TABLET ORAL
Qty: 0 | Refills: 0 | DISCHARGE

## 2023-03-21 RX ORDER — MECLIZINE HCL 12.5 MG
25 TABLET ORAL THREE TIMES A DAY
Refills: 0 | Status: DISCONTINUED | OUTPATIENT
Start: 2023-03-21 | End: 2023-03-24

## 2023-03-21 RX ORDER — LANOLIN ALCOHOL/MO/W.PET/CERES
3 CREAM (GRAM) TOPICAL AT BEDTIME
Refills: 0 | Status: DISCONTINUED | OUTPATIENT
Start: 2023-03-21 | End: 2023-03-24

## 2023-03-21 RX ORDER — ACETAMINOPHEN 500 MG
650 TABLET ORAL EVERY 6 HOURS
Refills: 0 | Status: DISCONTINUED | OUTPATIENT
Start: 2023-03-21 | End: 2023-03-24

## 2023-03-21 RX ORDER — FUROSEMIDE 40 MG
40 TABLET ORAL DAILY
Refills: 0 | Status: DISCONTINUED | OUTPATIENT
Start: 2023-03-21 | End: 2023-03-24

## 2023-03-21 RX ORDER — POLYETHYLENE GLYCOL 3350 17 G/17G
17 POWDER, FOR SOLUTION ORAL DAILY
Refills: 0 | Status: DISCONTINUED | OUTPATIENT
Start: 2023-03-21 | End: 2023-03-24

## 2023-03-21 RX ADMIN — Medication 3 MILLIGRAM(S): at 21:39

## 2023-03-21 RX ADMIN — BUDESONIDE AND FORMOTEROL FUMARATE DIHYDRATE 2 PUFF(S): 160; 4.5 AEROSOL RESPIRATORY (INHALATION) at 11:33

## 2023-03-21 RX ADMIN — BUDESONIDE AND FORMOTEROL FUMARATE DIHYDRATE 2 PUFF(S): 160; 4.5 AEROSOL RESPIRATORY (INHALATION) at 22:10

## 2023-03-21 RX ADMIN — CEFEPIME 100 MILLIGRAM(S): 1 INJECTION, POWDER, FOR SOLUTION INTRAMUSCULAR; INTRAVENOUS at 00:15

## 2023-03-21 RX ADMIN — POLYETHYLENE GLYCOL 3350 17 GRAM(S): 17 POWDER, FOR SOLUTION ORAL at 18:33

## 2023-03-21 RX ADMIN — CEFEPIME 100 MILLIGRAM(S): 1 INJECTION, POWDER, FOR SOLUTION INTRAMUSCULAR; INTRAVENOUS at 18:34

## 2023-03-21 RX ADMIN — Medication 40 MILLIGRAM(S): at 07:19

## 2023-03-21 RX ADMIN — ENOXAPARIN SODIUM 40 MILLIGRAM(S): 100 INJECTION SUBCUTANEOUS at 18:34

## 2023-03-21 RX ADMIN — SODIUM CHLORIDE 100 MILLILITER(S): 9 INJECTION, SOLUTION INTRAVENOUS at 00:15

## 2023-03-21 RX ADMIN — CEFEPIME 100 MILLIGRAM(S): 1 INJECTION, POWDER, FOR SOLUTION INTRAMUSCULAR; INTRAVENOUS at 07:19

## 2023-03-21 NOTE — ED ADULT NURSE NOTE - CHIEF COMPLAINT QUOTE
pt sent in for fatigue. a/ox4 in triage qo=908. was discharged from hospital 5 days ago for chf. saw cardio today who told her to come back in if still fatigued

## 2023-03-21 NOTE — H&P ADULT - NSHPPHYSICALEXAM_GEN_ALL_CORE
VITAL SIGNS: AFebrile, vital signs stable  CONSTITUTIONAL: Well-developed; well-nourished; in no acute distress.  SKIN: Skin exam is warm and dry, no acute rash.  HEAD: Normocephalic; atraumatic.  EYES: Pupils equal round reactive to light, Extraocular movements intact; conjunctiva and sclera clear.  ENT: No nasal discharge; airway clear. Moist mucus membranes.  NECK: Supple; non tender. No rigidity  CARD: Regular rate and rhythm. Normal S1, S2; no murmurs, gallops, or rubs.  RESP: Lungs clear to auscultation bilaterally except for left basal fine crackles. No wheezes.  ABD: Abdomen soft; non-tender; non-distended;  No costovertebral angle tenderness.   EXT: Normal ROM. No clubbing, cyanosis or edema. No calf tenderness to palpation.  NEURO: Alert and oriented x 3. No focal deficits.  PSYCH: Cooperative, appropriate.

## 2023-03-21 NOTE — PATIENT PROFILE ADULT - FALL HARM RISK - HARM RISK INTERVENTIONS

## 2023-03-21 NOTE — PHYSICAL THERAPY INITIAL EVALUATION ADULT - PERTINENT HX OF CURRENT PROBLEM, REHAB EVAL
83-year-old female w/ Mod-Sev MR, Sev PHTN, HTN, coronary calcifications, Asthma recently hospitalized for symptomatic MR presenting for weakness, lethargy, decreased activity, & decreased PO intake of 2 days duration. No fever, chills, N/V. Temp in ED was 99.9. In ED was complaining of RLQ abdominal pain that resolved by the time I saw her.

## 2023-03-21 NOTE — H&P ADULT - HISTORY OF PRESENT ILLNESS
83-year-old female w/ Mod-Sev MR, Sev PHTN, HTN, coronary calcifications, Asthma recently hospitalized for symptomatic MR presenting for weakness, lethargy, decreased activity, & decreased PO intake of 2 days duration. No fever, chills, N/V. Temp in ED was 99.9. In ED was complaining of RLQ abdominal pain that resolved by the time I saw her.    WBC 19, PMN 83 (was on steroids)  Pro-  Trop < 0.01  CT abdomen left lung base patchy opacity  UA -ve    s/p cefepime, levofloxacin   ml/hr x 5 hrs (I stopped it)    PMHx Vertigo  PSHx Kyphoplasty    In ED, VS: T 37.1, /60, HR 90, RR 18, SpO2 96% on RA

## 2023-03-21 NOTE — PATIENT PROFILE ADULT - DO YOU LACK THE NECESSARY SUPPORT TO HELP YOU COPE WITH LIFE CHALLENGES?
Psych rehab staff met with patient in order to discuss progress towards psych rehab goal upon discharge. Pt has made fair progress, as patient has identified improved sleep as benefit of medication compliance. Writer provided support and encouraged patient to explore additional benefit such as less sowmya post discharge. Pt was minimally receptive.    On the unit, patient was visible, interacting with peers. at times, patient requires redirection due to sowmya and poor boundaries. Patient reports intent to follow up with treatment, however insight into illness remains limited despite Psychoeducation from writer. Patient denies SI and is able to contract for safety. Writer and patient completed safety plan.     Patient has attended approximately 80% of psych rehab groups. Patient was unable to tolerate session structure, however patient was verbally engaged. Pt denied press ganey survey. 
no

## 2023-03-21 NOTE — H&P ADULT - ATTENDING COMMENTS
83-year-old female w/ Mod-Sev MR, Sev PHTN, HTN, coronary calcifications, Asthma recently hospitalized for symptomatic MR presenting for weakness, lethargy, decreased activity, & decreased PO intake of 2 days duration.     #Weakness  #Suspected PNA  # Hx of asthma  Recently treated for asthma exacerbation and HFpEF due to severe MR  Leukocytosis noted, but patient has been on steroids  CT A/P showing patchy opacity at left lung base, atelectasis vs infection  Suspicion for pneumonia is low, but will rule out  Patient's weakness could be attributed to being tapered and weaned off PO prednisone 2 days ago  - Cont IV cefepime 2g q12h  - PT Eval  - f/u procalcitonin, BCx  - f/u ID eval    #Severe MR  No signs of fluid overload on this admission  - Will need MV replacement  - Outpatient f/u with structural cardiology  - Cont PO lasix 40mg qD  - Pt had cardio appt with Dr. Person tomorrow, will consult    # 5 mm PD dilation  - outpatient MRCP    # Vertigo  - c/w Meclizine PRN     DVT PPX, lovenox    #Progress Note Handoff  Pending (specify): ID eval, procal, Bcx, cont IV abx  Family discussion: dw pt regarding eval for pna  Disposition: Home

## 2023-03-21 NOTE — H&P ADULT - ASSESSMENT
83-year-old female w/ Mod-Sev MR, Sev PHTN, HTN, coronary calcifications, Asthma recently hospitalized for symptomatic MR presenting for weakness, lethargy, decreased activity, & decreased PO intake of 2 days duration. No fever, chills, N/V. Temp in ED was 99.9. In ED was complaining of RLQ abdominal pain that resolved by the time I saw her.    # HAP  # Asthma  WBC 19, PMN 83 (was on steroids)  Pro-  Trop < 0.01  CT abdomen left lung base patchy opacity  UA -ve  s/p cefepime, levofloxacin   ml/hr x 5 hrs (I stopped it)  - c/w albuterol, Symbicort  - c/w Cefepime  - MRSA swab, Procal, RVP  - f/u BCx  - SCx if requiring NIV  - c/s RD    # Mod-Sev MR  # Sev PHTN  # HTN  # coronary calcifications  - not candidate for mitraclip, needs to do MVR  - c/w Lasix    # 5 mm PD dilation  - outpatient MRCP    # Vertigo  - c/w Meclizine PRN (consider D/C if worried about AMS)    # Diet DASH, TLC

## 2023-03-21 NOTE — PHYSICAL THERAPY INITIAL EVALUATION ADULT - GENERAL OBSERVATIONS, REHAB EVAL
2:23-2:55 pt encountered in bed, daughter present.  pt had good tolerance for bed mobility, transfers and ambulated with RW.  Daughter reprots pt is at baseline function for mobility.  There is no skilled need for PT at this time

## 2023-03-22 ENCOUNTER — TRANSCRIPTION ENCOUNTER (OUTPATIENT)
Age: 83
End: 2023-03-22

## 2023-03-22 LAB
ANION GAP SERPL CALC-SCNC: 10 MMOL/L — SIGNIFICANT CHANGE UP (ref 7–14)
BASOPHILS # BLD AUTO: 0.04 K/UL — SIGNIFICANT CHANGE UP (ref 0–0.2)
BASOPHILS NFR BLD AUTO: 0.5 % — SIGNIFICANT CHANGE UP (ref 0–1)
BUN SERPL-MCNC: 18 MG/DL — SIGNIFICANT CHANGE UP (ref 10–20)
CALCIUM SERPL-MCNC: 9.1 MG/DL — SIGNIFICANT CHANGE UP (ref 8.4–10.5)
CHLORIDE SERPL-SCNC: 103 MMOL/L — SIGNIFICANT CHANGE UP (ref 98–110)
CO2 SERPL-SCNC: 26 MMOL/L — SIGNIFICANT CHANGE UP (ref 17–32)
CREAT SERPL-MCNC: 0.9 MG/DL — SIGNIFICANT CHANGE UP (ref 0.7–1.5)
EGFR: 63 ML/MIN/1.73M2 — SIGNIFICANT CHANGE UP
EOSINOPHIL # BLD AUTO: 0.39 K/UL — SIGNIFICANT CHANGE UP (ref 0–0.7)
EOSINOPHIL NFR BLD AUTO: 4.6 % — SIGNIFICANT CHANGE UP (ref 0–8)
GLUCOSE SERPL-MCNC: 107 MG/DL — HIGH (ref 70–99)
HCT VFR BLD CALC: 38.4 % — SIGNIFICANT CHANGE UP (ref 37–47)
HGB BLD-MCNC: 12.8 G/DL — SIGNIFICANT CHANGE UP (ref 12–16)
IMM GRANULOCYTES NFR BLD AUTO: 0.4 % — HIGH (ref 0.1–0.3)
LYMPHOCYTES # BLD AUTO: 1.6 K/UL — SIGNIFICANT CHANGE UP (ref 1.2–3.4)
LYMPHOCYTES # BLD AUTO: 18.8 % — LOW (ref 20.5–51.1)
MCHC RBC-ENTMCNC: 30.7 PG — SIGNIFICANT CHANGE UP (ref 27–31)
MCHC RBC-ENTMCNC: 33.3 G/DL — SIGNIFICANT CHANGE UP (ref 32–37)
MCV RBC AUTO: 92.1 FL — SIGNIFICANT CHANGE UP (ref 81–99)
MONOCYTES # BLD AUTO: 0.71 K/UL — HIGH (ref 0.1–0.6)
MONOCYTES NFR BLD AUTO: 8.3 % — SIGNIFICANT CHANGE UP (ref 1.7–9.3)
NEUTROPHILS # BLD AUTO: 5.75 K/UL — SIGNIFICANT CHANGE UP (ref 1.4–6.5)
NEUTROPHILS NFR BLD AUTO: 67.4 % — SIGNIFICANT CHANGE UP (ref 42.2–75.2)
NRBC # BLD: 0 /100 WBCS — SIGNIFICANT CHANGE UP (ref 0–0)
PLATELET # BLD AUTO: 197 K/UL — SIGNIFICANT CHANGE UP (ref 130–400)
POTASSIUM SERPL-MCNC: 4.1 MMOL/L — SIGNIFICANT CHANGE UP (ref 3.5–5)
POTASSIUM SERPL-SCNC: 4.1 MMOL/L — SIGNIFICANT CHANGE UP (ref 3.5–5)
RBC # BLD: 4.17 M/UL — LOW (ref 4.2–5.4)
RBC # FLD: 13.5 % — SIGNIFICANT CHANGE UP (ref 11.5–14.5)
SODIUM SERPL-SCNC: 139 MMOL/L — SIGNIFICANT CHANGE UP (ref 135–146)
WBC # BLD: 8.52 K/UL — SIGNIFICANT CHANGE UP (ref 4.8–10.8)
WBC # FLD AUTO: 8.52 K/UL — SIGNIFICANT CHANGE UP (ref 4.8–10.8)

## 2023-03-22 PROCEDURE — 99233 SBSQ HOSP IP/OBS HIGH 50: CPT

## 2023-03-22 RX ADMIN — POLYETHYLENE GLYCOL 3350 17 GRAM(S): 17 POWDER, FOR SOLUTION ORAL at 11:42

## 2023-03-22 RX ADMIN — Medication 25 MILLIGRAM(S): at 23:12

## 2023-03-22 RX ADMIN — Medication 100 MILLIGRAM(S): at 11:38

## 2023-03-22 RX ADMIN — ENOXAPARIN SODIUM 40 MILLIGRAM(S): 100 INJECTION SUBCUTANEOUS at 17:42

## 2023-03-22 RX ADMIN — CEFEPIME 100 MILLIGRAM(S): 1 INJECTION, POWDER, FOR SOLUTION INTRAMUSCULAR; INTRAVENOUS at 05:18

## 2023-03-22 RX ADMIN — Medication 100 MILLIGRAM(S): at 17:21

## 2023-03-22 RX ADMIN — Medication 3 MILLIGRAM(S): at 21:43

## 2023-03-22 RX ADMIN — Medication 40 MILLIGRAM(S): at 05:17

## 2023-03-22 NOTE — DISCHARGE NOTE NURSING/CASE MANAGEMENT/SOCIAL WORK - PATIENT PORTAL LINK FT
You can access the FollowMyHealth Patient Portal offered by [facility] by registering at the following website: [email or web address]. By joining [employer]’s FollowMyHealth portal, you will also be able to view your health information using other applications (apps) compatible with our system.

## 2023-03-22 NOTE — DISCHARGE NOTE PROVIDER - NSDCFUSCHEDAPPT_GEN_ALL_CORE_FT
Princess Hennessy Physician [facility]  [facility] [address]  Scheduled Appointment: 04/18/2023

## 2023-03-22 NOTE — DISCHARGE NOTE PROVIDER - NSDCCPCAREPLAN_GEN_ALL_CORE_FT
PRINCIPAL DISCHARGE DIAGNOSIS  Diagnosis: Bronchitis, acute  Assessment and Plan of Treatment: Please take your medications as directed. Don’t skip doses. Follow up with your primary care physician within 3 days. Continue taking your antibiotics as directed until they are all gone—even if you start to feel better. This will prevent the bronchitis from  Coughing up mucus is normal. Don’t use medicines to suppress your cough unless your cough is dry, painful, or interferes with your sleep. Get plenty of rest until your fever, shortness of breath, and chest pain go away. Plan to get a flu shot every year.   Seek immediate medical attention if you experience chest pain, trouble breathing, blue lips or fingernails, fever of 100.4°F  (38°C) or higher, yellow, green, bloody, or smelly sputum, more than normal mucus production, vomiting or diarrhea.        SECONDARY DISCHARGE DIAGNOSES  Diagnosis: Weakness  Assessment and Plan of Treatment:      PRINCIPAL DISCHARGE DIAGNOSIS  Diagnosis: Bronchitis, acute  Assessment and Plan of Treatment: Please take your medications as directed. Don’t skip doses. Follow up with your primary care physician within 3 days. Continue taking your antibiotics as directed until they are all gone—even if you start to feel better. This will prevent the bronchitis from  Coughing up mucus is normal. Don’t use medicines to suppress your cough unless your cough is dry, painful, or interferes with your sleep. Get plenty of rest until your fever, shortness of breath, and chest pain go away. Plan to get a flu shot every year.   Seek immediate medical attention if you experience chest pain, trouble breathing, blue lips or fingernails, fever of 100.4°F  (38°C) or higher, yellow, green, bloody, or smelly sputum, more than normal mucus production, vomiting or diarrhea.        SECONDARY DISCHARGE DIAGNOSES  Diagnosis: Weakness  Assessment and Plan of Treatment:     Diagnosis: Dilated pancreatic duct  Assessment and Plan of Treatment: Pancreatic duct dilatation measuring 5 mm in the head of the pancreas.  Outpatient pancreatic MRI with MRCP and gadolinium is necessary for   further evaluation.  Follow up with Primary care physician for montoring anf MRI as discussed.

## 2023-03-22 NOTE — DISCHARGE NOTE PROVIDER - NSDCFUADDAPPT_GEN_ALL_CORE_FT
APPTS ARE READY TO BE MADE: [X ] YES    Best Family or Patient Contact (if needed):[phone]    (Daughter- Ashleigh)    Additional Information about above appointments (if needed):    1: cardiology- Fer Duenas  2:   3:     Other comments or requests:    APPTS ARE READY TO BE MADE: [X ] YES    Best Family or Patient Contact (if needed):[phone]    (Daughter- Ashleigh)    Additional Information about above appointments (if needed):    1: cardiology- Fer Duenas within 1 week of discharge  2:   3:     Other comments or requests:    APPTS ARE READY TO BE MADE: [X ] YES    Best Family or Patient Contact (if needed):[phone]    (Daughter- Ashleigh)    Additional Information about above appointments (if needed):    1: cardiology- Fer Duenas within 1 week of discharge  2:   3:     Other comments or requests:     Patient was provided with Dr. Monroe and Dr. Webster and was advised to call to schedule follow up within specified time frame. At this time patient declined scheduling assistance.

## 2023-03-22 NOTE — DISCHARGE NOTE PROVIDER - HOSPITAL COURSE
Pt is a 83 year old female, past medical history of moderate-severe mitral valve regurgitation, severe pulmonary hypertension, Asthma, Hypertension, CHF (EF 55-60% on most recent echo), and Vertigo, recently hospitalized on 3/15 for SOB, CHF Exacerbation, presenting to the ED on 3/20 for 1 day history of weakness and lethargy.     In the ED, Pt was found to have an elevated WBC of 19,000, Chest x-ray was negative for pneumonia, however CT scan was notable for left lower infiltrate vs atelectasis. Pt was admitted and started on cefepime, levofloxacin for empiric treatment of hospital acquired pneumonia.     In the ED, initial vitals on presentation were as follows: Temp 37.1C, HR 90, Bp 129/60, RR 18, SPO2 96% on room air.     Following admission, Pt's respiratory panel was negative, Procal was negative, BNP was at baseline, Urinalysis was negative. Pt was seen by Infectious Disease, who recommended PO Doxycycline 100mg q12.    Pt is a 83 year old female, past medical history of moderate-severe mitral valve regurgitation, severe pulmonary hypertension, Asthma, Hypertension, CHF (EF 55-60% on most recent echo), and Vertigo, recently hospitalized on 3/15 for SOB, CHF Exacerbation, presenting to the ED on 3/20 for 1 day history of weakness and lethargy.     In the ED, Pt was found to have an elevated WBC of 19,000, Chest x-ray was negative for pneumonia, however CT scan was notable for left lower infiltrate vs atelectasis. Pt was admitted and started on cefepime, levofloxacin for empiric treatment of hospital acquired pneumonia.     In the ED, initial vitals on presentation were as follows: Temp 37.1C, HR 90, Bp 129/60, RR 18, SPO2 96% on room air.     Following admission, Pt's respiratory panel was negative, Procal was negative, BNP was at baseline, Urinalysis was negative. Pt was seen by Infectious Disease, who recommended PO Doxycycline 100mg q12.     Diagnosis:    # acute bronchitis  #  Left lower lobe pneumonia- ruled out  CT showing -pneumonia vs atelectasis; ID recommendation reviewed  -patient treated with cefepime and levofloxacin; antibiotics changed to doxy which patient did not tolerate.   patient given azithromycin which she tolerated; plan for total of 5 days    # Asthma- stable    # Mod-Sev MR  # Sev PHTN  # HTN  # coronary calcifications  - follow up with cardiology Dr bhatti/ Dr Monroe as OP  - c/w Lasix    # 5 mm PD dilation  - outpatient MRCP  - discussed with family; follow up as OP    # Vertigo

## 2023-03-22 NOTE — DISCHARGE NOTE PROVIDER - PROVIDER TOKENS
PROVIDER:[TOKEN:[[id number]],FOLLOWUP:[1 week]],PROVIDER:[TOKEN:[[id number]],FOLLOWUP:[1-3 days]]

## 2023-03-22 NOTE — DISCHARGE NOTE NURSING/CASE MANAGEMENT/SOCIAL WORK - NSDCPEFALRISK_GEN_ALL_CORE
For information on Fall & Injury Prevention, visit: [email or web address] OR  [email or web address] OR  https://www.cdc.gov/steadi/patient.html

## 2023-03-22 NOTE — DISCHARGE NOTE PROVIDER - NSDCMRMEDTOKEN_GEN_ALL_CORE_FT
Albuterol (Eqv-ProAir HFA) 90 mcg/inh inhalation aerosol: 2 puff(s) inhaled every 6 hours, As Needed  Breo Ellipta 100 mcg-25 mcg/inh inhalation powder: 1 puff(s) inhaled once a day  furosemide 40 mg oral tablet: 1 tab(s) orally once a day  meclizine 25 mg oral tablet: 1 tab(s) orally 3 times a day, As Needed   acetaminophen 325 mg oral tablet: 2 tab(s) orally every 6 hours, As needed, Temp greater or equal to 38C (100.4F), Mild Pain (1 - 3)  Albuterol (Eqv-ProAir HFA) 90 mcg/inh inhalation aerosol: 2 puff(s) inhaled every 6 hours, As Needed  azithromycin 500 mg oral tablet: 1 tab(s) orally once a day   Breo Ellipta 100 mcg-25 mcg/inh inhalation powder: 1 puff(s) inhaled once a day  furosemide 40 mg oral tablet: 1 tab(s) orally once a day  meclizine 25 mg oral tablet: 1 tab(s) orally 3 times a day, As Needed

## 2023-03-22 NOTE — CONSULT NOTE ADULT - SUBJECTIVE AND OBJECTIVE BOX
VIANCA NEVAREZ  83y, Female  Allergy: codeine (Vomiting; Headache; Nausea; Drowsiness)  No Known Allergies      All historical available data reviewed.    HPI:  83-year-old female w/ Mod-Sev MR, Sev PHTN, HTN, coronary calcifications, Asthma recently hospitalized for symptomatic MR presenting for weakness, lethargy, decreased activity, & decreased PO intake of 2 days duration. No fever, chills, N/V. Temp in ED was 99.9. In ED was complaining of RLQ abdominal pain that resolved by the time I saw her.    WBC 19, PMN 83 (was on steroids)  Pro-  Trop < 0.01  CT abdomen left lung base patchy opacity  UA -ve    s/p cefepime, levofloxacin   ml/hr x 5 hrs (I stopped it)    PMHx Vertigo  PSHx Kyphoplasty    In ED, VS: T 37.1, /60, HR 90, RR 18, SpO2 96% on RA (21 Mar 2023 04:37)  ID called for possible PNA    FAMILY HISTORY:    PAST MEDICAL & SURGICAL HISTORY:  Asthma            VITALS:  T(F): 97.8, Max: 98 (23 @ 15:57)  HR: 71  BP: 114/53  RR: 18Vital Signs Last 24 Hrs  T(C): 36.6 (22 Mar 2023 04:25), Max: 36.7 (21 Mar 2023 15:57)  T(F): 97.8 (22 Mar 2023 04:25), Max: 98 (21 Mar 2023 15:57)  HR: 71 (22 Mar 2023 04:25) (66 - 75)  BP: 114/53 (22 Mar 2023 04:25) (114/53 - 134/63)  BP(mean): 84 (21 Mar 2023 15:57) (84 - 84)  RR: 18 (22 Mar 2023 04:25) (18 - 18)  SpO2: 97% (21 Mar 2023 15:57) (97% - 97%)        TESTS & MEASUREMENTS:                        12.8   8.52  )-----------( 197      ( 22 Mar 2023 08:38 )             38.4         139  |  103  |  18  ----------------------------<  107<H>  4.1   |  26  |  0.9    Ca    9.1      22 Mar 2023 08:38  Phos  3.7     -  Mg     2.0     -    TPro  5.6<L>  /  Alb  3.3<L>  /  TBili  0.7  /  DBili  x   /  AST  12  /  ALT  15  /  AlkPhos  62  -    LIVER FUNCTIONS - ( 21 Mar 2023 06:37 )  Alb: 3.3 g/dL / Pro: 5.6 g/dL / ALK PHOS: 62 U/L / ALT: 15 U/L / AST: 12 U/L / GGT: x             Culture - Urine (collected 23 @ 22:34)  Source: Clean Catch Clean Catch (Midstream)  Final Report (23 @ 21:58):    <10,000 CFU/mL Normal Urogenital Marlene      Urinalysis Basic - ( 20 Mar 2023 22:34 )    Color: Light Yellow / Appearance: Clear / S.018 / pH: x  Gluc: x / Ketone: Negative  / Bili: Negative / Urobili: <2 mg/dL   Blood: x / Protein: Negative / Nitrite: Negative   Leuk Esterase: Negative / RBC: 15 /HPF / WBC 1 /HPF   Sq Epi: x / Non Sq Epi: 1 /HPF / Bacteria: Negative          RADIOLOGY & ADDITIONAL TESTS:  Personal review of radiological diagnostics performed  Echo and EKG results noted when applicable.     MEDICATIONS:  acetaminophen     Tablet .. 650 milliGRAM(s) Oral every 6 hours PRN  albuterol    90 MICROgram(s) HFA Inhaler 2 Puff(s) Inhalation every 6 hours PRN  aluminum hydroxide/magnesium hydroxide/simethicone Suspension 30 milliLiter(s) Oral every 4 hours PRN  budesonide  80 MICROgram(s)/formoterol 4.5 MICROgram(s) Inhaler 2 Puff(s) Inhalation two times a day  cefepime   IVPB 2000 milliGRAM(s) IV Intermittent every 12 hours  enoxaparin Injectable 40 milliGRAM(s) SubCutaneous every 24 hours  furosemide    Tablet 40 milliGRAM(s) Oral daily  meclizine 25 milliGRAM(s) Oral three times a day PRN  melatonin 3 milliGRAM(s) Oral at bedtime PRN  ondansetron Injectable 4 milliGRAM(s) IV Push every 8 hours PRN  polyethylene glycol 3350 17 Gram(s) Oral daily  senna 2 Tablet(s) Oral at bedtime PRN      ANTIBIOTICS:  cefepime   IVPB 2000 milliGRAM(s) IV Intermittent every 12 hours

## 2023-03-22 NOTE — DISCHARGE NOTE NURSING/CASE MANAGEMENT/SOCIAL WORK - NSDCFUADDAPPT_GEN_ALL_CORE_FT
APPTS ARE READY TO BE MADE: [X ] YES    Best Family or Patient Contact (if needed):[phone]    (Daughter- Ashleigh)    Additional Information about above appointments (if needed):    1: cardiology- Fer Duenas within 1 week of discharge  2:   3:     Other comments or requests:

## 2023-03-22 NOTE — CONSULT NOTE ADULT - ASSESSMENT
83-year-old female w/ Mod-Sev MR, Sev PHTN, HTN, coronary calcifications, Asthma recently hospitalized for symptomatic MR presenting for weakness, lethargy, decreased activity, & decreased PO intake of 2 days duration. No fever, chills, N/V.   Temp in ED was 99.9.     IMPRESSION/RECOMMENDATIONS  Bronchitis  No evidence of a bacterial PNA  3/20 CT no overt consolidation  Procal 0.11  WBC 19>12.3  COVID 19/ Influenza/ RSV NG.   No pyuria  -po Doxycycline 100 mg q12h for 5 days    Please do not hesitate to recall ID if any questions arise either through [facility] or through [employer]

## 2023-03-22 NOTE — DISCHARGE NOTE PROVIDER - CARE PROVIDER_API CALL
Janette BIRMINGHAM (MD)  Cardiology; Internal Medicine  [address] [place] [address]  Phone: [phone]  Fax: [phone]  Follow Up Time: 1 week    Adán Webster  Internal Medicine  N  Tay WEEKS,    Phone: ()-  Fax: ()-  Follow Up Time: 1-3 days

## 2023-03-23 LAB
ALBUMIN SERPL ELPH-MCNC: 3.6 G/DL — SIGNIFICANT CHANGE UP (ref 3.5–5.2)
ALP SERPL-CCNC: 64 U/L — SIGNIFICANT CHANGE UP (ref 30–115)
ALT FLD-CCNC: 13 U/L — SIGNIFICANT CHANGE UP (ref 0–41)
ANION GAP SERPL CALC-SCNC: 12 MMOL/L — SIGNIFICANT CHANGE UP (ref 7–14)
AST SERPL-CCNC: 16 U/L — SIGNIFICANT CHANGE UP (ref 0–41)
BASOPHILS # BLD AUTO: 0.05 K/UL — SIGNIFICANT CHANGE UP (ref 0–0.2)
BASOPHILS NFR BLD AUTO: 0.6 % — SIGNIFICANT CHANGE UP (ref 0–1)
BILIRUB SERPL-MCNC: 0.4 MG/DL — SIGNIFICANT CHANGE UP (ref 0.2–1.2)
BUN SERPL-MCNC: 27 MG/DL — HIGH (ref 10–20)
CALCIUM SERPL-MCNC: 8.8 MG/DL — SIGNIFICANT CHANGE UP (ref 8.4–10.4)
CHLORIDE SERPL-SCNC: 101 MMOL/L — SIGNIFICANT CHANGE UP (ref 98–110)
CO2 SERPL-SCNC: 21 MMOL/L — SIGNIFICANT CHANGE UP (ref 17–32)
CREAT SERPL-MCNC: 0.9 MG/DL — SIGNIFICANT CHANGE UP (ref 0.7–1.5)
EGFR: 63 ML/MIN/1.73M2 — SIGNIFICANT CHANGE UP
EOSINOPHIL # BLD AUTO: 0.1 K/UL — SIGNIFICANT CHANGE UP (ref 0–0.7)
EOSINOPHIL NFR BLD AUTO: 1.3 % — SIGNIFICANT CHANGE UP (ref 0–8)
GLUCOSE SERPL-MCNC: 169 MG/DL — HIGH (ref 70–99)
HCT VFR BLD CALC: 35 % — LOW (ref 37–47)
HGB BLD-MCNC: 11.8 G/DL — LOW (ref 12–16)
IMM GRANULOCYTES NFR BLD AUTO: 0.3 % — SIGNIFICANT CHANGE UP (ref 0.1–0.3)
LYMPHOCYTES # BLD AUTO: 1.1 K/UL — LOW (ref 1.2–3.4)
LYMPHOCYTES # BLD AUTO: 14.2 % — LOW (ref 20.5–51.1)
MAGNESIUM SERPL-MCNC: 2 MG/DL — SIGNIFICANT CHANGE UP (ref 1.8–2.4)
MCHC RBC-ENTMCNC: 31.1 PG — HIGH (ref 27–31)
MCHC RBC-ENTMCNC: 33.7 G/DL — SIGNIFICANT CHANGE UP (ref 32–37)
MCV RBC AUTO: 92.1 FL — SIGNIFICANT CHANGE UP (ref 81–99)
MONOCYTES # BLD AUTO: 0.5 K/UL — SIGNIFICANT CHANGE UP (ref 0.1–0.6)
MONOCYTES NFR BLD AUTO: 6.5 % — SIGNIFICANT CHANGE UP (ref 1.7–9.3)
NEUTROPHILS # BLD AUTO: 5.96 K/UL — SIGNIFICANT CHANGE UP (ref 1.4–6.5)
NEUTROPHILS NFR BLD AUTO: 77.1 % — HIGH (ref 42.2–75.2)
NRBC # BLD: 0 /100 WBCS — SIGNIFICANT CHANGE UP (ref 0–0)
PLATELET # BLD AUTO: 182 K/UL — SIGNIFICANT CHANGE UP (ref 130–400)
POTASSIUM SERPL-MCNC: 4.1 MMOL/L — SIGNIFICANT CHANGE UP (ref 3.5–5)
POTASSIUM SERPL-SCNC: 4.1 MMOL/L — SIGNIFICANT CHANGE UP (ref 3.5–5)
PROT SERPL-MCNC: 6.4 G/DL — SIGNIFICANT CHANGE UP (ref 6–8)
RBC # BLD: 3.8 M/UL — LOW (ref 4.2–5.4)
RBC # FLD: 13.3 % — SIGNIFICANT CHANGE UP (ref 11.5–14.5)
SODIUM SERPL-SCNC: 134 MMOL/L — LOW (ref 135–146)
WBC # BLD: 7.73 K/UL — SIGNIFICANT CHANGE UP (ref 4.8–10.8)
WBC # FLD AUTO: 7.73 K/UL — SIGNIFICANT CHANGE UP (ref 4.8–10.8)

## 2023-03-23 PROCEDURE — 99232 SBSQ HOSP IP/OBS MODERATE 35: CPT

## 2023-03-23 PROCEDURE — 93010 ELECTROCARDIOGRAM REPORT: CPT

## 2023-03-23 RX ORDER — AZITHROMYCIN 500 MG/1
500 TABLET, FILM COATED ORAL DAILY
Refills: 0 | Status: DISCONTINUED | OUTPATIENT
Start: 2023-03-24 | End: 2023-03-24

## 2023-03-23 RX ORDER — AZITHROMYCIN 500 MG/1
500 TABLET, FILM COATED ORAL ONCE
Refills: 0 | Status: COMPLETED | OUTPATIENT
Start: 2023-03-23 | End: 2023-03-23

## 2023-03-23 RX ORDER — AZITHROMYCIN 500 MG/1
TABLET, FILM COATED ORAL
Refills: 0 | Status: DISCONTINUED | OUTPATIENT
Start: 2023-03-23 | End: 2023-03-23

## 2023-03-23 RX ADMIN — Medication 40 MILLIGRAM(S): at 05:23

## 2023-03-23 RX ADMIN — AZITHROMYCIN 500 MILLIGRAM(S): 500 TABLET, FILM COATED ORAL at 11:03

## 2023-03-23 RX ADMIN — POLYETHYLENE GLYCOL 3350 17 GRAM(S): 17 POWDER, FOR SOLUTION ORAL at 12:00

## 2023-03-23 RX ADMIN — Medication 650 MILLIGRAM(S): at 03:01

## 2023-03-23 RX ADMIN — Medication 650 MILLIGRAM(S): at 02:55

## 2023-03-23 RX ADMIN — BUDESONIDE AND FORMOTEROL FUMARATE DIHYDRATE 2 PUFF(S): 160; 4.5 AEROSOL RESPIRATORY (INHALATION) at 21:14

## 2023-03-23 RX ADMIN — Medication 100 MILLIGRAM(S): at 05:23

## 2023-03-23 RX ADMIN — ENOXAPARIN SODIUM 40 MILLIGRAM(S): 100 INJECTION SUBCUTANEOUS at 18:00

## 2023-03-23 RX ADMIN — ONDANSETRON 4 MILLIGRAM(S): 8 TABLET, FILM COATED ORAL at 08:32

## 2023-03-23 NOTE — DIETITIAN INITIAL EVALUATION ADULT - PERTINENT LABORATORY DATA
03-23    134<L>  |  101  |  27<H>  ----------------------------<  169<H>  4.1   |  21  |  0.9    Ca    8.8      23 Mar 2023 08:56  Mg     2.0     03-23    TPro  6.4  /  Alb  3.6  /  TBili  0.4  /  DBili  x   /  AST  16  /  ALT  13  /  AlkPhos  64  03-23  A1C with Estimated Average Glucose Result: 6.0 % (03-21-23 @ 06:37)  A1C with Estimated Average Glucose Result: 5.8 % (03-09-23 @ 05:28)

## 2023-03-23 NOTE — DIETITIAN INITIAL EVALUATION ADULT - NS FNS CHANGE IN WEIGHT
[employer] Emergency Department Provider Note                   PCP:                Patricia Hernandez MD               Age: 74 y.o. Sex: male       ICD-10-CM    1. Postoperative infection, unspecified type, initial encounter  T81.40XA       2. Leg pain, bilateral  M79.604 Vascular duplex lower extremity venous bilateral    M79.605 Vascular duplex lower extremity venous bilateral          DISPOSITION    Admit. MDM  75-year-old male in the ED with abdominal pain, distention, redness to the area surrounding recent umbilical hernia surgery site. Also reports scrotal swelling. Triage bilateral knee pain, states he has no pain while laying in the exam room bed has no pain with range of motion and no pain with palpation, but pain when ambulating and is uncertain why. Denies injury, fall. No lower extremity swelling, no pitting edema and he has an unremarkable exam of the lower extremities. Suspect/abscess of abdominal wall and surgical site. HPI as charted. Pt neck is supple, no meningeal signs. Lungs are clear to auscultation, no diminished sounds. Abdomen is soft and not tender. Obtained labs, lactic and blood cultures though on initial evaluation, low clinical suspicion of sepsis, at this time (1010). Labs reviewed largely reassuring. White count is elevated, is not tachycardic or febrile. No elevated lactate or lipase. CT scan was obtained and there is findings consistent with intra-abdominal abscess/infection, findings as below. Discussed with ED attending who reviewed and collaborated plan of care. Patient was given IV antibiotics promptly, fluids. I have consulted general surgery team and they are evaluating at bedside. Surgery requests hospitalist admission for management due to the patient's bilateral knee pain. Discuss further with surgery PA reports due to patient's lengthy medical history they would like medicine admit with surgery consulting.   I discussed patient with hospitalist service who have agreed the patient, thank you. Orders Placed This Encounter   Procedures    Culture, Blood 1    CBC with Auto Differential    CMP    Lipase    Lactic Acid    POCT Urine Dipstick        Dominguez Blanca is a 74 y.o. male who presents to the Emergency Department with chief complaint of    Chief Complaint   Patient presents with    Post-op Problem    Knee Pain      HPI  Pleasant 64-year-old male presents to the emergency department with his wife for evaluation of abdominal pain and distention, redness and firmness surrounding recent hernia repair site, scrotal swelling, pain and erythema along left inguinal crease, nausea with no vomiting. Patient states that symptoms began approximately 2-3 days ago and are increasing in severity. Nothing known to make worse or better. States he had hernia repair performed by Dr. Lashaun Owens approximately 2 weeks ago. States he was in the emergency department over the weekend and was found to have a small bowel obstruction, admitted managed for this. States symptoms began soon after discharge. No known therapeutic measures, nothing noted make worse or better. Symptoms are constant. No urinary complaint, no blood in urine, no blood in stools. Secondary complaint of bilateral knee pain with ambulation since waking this morning. Denies fall or activity change. No pain distal to or proximal to the knees. Flecked range of motion intact without pain or limitation. No tenderness on palpation, no joint laxity, no erythema, no obvious effusion, intact distal pulses, bilaterally. Cap refill less than 2 seconds. No pallor, no calf swelling, no pitting edema or other abnormal finding. Denies fever/chills, change in appetite, weight loss, decreased oral intake, blurred vision, headaches, neck pain/stiffness. Alert & oriented x 3, afebrile, hemodynamically stable, non-toxic appearing, appears in no distress.    Medical/surgical/social history reviewed with the patient. Review of Systems  Constitutional: Negative for fever. Negative for appetite change, chills, diaphoresis and unexpected weight change. HENT: As in HPI     Eyes: Negative. Respiratory: As in HPI   Cardiovascular: Negative. GI/: As in HPI      Musculoskeletal: As in HPI   Skin: Negative. Allergic/Immunologic: Negative. Neurological: Negative. Past Medical History:   Diagnosis Date    Anemia     Anxiety     Asthma     childhood    BMI 33.0-33.9,adult     CAD (coronary artery disease)     CABG/Stents; denies MI; followed by [facility]    Calcification of abdominal aorta (HCC)     Cardiac arrhythmia     atrial fib    Chronic kidney disease     Dyslipidemia     Former smoker     GERD (gastroesophageal reflux disease)     Gout     HLD (hyperlipidemia)     Hypercholesterolemia     Hypertension     Insomnia     Liver disease     ?? growth on liver    Mild episode of recurrent major depressive disorder (HCC)     HAY (obstructive sleep apnea)     non-compliant with c-pap    Paroxysmal atrial fibrillation ([address]     Poor historian     Prostatism     PUD (peptic ulcer disease)     HX    Stroke ([address]     DENIES (noted 9/13/22)        Past Surgical History:   Procedure Laterality Date    CABG, ARTERIAL, THREE  4/13/11    x3    CARDIAC CATHETERIZATION  09/06/2011    CORONARY ANGIOPLASTY WITH STENT PLACEMENT  09/16/2019    patient thinks 4 total stents; last in 2019=Successful angioplasty and stenting of the vein graft to the RCA.     INGUINAL HERNIA REPAIR Bilateral 9/15/2022    LAPAROSCOPIC BILATERAL INGUINAL HERNIA REPAIR WITH MESH performed by Chris Chandler MD at [address] [place] [address] N/A 9/19/2022    LAPAROTOMY EXPLORATORY, REVISION OF MESH, LYSIS OF ADHESIONS performed by Chris Chandler MD at [address] [place] [address]      right shoulder, left knee, and left foot    SINUS SURGERY      TESTICLE SURGERY      UMBILICAL HERNIA REPAIR N/A 1/01/4964    HERNIA UMBILICAL REPAIR performed by Na Garcia MD at [address] [facility]          Family History   Problem Relation Age of Onset    Alzheimer's Disease Mother     Stroke Father     Heart Disease Father         Social History     Socioeconomic History    Marital status:    Tobacco Use    Smoking status: Former     Packs/day: 1.50     Types: Cigarettes     Quit date: 1970     Years since quittin.4    Smokeless tobacco: Former     Quit date: 2018    Tobacco comments:     Quit smoking: chews tobacco   Vaping Use    Vaping Use: Never used   Substance and Sexual Activity    Alcohol use: Yes     Comment: occ    Drug use: No         Indomethacin, Atorvastatin, and Rosuvastatin     Previous Medications    ALIROCUMAB (PRALUENT) 75 MG/ML SOAJ INJECTION PEN    Inject 75 mg into the skin every 14 days    APPLE CIDER VINEGAR PO    Take by mouth    ASCORBIC ACID (VITAMIN C) 500 MG TABLET    Take 1,000 mg by mouth    ASPIRIN 81 MG EC TABLET    Take 81 mg by mouth in the morning. CLOPIDOGREL (PLAVIX) 75 MG TABLET    Take 75 mg by mouth in the morning. COENZYME Q10 10 MG CAPS    Take by mouth    CYANOCOBALAMIN 100 MCG TABLET    Take 400 mcg by mouth daily    DOCUSATE SODIUM (COLACE, DULCOLAX) 100 MG CAPS    Take 100 mg by mouth 2 times daily as needed for Constipation    GARLIC PO    Take by mouth    HAWTHORN BERRY PO    Take by mouth    LECITHIN PO    Take by mouth    LORAZEPAM (ATIVAN) 1 MG TABLET    Take 1 mg by mouth 2 times daily as needed. LOSARTAN (COZAAR) 100 MG TABLET    Take 100 mg by mouth in the morning. METOPROLOL SUCCINATE (TOPROL XL) 25 MG EXTENDED RELEASE TABLET    Take 25 mg by mouth in the morning.     MONTELUKAST (SINGULAIR) 10 MG TABLET    TAKE 1 TABLET BY MOUTH EVERY DAY    PAROXETINE (PAXIL) 20 MG TABLET    Take 30 mg by mouth every evening    POTASSIUM CHLORIDE (KLOR-CON M) 20 MEQ EXTENDED RELEASE TABLET    Take 20 mEq by mouth daily as needed    PROMETHAZINE (PHENERGAN) 12.5 MG Voice dictation software was used during the making of this note. This software is not perfect and grammatical and other typographical errors may be present. This note has not been completely proofread for errors.          JEANA Simental - RUPERT  09/28/22 0470 Loss

## 2023-03-23 NOTE — DIETITIAN INITIAL EVALUATION ADULT - ADD RECOMMEND
1) Continue current diet order - patient prefers oatmeal and banana at breakfast   2) Recommend Ensure Max 2x/day to optimize kcal and protein intake  3) Weekly weights  4) Encouragement and assistance with all meals, snacks, supplement     Patient is at high nutrition risk, RD to f/u in 3-5 days or PRN

## 2023-03-23 NOTE — DIETITIAN INITIAL EVALUATION ADULT - ORAL INTAKE PTA/DIET HISTORY
Nutrition hx obtained from daughter present at bedside. Patient usually eating well, but recently on antibiotics and patient is reporting metallic taste. Patient was taking B-12 and Calcium for supplementation. UBW: 128 lbs (~3 weeks ago). NKFA,  no intolerances reported.     Daughter reports patient was vomiting multiple times last night and currently does not have good appetite. Agreeable to have Ensure Max supplement

## 2023-03-23 NOTE — DIETITIAN INITIAL EVALUATION ADULT - EDUCATION DIETARY MODIFICATIONS
discussed seasoning foods without the use of salt; benefits of oral nutrition supplements/(1) partially meets; needs review/practice/verbalization

## 2023-03-23 NOTE — PROGRESS NOTE ADULT - ATTENDING COMMENTS
S: daughter present at bedside  started nausea and vomiting last night  received zofran  feeling little better today      PHYSICAL EXAM    GEN: no distress, comfortable  PULM: BS heard b/l equal, No wheezing  CVS: S1S2 present, no rubs or gallops  ABD: Soft, non-distended, no guarding; non-tender  EXT: No lower extremity edema  NEURO: A&Ox3, moving all extremities    A&P     83 year old female, past medical history of moderate-severe mitral valve regurgitation, severe pulmonary hypertension, Asthma, Hypertension, CHF (EF 55-60% on most recent echo), and Vertigo, recently hospitalized on 3/15 for SOB, CHF Exacerbation, presenting to the ED on 3/20 for 1 day history of weakness and lethargy.     # acute bronchitis  #  Left lower lobe pneumonia- less likely  CT showing -pneumonia vs atelectasis; ID recommendation reviewed  - change antibiotics to doxy for 5 days- patient with nausea and vomiting possibly related to antibiotics  cahnge to azithromycin; give IV today; if better can transition to PO tomorrow    # Asthma- stable  continue symbicort; takes trelegy at home    # Mod-Sev MR  # Sev PHTN  # HTN  # coronary calcifications  - follow up with cardiology Dr bhatti/ Dr ybarra as OP  - c/w Lasix    # 5 mm PD dilation  - outpatient MRCP    # Vertigo  - c/w Meclizine PRN (consider D/C if worried about AMS)    # Diet DASH, TLC      DV px- lovenox  Plan of care d/w daughter at bedside  Pending: improvement in nausea and vomiting, tansition to PO antibiotics S: daughter present at bedside  started nausea and vomiting last night  received zofran  feeling little better today      PHYSICAL EXAM    GEN: no distress, comfortable  PULM: BS heard b/l equal, No wheezing  CVS: S1S2 present, no rubs or gallops  ABD: Soft, non-distended, no guarding; non-tender  EXT: No lower extremity edema  NEURO: A&Ox3, moving all extremities    A&P     83 year old female, past medical history of moderate-severe mitral valve regurgitation, severe pulmonary hypertension, Asthma, Hypertension, CHF (EF 55-60% on most recent echo), and Vertigo, recently hospitalized on 3/15 for SOB, CHF Exacerbation, presenting to the ED on 3/20 for 1 day history of weakness and lethargy.     # acute bronchitis  #  Left lower lobe pneumonia- less likely  CT showing -pneumonia vs atelectasis; ID recommendation reviewed  - change antibiotics to doxy for 5 days- patient with nausea and vomiting possibly related to antibiotics  cahnge to azithromycin; give IV today; if better can transition to PO tomorrow    # Asthma- stable  continue symbicort; takes breo at home    # Mod-Sev MR  # Sev PHTN  # HTN  # coronary calcifications  - follow up with cardiology Dr bhatti/ Dr ybarra as OP  - c/w Lasix    # 5 mm PD dilation  - outpatient MRCP    # Vertigo  - c/w Meclizine PRN (consider D/C if worried about AMS)    # Diet DASH, TLC      DV px- lovenox  Plan of care d/w daughter at bedside  Pending: improvement in nausea and vomiting, tansition to PO antibiotics

## 2023-03-23 NOTE — DIETITIAN INITIAL EVALUATION ADULT - OTHER INFO
Patient is 82 yo female with PMHx of moderate-severe mitral valve regurgitation, severe pulmonary hypertension, Asthma, HTN, CHF and vertigo, recently hospitalized on 3/15 for SOB, CHF exacerbation, presenting to the ED on 3/20 for day 1 day history of weakness and lethargy. Patient is admitted for acute bronchitis - CT showing pneumonia vs atelectasis; 5 mm PD dilation - outpatient MRCP.

## 2023-03-23 NOTE — DIETITIAN INITIAL EVALUATION ADULT - PERTINENT MEDS FT
MEDICATIONS  (STANDING):  budesonide  80 MICROgram(s)/formoterol 4.5 MICROgram(s) Inhaler 2 Puff(s) Inhalation two times a day  enoxaparin Injectable 40 milliGRAM(s) SubCutaneous every 24 hours  furosemide    Tablet 40 milliGRAM(s) Oral daily  polyethylene glycol 3350 17 Gram(s) Oral daily    MEDICATIONS  (PRN):  acetaminophen     Tablet .. 650 milliGRAM(s) Oral every 6 hours PRN Temp greater or equal to 38C (100.4F), Mild Pain (1 - 3)  albuterol    90 MICROgram(s) HFA Inhaler 2 Puff(s) Inhalation every 6 hours PRN Shortness of Breath and/or Wheezing  aluminum hydroxide/magnesium hydroxide/simethicone Suspension 30 milliLiter(s) Oral every 4 hours PRN Dyspepsia  meclizine 25 milliGRAM(s) Oral three times a day PRN Dizziness  melatonin 3 milliGRAM(s) Oral at bedtime PRN Insomnia  ondansetron Injectable 4 milliGRAM(s) IV Push every 8 hours PRN Nausea and/or Vomiting  senna 2 Tablet(s) Oral at bedtime PRN Constipation

## 2023-03-23 NOTE — DIETITIAN INITIAL EVALUATION ADULT - OTHER CALCULATIONS
energy: 1035 - 1122 kcal/day (MSJ x 1.2 - 1.3 SF)   estimated needs with consideration for age, decreased PO intake

## 2023-03-24 VITALS
SYSTOLIC BLOOD PRESSURE: 163 MMHG | DIASTOLIC BLOOD PRESSURE: 72 MMHG | TEMPERATURE: 97 F | RESPIRATION RATE: 18 BRPM | HEART RATE: 75 BPM

## 2023-03-24 LAB
ALBUMIN SERPL ELPH-MCNC: 3.8 G/DL — SIGNIFICANT CHANGE UP (ref 3.5–5.2)
ALP SERPL-CCNC: 56 U/L — SIGNIFICANT CHANGE UP (ref 30–115)
ALT FLD-CCNC: 12 U/L — SIGNIFICANT CHANGE UP (ref 0–41)
ANION GAP SERPL CALC-SCNC: 13 MMOL/L — SIGNIFICANT CHANGE UP (ref 7–14)
AST SERPL-CCNC: 14 U/L — SIGNIFICANT CHANGE UP (ref 0–41)
BASOPHILS # BLD AUTO: 0.05 K/UL — SIGNIFICANT CHANGE UP (ref 0–0.2)
BASOPHILS NFR BLD AUTO: 0.7 % — SIGNIFICANT CHANGE UP (ref 0–1)
BILIRUB SERPL-MCNC: 0.3 MG/DL — SIGNIFICANT CHANGE UP (ref 0.2–1.2)
BUN SERPL-MCNC: 26 MG/DL — HIGH (ref 10–20)
CALCIUM SERPL-MCNC: 8.8 MG/DL — SIGNIFICANT CHANGE UP (ref 8.4–10.5)
CHLORIDE SERPL-SCNC: 96 MMOL/L — LOW (ref 98–110)
CO2 SERPL-SCNC: 25 MMOL/L — SIGNIFICANT CHANGE UP (ref 17–32)
CREAT SERPL-MCNC: 1 MG/DL — SIGNIFICANT CHANGE UP (ref 0.7–1.5)
EGFR: 55 ML/MIN/1.73M2 — LOW
EOSINOPHIL # BLD AUTO: 0.35 K/UL — SIGNIFICANT CHANGE UP (ref 0–0.7)
EOSINOPHIL NFR BLD AUTO: 5.2 % — SIGNIFICANT CHANGE UP (ref 0–8)
GLUCOSE SERPL-MCNC: 83 MG/DL — SIGNIFICANT CHANGE UP (ref 70–99)
HCT VFR BLD CALC: 34.5 % — LOW (ref 37–47)
HGB BLD-MCNC: 11.5 G/DL — LOW (ref 12–16)
IMM GRANULOCYTES NFR BLD AUTO: 0.3 % — SIGNIFICANT CHANGE UP (ref 0.1–0.3)
LYMPHOCYTES # BLD AUTO: 1.96 K/UL — SIGNIFICANT CHANGE UP (ref 1.2–3.4)
LYMPHOCYTES # BLD AUTO: 29 % — SIGNIFICANT CHANGE UP (ref 20.5–51.1)
MAGNESIUM SERPL-MCNC: 2.1 MG/DL — SIGNIFICANT CHANGE UP (ref 1.8–2.4)
MCHC RBC-ENTMCNC: 30.6 PG — SIGNIFICANT CHANGE UP (ref 27–31)
MCHC RBC-ENTMCNC: 33.3 G/DL — SIGNIFICANT CHANGE UP (ref 32–37)
MCV RBC AUTO: 91.8 FL — SIGNIFICANT CHANGE UP (ref 81–99)
MONOCYTES # BLD AUTO: 0.79 K/UL — HIGH (ref 0.1–0.6)
MONOCYTES NFR BLD AUTO: 11.7 % — HIGH (ref 1.7–9.3)
NEUTROPHILS # BLD AUTO: 3.58 K/UL — SIGNIFICANT CHANGE UP (ref 1.4–6.5)
NEUTROPHILS NFR BLD AUTO: 53.1 % — SIGNIFICANT CHANGE UP (ref 42.2–75.2)
NRBC # BLD: 0 /100 WBCS — SIGNIFICANT CHANGE UP (ref 0–0)
PLATELET # BLD AUTO: 190 K/UL — SIGNIFICANT CHANGE UP (ref 130–400)
POTASSIUM SERPL-MCNC: 4 MMOL/L — SIGNIFICANT CHANGE UP (ref 3.5–5)
POTASSIUM SERPL-SCNC: 4 MMOL/L — SIGNIFICANT CHANGE UP (ref 3.5–5)
PROT SERPL-MCNC: 6.5 G/DL — SIGNIFICANT CHANGE UP (ref 6–8)
RAPID RVP RESULT: SIGNIFICANT CHANGE UP
RBC # BLD: 3.76 M/UL — LOW (ref 4.2–5.4)
RBC # FLD: 13.3 % — SIGNIFICANT CHANGE UP (ref 11.5–14.5)
SARS-COV-2 RNA SPEC QL NAA+PROBE: SIGNIFICANT CHANGE UP
SODIUM SERPL-SCNC: 134 MMOL/L — LOW (ref 135–146)
WBC # BLD: 6.75 K/UL — SIGNIFICANT CHANGE UP (ref 4.8–10.8)
WBC # FLD AUTO: 6.75 K/UL — SIGNIFICANT CHANGE UP (ref 4.8–10.8)

## 2023-03-24 PROCEDURE — 99239 HOSP IP/OBS DSCHRG MGMT >30: CPT

## 2023-03-24 RX ORDER — ACETAMINOPHEN 500 MG
2 TABLET ORAL
Qty: 0 | Refills: 0 | DISCHARGE
Start: 2023-03-24

## 2023-03-24 RX ORDER — AZITHROMYCIN 500 MG/1
1 TABLET, FILM COATED ORAL
Qty: 3 | Refills: 0
Start: 2023-03-24 | End: 2023-03-26

## 2023-03-24 RX ADMIN — AZITHROMYCIN 500 MILLIGRAM(S): 500 TABLET, FILM COATED ORAL at 12:00

## 2023-03-24 RX ADMIN — POLYETHYLENE GLYCOL 3350 17 GRAM(S): 17 POWDER, FOR SOLUTION ORAL at 12:00

## 2023-03-24 RX ADMIN — Medication 40 MILLIGRAM(S): at 05:03

## 2023-03-24 NOTE — MEDICAL STUDENT PROGRESS NOTE(EDUCATION) - SUBJECTIVE AND OBJECTIVE BOX
VIANCA NEVAREZ  83y  Female    Subjective:  Patient is a 83y old female with pmhx of moderate-severe MR, Severe PHTN, HTN, Asthma who presents on 3/20 with a chief complaint of weakness, lethargy, treated for hospital acquired pneumonia over the course of this admission.       INTERVAL HPI/OVERNIGHT EVENTS: Pt resting comfortably in bed this AM. Reports significant improvement from yesterdays nausea and abdominal pain. Reports no further episodes of vomiting. Pt reports no complaints or overnight events.     Objective:  PAST MEDICAL & SURGICAL HISTORY:  Asthma    Vitals:  T(C): 36 (03-24-23 @ 04:01), Max: 37.2 (03-23-23 @ 14:40)  HR: 69 (03-24-23 @ 04:01) (69 - 78)  BP: 98/45 (03-24-23 @ 04:01) (98/45 - 122/56)  RR: 18 (03-24-23 @ 04:01) (18 - 18)  SpO2: --  Wt(kg): --Vital Signs Last 24 Hrs  T(C): 36 (24 Mar 2023 04:01), Max: 37.2 (23 Mar 2023 14:40)  T(F): 96.8 (24 Mar 2023 04:01), Max: 98.9 (23 Mar 2023 14:40)  HR: 69 (24 Mar 2023 04:01) (69 - 78)  BP: 98/45 (24 Mar 2023 04:01) (98/45 - 122/56)  BP(mean): --  RR: 18 (24 Mar 2023 04:01) (18 - 18)  SpO2: --    MEDICATIONS  (STANDING):  azithromycin   Tablet 500 milliGRAM(s) Oral daily  budesonide  80 MICROgram(s)/formoterol 4.5 MICROgram(s) Inhaler 2 Puff(s) Inhalation two times a day  enoxaparin Injectable 40 milliGRAM(s) SubCutaneous every 24 hours  furosemide    Tablet 40 milliGRAM(s) Oral daily  polyethylene glycol 3350 17 Gram(s) Oral daily    MEDICATIONS  (PRN):  acetaminophen     Tablet .. 650 milliGRAM(s) Oral every 6 hours PRN Temp greater or equal to 38C (100.4F), Mild Pain (1 - 3)  albuterol    90 MICROgram(s) HFA Inhaler 2 Puff(s) Inhalation every 6 hours PRN Shortness of Breath and/or Wheezing  aluminum hydroxide/magnesium hydroxide/simethicone Suspension 30 milliLiter(s) Oral every 4 hours PRN Dyspepsia  meclizine 25 milliGRAM(s) Oral three times a day PRN Dizziness  melatonin 3 milliGRAM(s) Oral at bedtime PRN Insomnia  ondansetron Injectable 4 milliGRAM(s) IV Push every 8 hours PRN Nausea and/or Vomiting  senna 2 Tablet(s) Oral at bedtime PRN Constipation    PHYSICAL EXAM:  GENERAL: NAD, well-groomed, well-developed  HEAD:  Atraumatic, Normocephalic  NERVOUS SYSTEM:  Alert & Oriented X3, Good concentration  CHEST/LUNG: Clear to percussion bilaterally; No rales, rhonchi, wheezing, or rubs  HEART: Regular rate and rhythm, 3/6 systolic murmur at cardiac apex  ABDOMEN: Soft, Nontender, Nondistended; Bowel sounds present    LABS:                   11.8   7.73  )-----------( 182      ( 23 Mar 2023 08:57 )             35.0   03-23  134<L>  |  101  |  27<H>  ----------------------------<  169<H>  4.1   |  21  |  0.9  Ca    8.8      23 Mar 2023 08:56  Mg     2.0     03-23  TPro  6.4  /  Alb  3.6  /  TBili  0.4  /  DBili  x   /  AST  16  /  ALT  13  /  AlkPhos  64  03-23      RADIOLOGY & ADDITIONAL TESTS:  ACC: [id number] EXAM:  CT ABDOMEN AND PELVIS IC   ORDERED BY: MELIA AREVALO   PROCEDURE DATE:  03/20/2023      INTERPRETATION:  CLINICAL STATEMENT: Fever, high white blood cell count,   abdominal pain    TECHNIQUE: Contiguous axial CT imageswere obtained from the lower chest   to the pubic symphysis after 100 cc of Omnipaque 350 intravenous   contrast.  Oral contrast was not given.  Reformatted images in the   coronal and sagittal planes were acquired.    COMPARISON CT: None.    OTHER STUDIES USED FOR CORRELATION: CT chest 3/5/2020    FINDINGS:    LOWER CHEST: There is a patchy opacity at the left lung base. There are   areas of atelectasis in both lung fields.    HEPATOBILIARY: There is a hepatic dome cyst. The gallbladder is present.    SPLEEN: Unremarkable..    PANCREAS: There is pancreatic duct dilatation measuring 5 mm in the head   of the pancreas. Outpatient pancreatic MRI with MRCP and gadolinium is   recommended for further evaluation.    ADRENAL GLANDS: Unremarkable..    KIDNEYS: Subcentimeter hypodensities are too small to characterize. There   is no hydronephrosis.    ABDOMINOPELVIC NODES: Unremarkable..    PELVIC ORGANS: The bladder is collapsed. The uterus is not seen and may   been surgically removed.    PERITONEUM/MESENTERY/BOWEL: There is no free air or obstruction. The   appendix is unremarkable. There is diverticulosis.    BONES/SOFT TISSUES: Degenerative change. Status post kyphoplasty of L1...    OTHER: Vascular calcifications. The heart is enlarged. There is mitral   valve calcification...  IMPRESSION:  Patchy opacity at the left lung base, may represent atelectasis versus   infection  Pancreatic duct dilatation measuring 5 mm in the head of the pancreas.  Outpatient pancreatic MRI with MRCP and gadolinium is necessary for   further evaluation.  --- End of Report ---  KASANDRA HULL MD; Attending Radiologist  This document has been electronically signed. Mar 20 2023 10:54PM

## 2023-03-24 NOTE — MEDICAL STUDENT PROGRESS NOTE(EDUCATION) - NS MD HP STUD ASPLAN ASSES FT
Patient is a 83y old female with pmhx of moderate-severe MR, Severe PHTN, HTN, Asthma who presents on 3/20 with a chief complaint of weakness, lethargy, found to have low-grade fever of 99.9F in ED with ?left lung base patchy opacity treated for presumed hospital acquired pneumonia over the course of this admission.

## 2023-03-24 NOTE — PROGRESS NOTE ADULT - ASSESSMENT
83 year old female, past medical history of moderate-severe mitral valve regurgitation, severe pulmonary hypertension, Asthma, Hypertension, CHF (EF 55-60% on most recent echo), and Vertigo, recently hospitalized on 3/15 for SOB, CHF Exacerbation, presenting to the ED on 3/20 for 1 day history of weakness and lethargy.     # acute bronchitis  #  Left lower lobe pneumonia- less likely  CT showing -pneumonia vs atelectasis; ID recommendation reviewed  - change antibiotics to doxy for 5 days    # Asthma- stable  monitor  continue symbicort    # Mod-Sev MR  # Sev PHTN  # HTN  # coronary calcifications  - not candidate for mitraclip, needs to do MVR  - c/w Lasix  - family requested call to [facility]; awaiting call back  - patient follows up with Dr bhatti here    # 5 mm PD dilation  - outpatient MRCP    # Vertigo  - c/w Meclizine PRN (consider D/C if worried about AMS)    # Diet DASH, TLC      DV px- lovenox  Plan of care d/w daughter at bedside  Pending: prepare for discharge; awaiting call from CT surgeon at [facility]; tolerance of oral medication        
# acute bronchitis  #  Left lower lobe pneumonia- ruled out  CT showing -pneumonia vs atelectasis; ID recommendation reviewed  -patient treated with cefepime and levofloxacin; antibiotics changed to doxy which patient did not tolerate.   patient given azithromycin which she tolerated; plan for total of 5 days    # Asthma- stable    # Mod-Sev MR  # Sev PHTN  # HTN  # coronary calcifications  - follow up with cardiology Dr bhatti/ Dr Monroe as OP  - c/w Lasix    # 5 mm PD dilation  - outpatient MRCP  - discussed with family; follow up as OP    # Vertigo    Discharge plan to home with home care.  
ASSESSMENT & PLAN:  83-year-old female w/ Mod-Sev MR, Sev PHTN, HTN, coronary calcifications, Asthma recently hospitalized for symptomatic MR presenting for weakness, lethargy, decreased activity, & decreased PO intake of 2 days duration. No fever, chills, N/V. Temp in ED was 99.9. In ED was complaining of RLQ abdominal pain that resolved by the time I saw her.    #Bronchitis  #Asthma  - WBC 19, PMN 83 (was on steroids)  - Pro-  - Trop < 0.01  - CT abdomen left lung base patchy opacity  - UA -ve  - s/p cefepime, levofloxacin  - Procal negative   - Blood cultures NGTD    Plan:  - ID following- NO SIGNS OF BACTERIAL PNEUMONIA  - Attempted to switch the patient yesterday to PO doxy, however patient not tolerating, nauseous and vomiting this AM  - IV azithromycin   - c/w albuterol, Symbicort    #Mod-Sev MR  #Sev PHTN  #HTN  #coronary calcifications  - not candidate for mitraclip, needs to do MVR  - c/w Lasix    #5 mm PD dilation  - outpatient MRCP    #Vertigo  - c/w Meclizine PRN (consider D/C if worried about AMS)      #Misc  - DVT Prophylaxis: Lovenox  - GI Prophylaxis: Protonix  - Diet: Regular  - Activity: IAT   - IV Fluids: None  - Code Status: Full     Dispo: Acute likely d/c tomorrow

## 2023-03-24 NOTE — MEDICAL STUDENT PROGRESS NOTE(EDUCATION) - NS MD HP STUD ASPLAN PLAN FT
#Bronchitis  #Asthma  - WBC 19, PMN 83 (was on steroids)  - Pro-  - Trop < 0.01  - CT abdomen left lung base patchy opacity  - UA -ve  - s/p cefepime, levofloxacin  - Procal negative   - Blood cultures NGTD  - Per ID, no signs of bacterial pneumonia  - Pt s/p 1-day IV azithromycin, given PO Azithromycin this AM, tolerated well  - c/w albuterol, Symbicort    #Mod-Sev MR  #Sev PHTN  #HTN  #coronary calcifications  -Cardiology following  - not candidate for mitraclip, needs to do MVR  - c/w Lasix    #5 mm PD dilation  - outpatient MRCP    #Vertigo  - c/w Meclizine PRN (consider D/C if worried about AMS)      #Misc  - DVT Prophylaxis: Lovenox  - GI Prophylaxis: Protonix  - Diet: Regular  - Activity: IAT   - IV Fluids: None  - Code Status: Full

## 2023-03-24 NOTE — PROGRESS NOTE ADULT - SUBJECTIVE AND OBJECTIVE BOX
HPI  Patient is a 83y old Female who presents with a chief complaint of HAP (24 Mar 2023 09:28)    Currently admitted to medicine with the primary diagnosis of Bronchitis, acute       Today is hospital day 4d.     INTERVAL HPI / OVERNIGHT EVENTS:  Patient was seen and examined at bedside    Patient Feels better  No new complaints  Denies any complains of chest pain or shortness of breath  Denies any abdominal pain/nausea/vomiting        PAST MEDICAL & SURGICAL HISTORY  Asthma      ALLERGIES  No Known Allergies    MEDICATIONS  STANDING MEDICATIONS  azithromycin   Tablet 500 milliGRAM(s) Oral daily  budesonide  80 MICROgram(s)/formoterol 4.5 MICROgram(s) Inhaler 2 Puff(s) Inhalation two times a day  enoxaparin Injectable 40 milliGRAM(s) SubCutaneous every 24 hours  furosemide    Tablet 40 milliGRAM(s) Oral daily  polyethylene glycol 3350 17 Gram(s) Oral daily    PRN MEDICATIONS  acetaminophen     Tablet .. 650 milliGRAM(s) Oral every 6 hours PRN  albuterol    90 MICROgram(s) HFA Inhaler 2 Puff(s) Inhalation every 6 hours PRN  aluminum hydroxide/magnesium hydroxide/simethicone Suspension 30 milliLiter(s) Oral every 4 hours PRN  meclizine 25 milliGRAM(s) Oral three times a day PRN  melatonin 3 milliGRAM(s) Oral at bedtime PRN  ondansetron Injectable 4 milliGRAM(s) IV Push every 8 hours PRN  senna 2 Tablet(s) Oral at bedtime PRN    VITALS:  T(F): 96.7  HR: 75  BP: 163/72  RR: 18  SpO2: --    PHYSICAL EXAM  GEN: no distress, comfortable  PULM: BS heard b/l equal, No wheezing  CVS: S1S2 present, no rubs or gallops  ABD: Soft, non-distended, no guarding; non-tender  EXT: No lower extremity edema  NEURO: A&Ox3, moving all extremities    LABS                        11.5   6.75  )-----------( 190      ( 24 Mar 2023 12:21 )             34.5     03-24    134<L>  |  96<L>  |  26<H>  ----------------------------<  83  4.0   |  25  |  1.0    Ca    8.8      24 Mar 2023 12:21  Mg     2.1     03-24    TPro  6.5  /  Alb  3.8  /  TBili  0.3  /  DBili  x   /  AST  14  /  ALT  12  /  AlkPhos  56  03-24                  RADIOLOGY    
SUBJ: Patient seen and examined. No events overnight.       MEDICATIONS  (STANDING):  azithromycin   Tablet 500 milliGRAM(s) Oral daily  budesonide  80 MICROgram(s)/formoterol 4.5 MICROgram(s) Inhaler 2 Puff(s) Inhalation two times a day  enoxaparin Injectable 40 milliGRAM(s) SubCutaneous every 24 hours  furosemide    Tablet 40 milliGRAM(s) Oral daily  polyethylene glycol 3350 17 Gram(s) Oral daily    MEDICATIONS  (PRN):  acetaminophen     Tablet .. 650 milliGRAM(s) Oral every 6 hours PRN Temp greater or equal to 38C (100.4F), Mild Pain (1 - 3)  albuterol    90 MICROgram(s) HFA Inhaler 2 Puff(s) Inhalation every 6 hours PRN Shortness of Breath and/or Wheezing  aluminum hydroxide/magnesium hydroxide/simethicone Suspension 30 milliLiter(s) Oral every 4 hours PRN Dyspepsia  meclizine 25 milliGRAM(s) Oral three times a day PRN Dizziness  melatonin 3 milliGRAM(s) Oral at bedtime PRN Insomnia  ondansetron Injectable 4 milliGRAM(s) IV Push every 8 hours PRN Nausea and/or Vomiting  senna 2 Tablet(s) Oral at bedtime PRN Constipation            Vital Signs Last 24 Hrs  T(C): 36 (24 Mar 2023 04:01), Max: 37.2 (23 Mar 2023 14:40)  T(F): 96.8 (24 Mar 2023 04:01), Max: 98.9 (23 Mar 2023 14:40)  HR: 69 (24 Mar 2023 04:01) (69 - 78)  BP: 98/45 (24 Mar 2023 04:01) (98/45 - 122/56)  BP(mean): --  RR: 18 (24 Mar 2023 04:01) (18 - 18)  SpO2: --         REVIEW OF SYSTEMS:  CONSTITUTIONAL: No fever  NECK: No pain or stiffness  CARDIOVASCULAR: patient denies chest pain, shortness of breath improved.  Respiratory: No cough or wheezing.  NEUROLOGICAL: No focal deficits to report.  GI: no BRBPR, no N,V,diarrhea.    PSYCHIATRY: normal mood and affect  HEENT: no nasal discharge, no ecchymosis  SKIN: no ecchymosis, no breakdown  MUSCULOSKELETAL: Full range of motion x4.      PHYSICAL EXAM:  GENERAL: NAD  HEAD:  Atraumatic, Normocephalic  NECK: Supple, No JVD  NERVOUS SYSTEM:  Alert & Oriented X3, Good concentration  CHEST/LUNG: Clear to anterior auscultation bilaterally  HEART: Regular rate and rhythm; HS murmurs  ABDOMEN: Soft, Nontender, Nondistended;   EXTREMITIES:  No  edema  SKIN: No rashes or lesions  Psych: Appropriate mood and affect     	  TELEMETRY:      LABS:                        11.8   7.73  )-----------( 182      ( 23 Mar 2023 08:57 )             35.0     03-23    134<L>  |  101  |  27<H>  ----------------------------<  169<H>  4.1   |  21  |  0.9    Ca    8.8      23 Mar 2023 08:56  Mg     2.0     03-23    TPro  6.4  /  Alb  3.6  /  TBili  0.4  /  DBili  x   /  AST  16  /  ALT  13  /  AlkPhos  64  03-23            I&O's Summary    BNP  RADIOLOGY & ADDITIONAL STUDIES:    IMPRESSION AND PLAN:  Moderate to sever MR  Euvolemic   Bronchitis  - Management as per primary team  - Continue Lasix and follow I&O  - Will follow out patient        
SUBJ: Patient seen and examined. No events overnight.       MEDICATIONS  (STANDING):  budesonide  80 MICROgram(s)/formoterol 4.5 MICROgram(s) Inhaler 2 Puff(s) Inhalation two times a day  doxycycline monohydrate Capsule 100 milliGRAM(s) Oral every 12 hours  enoxaparin Injectable 40 milliGRAM(s) SubCutaneous every 24 hours  furosemide    Tablet 40 milliGRAM(s) Oral daily  polyethylene glycol 3350 17 Gram(s) Oral daily    MEDICATIONS  (PRN):  acetaminophen     Tablet .. 650 milliGRAM(s) Oral every 6 hours PRN Temp greater or equal to 38C (100.4F), Mild Pain (1 - 3)  albuterol    90 MICROgram(s) HFA Inhaler 2 Puff(s) Inhalation every 6 hours PRN Shortness of Breath and/or Wheezing  aluminum hydroxide/magnesium hydroxide/simethicone Suspension 30 milliLiter(s) Oral every 4 hours PRN Dyspepsia  meclizine 25 milliGRAM(s) Oral three times a day PRN Dizziness  melatonin 3 milliGRAM(s) Oral at bedtime PRN Insomnia  ondansetron Injectable 4 milliGRAM(s) IV Push every 8 hours PRN Nausea and/or Vomiting  senna 2 Tablet(s) Oral at bedtime PRN Constipation            Vital Signs Last 24 Hrs  T(C): 36.5 (22 Mar 2023 19:55), Max: 36.6 (22 Mar 2023 04:25)  T(F): 97.7 (22 Mar 2023 19:55), Max: 97.8 (22 Mar 2023 04:25)  HR: 75 (22 Mar 2023 19:55) (71 - 82)  BP: 125/60 (22 Mar 2023 19:55) (114/53 - 141/63)  BP(mean): --  RR: 18 (22 Mar 2023 19:55) (16 - 18)  SpO2: --         REVIEW OF SYSTEMS:  CONSTITUTIONAL: No fever  NECK: No pain or stiffness  CARDIOVASCULAR: patient denies chest pain, shortness of breath or palpitations .  Respiratory: No cough or wheezing.  NEUROLOGICAL: No focal deficits to report.  GI: no BRBPR, no N,V,diarrhea.    PSYCHIATRY: normal mood and affect  HEENT: no nasal discharge, no ecchymosis  SKIN: no ecchymosis, no breakdown  MUSCULOSKELETAL: Full range of motion x4.      PHYSICAL EXAM:  GENERAL: NAD  HEAD:  Atraumatic, Normocephalic  NECK: Supple, No JVD  NERVOUS SYSTEM:  Alert & Oriented X3, Good concentration  CHEST/LUNG: Clear to anterior auscultation bilaterally  HEART: Regular rate and rhythm; HS murmur  ABDOMEN: Soft, Nontender, Nondistended;   EXTREMITIES:  No  edema  SKIN: No rashes or lesions  Psych: Appropriate mood and affect     	  TELEMETRY:      LABS:                        12.8   8.52  )-----------( 197      ( 22 Mar 2023 08:38 )             38.4     03-22    139  |  103  |  18  ----------------------------<  107<H>  4.1   |  26  |  0.9    Ca    9.1      22 Mar 2023 08:38  Phos  3.7     03-21  Mg     2.0     03-21    TPro  5.6<L>  /  Alb  3.3<L>  /  TBili  0.7  /  DBili  x   /  AST  12  /  ALT  15  /  AlkPhos  62  03-21        PT/INR - ( 21 Mar 2023 06:37 )   PT: 14.50 sec;   INR: 1.26 ratio         PTT - ( 21 Mar 2023 06:37 )  PTT:30.2 sec    I&O's Summary    BNP  RADIOLOGY & ADDITIONAL STUDIES:    IMPRESSION AND PLAN:  Moderate to sever MR  Euvolemic   Bronchitis  - Management as per primary team  - Continue Lasix and follow I&O  - Consider low dose BB ( Metoprolol succinate 25 mg )   - Will follow outpt     
VIANCA NEVAREZ 83y Female  MRN#: [id number]   Hospital Day: 3d    SUBJECTIVE  Patient is a 83y old Female who presents with a chief complaint of HAP (23 Mar 2023 10:52)  Currently admitted to medicine with the primary diagnosis of Pneumonia      INTERVAL HPI AND OVERNIGHT EVENTS:  Patient was examined and seen at bedside. This morning she is in bed with a basin full of vomit on her lap.     REVIEW OF SYMPTOMS:  States "I am not doing good, I do not feel well"    OBJECTIVE  PAST MEDICAL & SURGICAL HISTORY  Asthma      ALLERGIES:  No Known Allergies    MEDICATIONS:  STANDING MEDICATIONS  azithromycin  IVPB      azithromycin  IVPB 500 milliGRAM(s) IV Intermittent once  budesonide  80 MICROgram(s)/formoterol 4.5 MICROgram(s) Inhaler 2 Puff(s) Inhalation two times a day  enoxaparin Injectable 40 milliGRAM(s) SubCutaneous every 24 hours  furosemide    Tablet 40 milliGRAM(s) Oral daily  polyethylene glycol 3350 17 Gram(s) Oral daily    PRN MEDICATIONS  acetaminophen     Tablet .. 650 milliGRAM(s) Oral every 6 hours PRN  albuterol    90 MICROgram(s) HFA Inhaler 2 Puff(s) Inhalation every 6 hours PRN  aluminum hydroxide/magnesium hydroxide/simethicone Suspension 30 milliLiter(s) Oral every 4 hours PRN  meclizine 25 milliGRAM(s) Oral three times a day PRN  melatonin 3 milliGRAM(s) Oral at bedtime PRN  ondansetron Injectable 4 milliGRAM(s) IV Push every 8 hours PRN  senna 2 Tablet(s) Oral at bedtime PRN      VITAL SIGNS: Last 24 Hours  T(C): 36.2 (23 Mar 2023 03:51), Max: 36.5 (22 Mar 2023 19:55)  T(F): 97.2 (23 Mar 2023 03:51), Max: 97.7 (22 Mar 2023 19:55)  HR: 77 (23 Mar 2023 03:51) (75 - 82)  BP: 127/62 (23 Mar 2023 03:51) (125/60 - 141/63)  BP(mean): --  RR: 18 (23 Mar 2023 03:51) (16 - 18)  SpO2: 98% (23 Mar 2023 03:51) (98% - 98%)    LABS:                        11.8   7.73  )-----------( 182      ( 23 Mar 2023 08:57 )             35.0     03-23    134<L>  |  101  |  27<H>  ----------------------------<  169<H>  4.1   |  21  |  0.9    Ca    8.8      23 Mar 2023 08:56  Mg     2.0     03-23    TPro  6.4  /  Alb  3.6  /  TBili  0.4  /  DBili  x   /  AST  16  /  ALT  13  /  AlkPhos  64  03-23        Culture - Blood (collected 21 Mar 2023 06:39)  Source: .Blood None  Preliminary Report (22 Mar 2023 18:02):    No growth to date.    Culture - Blood (collected 21 Mar 2023 06:37)  Source: .Blood None  Preliminary Report (22 Mar 2023 18:02):    No growth to date.    Culture - Urine (collected 20 Mar 2023 22:34)  Source: Clean Catch Clean Catch (Midstream)  Final Report (21 Mar 2023 21:58):    <10,000 CFU/mL Normal Urogenital Marlene        PHYSICAL EXAM:  CONSTITUTIONAL: Lethargic, Ill appearing, AAOx3  HEAD: Atraumatic, normocephalic  EYES: EOM intact, PERRLA, conjunctiva and sclera clear  ENT: No JVD; moist mucous membranes  PULMONARY: Clear to auscultation bilaterally  CARDIOVASCULAR: Regular rate and rhythm  GASTROINTESTINAL: Soft, non-tender, non-distended  MUSCULOSKELETAL: 2+ peripheral pulses; No lower extremity edema  SKIN: No rashes or lesions; warm and dry    
HPI  Patient is a 83y old Female who presents with a chief complaint of HAP (22 Mar 2023 09:47)    Currently admitted to medicine with the primary diagnosis of Pneumonia       Today is hospital day 2d.     INTERVAL HPI / OVERNIGHT EVENTS:  Patient was seen and examined at bedside  Patient Feels better  No new complaints  Denies any complains of chest pain or shortness of breath  Denies any abdominal pain/nausea/vomiting        PAST MEDICAL & SURGICAL HISTORY  Asthma      ALLERGIES  No Known Allergies    MEDICATIONS  STANDING MEDICATIONS  budesonide  80 MICROgram(s)/formoterol 4.5 MICROgram(s) Inhaler 2 Puff(s) Inhalation two times a day  doxycycline IVPB      doxycycline IVPB 100 milliGRAM(s) IV Intermittent every 12 hours  enoxaparin Injectable 40 milliGRAM(s) SubCutaneous every 24 hours  furosemide    Tablet 40 milliGRAM(s) Oral daily  polyethylene glycol 3350 17 Gram(s) Oral daily    PRN MEDICATIONS  acetaminophen     Tablet .. 650 milliGRAM(s) Oral every 6 hours PRN  albuterol    90 MICROgram(s) HFA Inhaler 2 Puff(s) Inhalation every 6 hours PRN  aluminum hydroxide/magnesium hydroxide/simethicone Suspension 30 milliLiter(s) Oral every 4 hours PRN  meclizine 25 milliGRAM(s) Oral three times a day PRN  melatonin 3 milliGRAM(s) Oral at bedtime PRN  ondansetron Injectable 4 milliGRAM(s) IV Push every 8 hours PRN  senna 2 Tablet(s) Oral at bedtime PRN    VITALS:  T(F): 97.8  HR: 71  BP: 114/53  RR: 18  SpO2: 97%    PHYSICAL EXAM  GEN: no distress, comfortable  PULM: BS heard b/l equal, left basilar coarse crepts- improved after forced expiration  CVS: S1S2 present, no rubs or gallops  ABD: Soft, non-distended, no guarding; non-tender  EXT: No lower extremity edema  NEURO: A&Ox3, moving all extremities    LABS                        12.8   8.52  )-----------( 197      ( 22 Mar 2023 08:38 )             38.4     -    139  |  103  |  18  ----------------------------<  107<H>  4.1   |  26  |  0.9    Ca    9.1      22 Mar 2023 08:38  Phos  3.7     03-  Mg     2.0     03-21    TPro  5.6<L>  /  Alb  3.3<L>  /  TBili  0.7  /  DBili  x   /  AST  12  /  ALT  15  /  AlkPhos  62  03-21    PT/INR - ( 21 Mar 2023 06:37 )   PT: 14.50 sec;   INR: 1.26 ratio         PTT - ( 21 Mar 2023 06:37 )  PTT:30.2 sec  Urinalysis Basic - ( 20 Mar 2023 22:34 )    Color: Light Yellow / Appearance: Clear / S.018 / pH: x  Gluc: x / Ketone: Negative  / Bili: Negative / Urobili: <2 mg/dL   Blood: x / Protein: Negative / Nitrite: Negative   Leuk Esterase: Negative / RBC: 15 /HPF / WBC 1 /HPF   Sq Epi: x / Non Sq Epi: 1 /HPF / Bacteria: Negative            Culture - Urine (collected 20 Mar 2023 22:34)  Source: Clean Catch Clean Catch (Midstream)  Final Report (21 Mar 2023 21:58):    <10,000 CFU/mL Normal Urogenital Marlene      CARDIAC MARKERS ( 20 Mar 2023 19:33 )  x     / <0.01 ng/mL / x     / x     / x          RADIOLOGY

## 2023-03-24 NOTE — GOALS OF CARE CONVERSATION - ADVANCED CARE PLANNING - CONVERSATION DETAILS
Patient is about to be discharged from the hospital, but wanted to fill out a MOLST form before she left. At baseline this patient can preform most of her ADL's. She has an established home health aid who assists her as well. Patient and her two daughters were in the room when the conversation was had.     Patient stated that she did not want any compressions, "If my heart is not working, let me go".     In the event that her lungs stop working or she needs respiratory support, she does want to be intubated. I explained to the patient that with intubation there are two avenues. I explained that when patient's are intubated, they can only be supported with the ET tube for 14-21 days before a more permanent option like a tracheostomy has to be preformed. I explained that this is a permanent breathing support device that allows patient's to stay on the ventilator indefinitely. The patient was initially confused because she did not know that long term your heart could work but your lungs would not. I explained that she could do a "trial of intubation" which is where while she had the ET tube in, we would do everything in our power to help get her lungs functioning, but if we could not extubate her we could "palliatively extubate her", or make her comfortable, remove the tube, and let her pass. She DOES NOT WANT TO UNDERGO A TRACHEOSTOMY BUT DOES WANT TO BE INTUBATED.    Finally, I asked that patient about artificial feeding, or the placement of a permanent feeding tube. Patient is admittedly against having a feeding tube, so no artificial means of feeding.     Summary:  1) DNR- No compressions  2) Trial of intubation- NO TRACHEOSTOMY  3) No permanent feeding tubes Patient is about to be discharged from the hospital, but wanted to fill out a MOLST form before she left. At baseline this patient can preform most of her ADL's. She has an established home health aid who assists her as well. Patient and her two daughters were in the room when the conversation was  had.     Patient stated that she did not want any compressions, "If my heart is not working, let me go".     In the event that her lungs stop working or she needs respiratory support, she does want to be intubated. I explained to the patient that with intubation there are two avenues. I explained that when patient's are intubated, they can only be supported with the ET tube for 14-21 days before a more permanent option like a tracheostomy has to be preformed. I explained that this is a permanent breathing support device that allows patient's to stay on the ventilator indefinitely. The patient was initially confused because she did not know that long term your heart could work but your lungs would not. I explained that she could do a "trial of intubation" which is where while she had the ET tube in, we would do everything in our power to help get her lungs functioning, but if we could not extubate her we could "palliatively extubate her", or make her comfortable, remove the tube, and let her pass. She DOES NOT WANT TO UNDERGO A TRACHEOSTOMY BUT DOES WANT TO BE INTUBATED.    Finally, I asked that patient about artificial feeding, or the placement of a permanent feeding tube. Patient is admittedly against having a feeding tube, so no artificial means of feeding.     Summary:  1) DNR- No compressions  2) Trial of intubation- NO TRACHEOSTOMY  3) No permanent feeding tubes

## 2023-03-25 ENCOUNTER — TRANSCRIPTION ENCOUNTER (OUTPATIENT)
Age: 83
End: 2023-03-25

## 2023-03-26 PROBLEM — J45.909 UNSPECIFIED ASTHMA, UNCOMPLICATED: Chronic | Status: ACTIVE | Noted: 2023-03-08

## 2023-03-26 PROBLEM — I10 HTN (HYPERTENSION): Status: ACTIVE | Noted: 2023-03-26

## 2023-03-26 PROBLEM — I34.0 NON-RHEUMATIC MITRAL REGURGITATION: Status: ACTIVE | Noted: 2023-03-26

## 2023-03-26 LAB
CULTURE RESULTS: SIGNIFICANT CHANGE UP
CULTURE RESULTS: SIGNIFICANT CHANGE UP
SPECIMEN SOURCE: SIGNIFICANT CHANGE UP
SPECIMEN SOURCE: SIGNIFICANT CHANGE UP

## 2023-03-26 RX ORDER — FUROSEMIDE 40 MG/1
40 TABLET ORAL DAILY
Qty: 14 | Refills: 2 | Status: ACTIVE | COMMUNITY

## 2023-03-26 RX ORDER — RAMIPRIL 5 MG/1
5 CAPSULE ORAL
Refills: 0 | Status: ACTIVE | COMMUNITY

## 2023-03-26 RX ORDER — EZETIMIBE 10 MG/1
10 TABLET ORAL
Qty: 90 | Refills: 3 | Status: ACTIVE | COMMUNITY
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
## 2023-03-26 NOTE — ASSESSMENT
[FreeTextEntry1] : Moderate to Severe MR\par Secondary (anular dilatation)\par Poor MitraClip anatomy\par \par Chronic Diastolic CHF\par Compensated\par \par BP elevated

## 2023-03-26 NOTE — PHYSICAL EXAM
[de-identified] : well appearin, no distress [de-identified] : reg, nL s1/s2, 2/6 sm, no r/g [de-identified] : CTA [de-identified] : no edema [de-identified] : alert, normal affect

## 2023-03-26 NOTE — DISCUSSION/SUMMARY
[FreeTextEntry1] : Cont Lasix\par Cont Ramipril\par Follow-up Dr. Monroe (general cardiology)\par TMVR discussed.  Would need CT for further evaluation.\par Follow-up [facility].\par

## 2023-03-27 ENCOUNTER — TRANSCRIPTION ENCOUNTER (OUTPATIENT)
Age: 83
End: 2023-03-27

## 2023-03-28 ENCOUNTER — APPOINTMENT (OUTPATIENT)
Age: 83
End: 2023-03-28
Payer: MEDICARE

## 2023-03-28 VITALS
HEART RATE: 79 BPM | SYSTOLIC BLOOD PRESSURE: 108 MMHG | DIASTOLIC BLOOD PRESSURE: 60 MMHG | OXYGEN SATURATION: 97 % | RESPIRATION RATE: 18 BRPM

## 2023-03-28 DIAGNOSIS — J18.9 PNEUMONIA, UNSPECIFIED ORGANISM: ICD-10-CM

## 2023-03-28 DIAGNOSIS — J45.909 UNSPECIFIED ASTHMA, UNCOMPLICATED: ICD-10-CM

## 2023-03-28 DIAGNOSIS — I50.30 UNSPECIFIED DIASTOLIC (CONGESTIVE) HEART FAILURE: ICD-10-CM

## 2023-03-28 PROCEDURE — 99349 HOME/RES VST EST MOD MDM 40: CPT

## 2023-03-28 RX ORDER — FLUTICASONE FUROATE 100 UG/1
100 POWDER RESPIRATORY (INHALATION)
Refills: 0 | Status: DISCONTINUED | COMMUNITY
End: 2023-03-28

## 2023-03-28 RX ORDER — FLUTICASONE FUROATE AND VILANTEROL TRIFENATATE 100; 25 UG/1; UG/1
100-25 POWDER RESPIRATORY (INHALATION)
Refills: 0 | Status: ACTIVE | COMMUNITY
Start: 2023-03-28

## 2023-03-28 NOTE — INTERPRETER SERVICES
[[employer] ] : provided by [employer]   [Time Spent: ____ minutes] : Total time spent using  services: [unfilled] minutes. The patient's primary language is not English thus required  services. [Interpreters_IDNumber] : [id number] [Interpreters_FullName] : blanca [TWNoteComboBox1] : [place]

## 2023-03-28 NOTE — PLAN
[FreeTextEntry1] : CHF:c/w lasix i/o's, dw. low salt/sodium diet. fluid restriction. f/u cardio.\par \par Asthma/PNA:c/w inhalers. f/u pcp/pulmonary.

## 2023-03-28 NOTE — ASSESSMENT
[FreeTextEntry1] : Patient is an 83 year old female enrolled in the [facility] TCM program s/p a recent discharge from [facility] 3/20-3/24 for PNA. PMH  moderate-severe mitral valve regurgitation, severe pulmonary hypertension, Asthma, Hypertension, CHF (EF 55-60% on most recent echo), and Vertigo. Patient was reated with abx and sent home with HCS. HCS in place. Upon arrival patient alert in nad, denies cp, sob, edema, fever, chills, n/v/d, increased use of rescue inahlers. Reports cough. F/U appointments with pcp 3/27, cardio 4/6, and pulmonary next week. Patient was contacted by VIDAL vázquez from TCM and medication reconciliation was done with in 48 hours of discharge 3/25.

## 2023-03-28 NOTE — HISTORY OF PRESENT ILLNESS
[Formal Caregiver] : formal caregiver [FreeTextEntry1] : F/U hospital discharge for PNA. [de-identified] : Patient is an 83 year old female enrolled in the [facility] TCM program s/p a recent discharge from [facility] 3/20-3/24 for PNA. PMH  moderate-severe mitral valve regurgitation, severe pulmonary hypertension, Asthma, Hypertension, CHF (EF 55-60% on most recent echo), and Vertigo. Patient was reated with abx and sent home with HCS. HCS in place. Upon arrival patient alert in nad, denies cp, sob, edema, fever, chills, n/v/d, increased use of rescue inahler. Reports cough. F/U appointments with pcp 3/27, cardio 4/6, and pulmonary next week. Patient was contacted by VIDAL vázquez from [facility] and medication reconciliation was done with in 48 hours of discharge 3/25.\par \par Copied from SCM:\par Hospital Course: Pt is a 83 year old female, past medical history of moderate-severe mitral valve regurgitation, severe pulmonary hypertension, Asthma, Hypertension, CHF (EF 55-60% on most recent echo), and Vertigo, recently hospitalized on 3/15 for SOB, CHF Exacerbation, presenting to the ED on 3/20 for 1 day history of weakness and lethargy. \par In the ED, Pt was found to have an elevated WBC of 19,000, Chest x-ray was negative for pneumonia, however CT scan was notable for left lower infiltrate vs atelectasis. Pt was admitted and started on cefepime, levofloxacin for empiric treatment of hospital acquired pneumonia. \par In the ED, initial vitals on presentation were as follows: Temp 37.1C, HR 90, Bp 129/60, RR 18, SPO2 96% on room air. \par Following admission, Pt's respiratory panel was negative, Procal was negative, BNP was at baseline, Urinalysis was negative. Pt was seen by Infectious Disease, who recommended PO Doxycycline 100mg q12. \par Diagnosis:\par # acute bronchitis\par # Left lower lobe pneumonia- ruled out\par CT showing -pneumonia vs atelectasis; ID recommendation reviewed\par -patient treated with cefepime and levofloxacin; antibiotics changed to doxy which patient did not tolerate. \par patient given azithromycin which she tolerated; plan for total of 5 days\par # Asthma- stable\par # Mod-Sev MR\par # Sev PHTN\par # HTN\par # coronary calcifications\par - follow up with cardiology Dr bhatti/ Dr Monroe as OP\par - c/w Lasix\par # 5 mm PD dilation\par - outpatient MRCP\par - discussed with family; follow up as OP\par # Vertigo\par

## 2023-03-28 NOTE — COUNSELING
[None] : None [de-identified] : Complete ABT\par Adhere to all medications including demonstrating proper use of nebulizers.\par Increase activity as tolerated and maintain optimal activity levels. Continue coughing and deep breathing exercises including use of Incentive Spirometry.\par Receive routine pneumococcal and influenza vaccinations.\par Maintain proper nutrition and adequate hydration.\par Notify NP for worsening symptoms including fever, chills, SOB, CP, increased cough and secretions.\par Follow up with MD within 7 days of discharge.\par \par

## 2023-03-28 NOTE — PHYSICAL EXAM
[No Acute Distress] : no acute distress [Well Developed] : well developed [Well-Appearing] : well-appearing [Normal Sclera/Conjunctiva] : normal sclera/conjunctiva [Normal Outer Ear/Nose] : the outer ears and nose were normal in appearance [No Respiratory Distress] : no respiratory distress  [Clear to Auscultation] : lungs were clear to auscultation bilaterally [Normal Rate] : normal rate  [Regular Rhythm] : with a regular rhythm [No Edema] : there was no peripheral edema [Soft] : abdomen soft [Non Tender] : non-tender [Normal Affect] : the affect was normal [Alert and Oriented x3] : oriented to person, place, and time [Normal Mood] : the mood was normal [de-identified] : + murmur [de-identified] : uses walker

## 2023-03-29 DIAGNOSIS — Z79.51 LONG TERM (CURRENT) USE OF INHALED STEROIDS: ICD-10-CM

## 2023-03-29 DIAGNOSIS — R50.9 FEVER, UNSPECIFIED: ICD-10-CM

## 2023-03-29 DIAGNOSIS — Z71.3 DIETARY COUNSELING AND SURVEILLANCE: ICD-10-CM

## 2023-03-29 DIAGNOSIS — I34.0 NONRHEUMATIC MITRAL (VALVE) INSUFFICIENCY: ICD-10-CM

## 2023-03-29 DIAGNOSIS — I25.84 CORONARY ATHEROSCLEROSIS DUE TO CALCIFIED CORONARY LESION: ICD-10-CM

## 2023-03-29 DIAGNOSIS — Z88.5 ALLERGY STATUS TO NARCOTIC AGENT: ICD-10-CM

## 2023-03-29 DIAGNOSIS — I27.20 PULMONARY HYPERTENSION, UNSPECIFIED: ICD-10-CM

## 2023-03-29 DIAGNOSIS — H81.10 BENIGN PAROXYSMAL VERTIGO, UNSPECIFIED EAR: ICD-10-CM

## 2023-03-29 DIAGNOSIS — J20.9 ACUTE BRONCHITIS, UNSPECIFIED: ICD-10-CM

## 2023-03-29 DIAGNOSIS — D72.828 OTHER ELEVATED WHITE BLOOD CELL COUNT: ICD-10-CM

## 2023-03-29 DIAGNOSIS — J45.909 UNSPECIFIED ASTHMA, UNCOMPLICATED: ICD-10-CM

## 2023-03-29 DIAGNOSIS — K86.89 OTHER SPECIFIED DISEASES OF PANCREAS: ICD-10-CM

## 2023-03-29 DIAGNOSIS — I11.0 HYPERTENSIVE HEART DISEASE WITH HEART FAILURE: ICD-10-CM

## 2023-03-29 DIAGNOSIS — I50.32 CHRONIC DIASTOLIC (CONGESTIVE) HEART FAILURE: ICD-10-CM

## 2023-03-29 DIAGNOSIS — Z79.52 LONG TERM (CURRENT) USE OF SYSTEMIC STEROIDS: ICD-10-CM

## 2023-03-29 DIAGNOSIS — Z20.822 CONTACT WITH AND (SUSPECTED) EXPOSURE TO COVID-19: ICD-10-CM

## 2023-03-29 DIAGNOSIS — T38.0X5A ADVERSE EFFECT OF GLUCOCORTICOIDS AND SYNTHETIC ANALOGUES, INITIAL ENCOUNTER: ICD-10-CM

## 2023-04-05 ENCOUNTER — TRANSCRIPTION ENCOUNTER (OUTPATIENT)
Age: 83
End: 2023-04-05

## 2023-04-11 ENCOUNTER — TRANSCRIPTION ENCOUNTER (OUTPATIENT)
Age: 83
End: 2023-04-11

## 2023-04-18 ENCOUNTER — APPOINTMENT (OUTPATIENT)
Dept: HEART AND VASCULAR | Facility: CLINIC | Age: 83
End: 2023-04-18

## 2023-09-20 ENCOUNTER — APPOINTMENT (OUTPATIENT)
Dept: ORTHOPEDIC SURGERY | Facility: CLINIC | Age: 83
End: 2023-09-20
Payer: MEDICARE

## 2023-09-20 VITALS — HEIGHT: 59 IN | WEIGHT: 124 LBS | BODY MASS INDEX: 25 KG/M2

## 2023-09-20 PROCEDURE — 99214 OFFICE O/P EST MOD 30 MIN: CPT | Mod: 25

## 2023-09-20 PROCEDURE — 20611 DRAIN/INJ JOINT/BURSA W/US: CPT | Mod: 50

## 2023-10-23 ENCOUNTER — APPOINTMENT (OUTPATIENT)
Dept: ORTHOPEDIC SURGERY | Facility: CLINIC | Age: 83
End: 2023-10-23
Payer: MEDICARE

## 2023-10-23 VITALS — BODY MASS INDEX: 25.4 KG/M2 | WEIGHT: 126 LBS | HEIGHT: 59 IN

## 2023-10-23 PROCEDURE — 99213 OFFICE O/P EST LOW 20 MIN: CPT | Mod: 25

## 2023-10-23 PROCEDURE — 20611 DRAIN/INJ JOINT/BURSA W/US: CPT | Mod: 50

## 2023-10-30 ENCOUNTER — APPOINTMENT (OUTPATIENT)
Dept: ORTHOPEDIC SURGERY | Facility: CLINIC | Age: 83
End: 2023-10-30
Payer: MEDICARE

## 2023-10-30 PROCEDURE — 20611 DRAIN/INJ JOINT/BURSA W/US: CPT | Mod: 50

## 2023-11-06 ENCOUNTER — APPOINTMENT (OUTPATIENT)
Dept: ORTHOPEDIC SURGERY | Facility: CLINIC | Age: 83
End: 2023-11-06
Payer: MEDICARE

## 2023-11-06 PROCEDURE — 20611 DRAIN/INJ JOINT/BURSA W/US: CPT | Mod: 50

## 2023-11-09 NOTE — ED PROVIDER NOTE - LANGUAGE ASSISTANCE NEEDED
Detail Level: Zone
Photo Preface (Leave Blank If You Do Not Want): Photographs were obtained today
No-Patient/Caregiver offered and refused free interpretation services.

## 2024-05-06 ENCOUNTER — APPOINTMENT (OUTPATIENT)
Dept: ORTHOPEDIC SURGERY | Facility: CLINIC | Age: 84
End: 2024-05-06

## 2024-05-13 NOTE — PHYSICAL THERAPY INITIAL EVALUATION ADULT - ASSISTIVE DEVICE FOR TRANSFER: STAND/SIT, REHAB EVAL
[facility]  Cancel Note/ No Show Note    Date: 2024  Patient Name: Ignacio Velez        MRN: [id number]    Account #: [id number]  : 2020  (3 y.o.)  Gender: male                REASON FOR MISSED TREATMENT:    []Cancelled due to illness.  [] Therapist Cancelled Appointment  [x]Cancelled due to other appointment   []No Show / No call.  Pt called with next scheduled appointment.  [] Cancelled due to transportation conflict  []Cancelled due to weather  []Frequency of order changed  []Patient on hold due to:     []OTHER:        Electronically signed by:    Magaly Centeno M.A., CCC-SLP              Date:2024     rolling walker

## 2024-06-17 ENCOUNTER — APPOINTMENT (OUTPATIENT)
Dept: ORTHOPEDIC SURGERY | Facility: CLINIC | Age: 84
End: 2024-06-17
Payer: MEDICARE

## 2024-06-17 DIAGNOSIS — M17.0 BILATERAL PRIMARY OSTEOARTHRITIS OF KNEE: ICD-10-CM

## 2024-06-17 PROCEDURE — 20611 DRAIN/INJ JOINT/BURSA W/US: CPT | Mod: 50

## 2024-06-17 PROCEDURE — 99213 OFFICE O/P EST LOW 20 MIN: CPT | Mod: 25

## 2024-06-17 NOTE — PROCEDURE
[Large Joint Injection] : Large joint injection [Bilateral] : bilaterally of the [Knee] : knee [Alcohol] : alcohol [___ cc    1%] : Lidocaine ~Vcc of 1%  [___ cc    4mg] : Dexamethasone (Decadron) ~Vcc of 4 mg  [Risks, benefits, alternatives discussed / Verbal consent obtained] : the risks benefits, and alternatives have been discussed, and verbal consent was obtained [All ultrasound images have been permanently captured and stored accordingly in our picture archiving and communication system] : All ultrasound images have been permanently captured and stored accordingly in our picture archiving and communication system [Visualization of the needle and placement of injection was performed without complication] : visualization of the needle and placement of injection was performed without complication

## 2024-06-17 NOTE — HISTORY OF PRESENT ILLNESS
[de-identified] : The patient is an 84-year-old female accompanied by her daughter here for a reevaluation of bilateral knee osteoarthritis.  She is status post in the series injections for each knee, the last injection for each knee was on 11/6/2023.  The injections lasted for a few months and then has since worn off.  The right knee troubles him more than the left knee.

## 2024-06-17 NOTE — DISCUSSION/SUMMARY
[de-identified] : Today I recommend a cortisone injection for each knee, the patient agreed.  Both knees were injected with 2 cc lidocaine, 1 see dexamethasone.  Procedure note generated.  Please arrange for Synvisc series injections for each knee.  I will see her back to start the injection series for each knee.

## 2024-06-17 NOTE — IMAGING
[de-identified] : Physical exam both knees: No effusion, no erythema, no ecchymosis.  Full extension bilaterally, flexion to 110 degrees bilaterally.  Tenderness to palpation over the medial joint line of each knee.  No lateral joint line tenderness to palpation about the knee.  No tenderness to palpation over the collateral ligaments or the anterior aspect of each knee.  Intact to light touch, she is ambulating with a walker today.

## 2024-07-29 ENCOUNTER — APPOINTMENT (OUTPATIENT)
Dept: ORTHOPEDIC SURGERY | Facility: CLINIC | Age: 84
End: 2024-07-29
Payer: MEDICARE

## 2024-07-29 ENCOUNTER — EMERGENCY (EMERGENCY)
Facility: HOSPITAL | Age: 84
LOS: 0 days | Discharge: HOME | End: 2024-07-29
Attending: EMERGENCY MEDICINE
Payer: MEDICARE

## 2024-07-29 VITALS
HEART RATE: 71 BPM | OXYGEN SATURATION: 99 % | RESPIRATION RATE: 16 BRPM | DIASTOLIC BLOOD PRESSURE: 60 MMHG | TEMPERATURE: 98 F | WEIGHT: 134.92 LBS | SYSTOLIC BLOOD PRESSURE: 143 MMHG

## 2024-07-29 DIAGNOSIS — I27.20 PULMONARY HYPERTENSION, UNSPECIFIED: ICD-10-CM

## 2024-07-29 DIAGNOSIS — I34.0 NONRHEUMATIC MITRAL (VALVE) INSUFFICIENCY: ICD-10-CM

## 2024-07-29 DIAGNOSIS — J45.909 UNSPECIFIED ASTHMA, UNCOMPLICATED: ICD-10-CM

## 2024-07-29 DIAGNOSIS — K08.89 OTHER SPECIFIED DISORDERS OF TEETH AND SUPPORTING STRUCTURES: ICD-10-CM

## 2024-07-29 DIAGNOSIS — I10 ESSENTIAL (PRIMARY) HYPERTENSION: ICD-10-CM

## 2024-07-29 DIAGNOSIS — Z88.5 ALLERGY STATUS TO NARCOTIC AGENT: ICD-10-CM

## 2024-07-29 PROCEDURE — 99213 OFFICE O/P EST LOW 20 MIN: CPT | Mod: 25

## 2024-07-29 PROCEDURE — 99283 EMERGENCY DEPT VISIT LOW MDM: CPT

## 2024-07-29 PROCEDURE — 20611 DRAIN/INJ JOINT/BURSA W/US: CPT | Mod: 50

## 2024-07-29 RX ORDER — AMOXICILLIN 500 MG
1 CAPSULE ORAL
Qty: 14 | Refills: 0
Start: 2024-07-29 | End: 2024-08-04

## 2024-07-29 NOTE — ED PROVIDER NOTE - PATIENT PORTAL LINK FT
You can access the FollowMyHealth Patient Portal offered by [facility] by registering at the following website: [email or web address]. By joining [facility]’s FollowMyHealth portal, you will also be able to view your health information using other applications (apps) compatible with our system.

## 2024-07-29 NOTE — HISTORY OF PRESENT ILLNESS
[de-identified] : The patient is an 84-year-old female here for a subsequent reevaluation of bilateral knee osteoarthritis.  She is accompanied by her daughter.  The right knee is troubling her more than the left knee.

## 2024-07-29 NOTE — ED PROVIDER NOTE - PROGRESS NOTE DETAILS
AE: Patient is well-appearing.  Explained the importance of following up with a dentist.  Will discharge with strict return precautions.

## 2024-07-29 NOTE — PROCEDURE
[Large Joint Injection] : Large joint injection [Bilateral] : bilaterally of the [Knee] : knee [Alcohol] : alcohol [Synvisc (16mg)] : 16mg of Synvisc [#1] : series #1 [Risks, benefits, alternatives discussed / Verbal consent obtained] : the risks benefits, and alternatives have been discussed, and verbal consent was obtained [All ultrasound images have been permanently captured and stored accordingly in our picture archiving and communication system] : All ultrasound images have been permanently captured and stored accordingly in our picture archiving and communication system [Visualization of the needle and placement of injection was performed without complication] : visualization of the needle and placement of injection was performed without complication

## 2024-07-29 NOTE — IMAGING
[de-identified] : Physical exam both knees: No effusion, no erythema, no ecchymosis.  Full extension bilaterally.  Flexion to 90 degrees bilaterally.  Range of motion assessed with the patient sitting up on the exam table.  Intact to light touch, she is ambulating with a walker today.

## 2024-07-29 NOTE — DISCUSSION/SUMMARY
[de-identified] : Today I recommend a Synvisc injection for each knee.  The patient agreed.  Both knees were injected with 2 cc of Synvisc, procedure note generated.  This was the first injection of Synvisc for each knee.  She will ice the knee, rest the knee and use Aleve if she has any pain or soreness after the injections.  I will see her back in a week for further evaluation.

## 2024-07-29 NOTE — ED PROVIDER NOTE - OBJECTIVE STATEMENT
84-year-old female past medical history of mitral valve regurgitation, pulmonary hypertension, asthma, HTN, presents to the ED complaining of intermittent left lower dental pain for 2 months that has worsened and become constant over the last 3 days.  Pain is worsened with chewing which is causing the patient to eat less.  Patient has not had any recent fevers, headache, difficulty breathing, difficulty swallowing, or vomiting.  Patient has been trying to see a dentist, but has had difficulty with making an appointment.

## 2024-07-29 NOTE — ED PROVIDER NOTE - ATTENDING CONTRIBUTION TO CARE
84-year-old female to ED with dental pain.  Patient complaining left lower jaw pain for 2 months worsened over the past couple days of the ED for eval.  Difficulty with chewing no bruising or bleeding gums.

## 2024-07-29 NOTE — ED PROVIDER NOTE - PHYSICAL EXAMINATION
PHYSICAL EXAM: I have reviewed current vital signs.  GENERAL: NAD, well-nourished; well-developed.  HEAD:  Normocephalic, atraumatic.  EYES: Conjunctiva and sclera clear.  ENT: Poor dentition.  Tenderness to palpation on tooth #20/21.  NECK: No lymphadenopathy.  CHEST/LUNG: Clear to auscultation bilaterally; no wheezes, rales, or rhonchi.  HEART: Regular rate and rhythm, normal S1 and S2; no murmurs, rubs, or gallops.  ABDOMEN: Soft, nontender, nondistended.  EXTREMITIES:  2+ peripheral pulses; no clubbing, cyanosis, or edema.  PSYCH: Cooperative, appropriate, normal mood and affect.  NEUROLOGY: A&O x 3. No focal neurological deficits.   SKIN: Warm and dry.

## 2024-08-05 ENCOUNTER — APPOINTMENT (OUTPATIENT)
Dept: ORTHOPEDIC SURGERY | Facility: CLINIC | Age: 84
End: 2024-08-05

## 2024-08-05 PROCEDURE — 20611 DRAIN/INJ JOINT/BURSA W/US: CPT | Mod: 50

## 2024-08-05 NOTE — DISCUSSION/SUMMARY
[de-identified] : Today I recommend a Synvisc injection for each knee.  The patient agreed.  Both knees were injected with 2 cc of Synvisc, procedure note generated.  This was the second injection of Synvisc for each knee.  I will see her back in a week for further evaluation.

## 2024-08-05 NOTE — HISTORY OF PRESENT ILLNESS
[de-identified] : The patient is an 84-year-old female here for a subsequent reevaluation of bilateral knee osteoarthritis.  She had her first injection of Synvisc for each knee 1 week ago.

## 2024-08-05 NOTE — IMAGING
[de-identified] : Physical exam both knees: No effusion, no erythema, no ecchymosis.  Full extension bilaterally.  Flexion to 90 degrees bilaterally.  Range of motion assessed with the patient sitting up on the exam table.  Intact to light touch, she is ambulating with a walker today.

## 2024-08-05 NOTE — PROCEDURE
[Large Joint Injection] : Large joint injection [Bilateral] : bilaterally of the [Knee] : knee [Alcohol] : alcohol [Synvisc (16mg)] : 16mg of Synvisc [Risks, benefits, alternatives discussed / Verbal consent obtained] : the risks benefits, and alternatives have been discussed, and verbal consent was obtained [All ultrasound images have been permanently captured and stored accordingly in our picture archiving and communication system] : All ultrasound images have been permanently captured and stored accordingly in our picture archiving and communication system [Visualization of the needle and placement of injection was performed without complication] : visualization of the needle and placement of injection was performed without complication [#2] : series #2

## 2024-08-12 ENCOUNTER — APPOINTMENT (OUTPATIENT)
Dept: ORTHOPEDIC SURGERY | Facility: CLINIC | Age: 84
End: 2024-08-12

## 2024-08-19 ENCOUNTER — APPOINTMENT (OUTPATIENT)
Dept: ORTHOPEDIC SURGERY | Facility: CLINIC | Age: 84
End: 2024-08-19
Payer: MEDICARE

## 2024-08-19 DIAGNOSIS — M17.0 BILATERAL PRIMARY OSTEOARTHRITIS OF KNEE: ICD-10-CM

## 2024-08-19 PROCEDURE — 20611 DRAIN/INJ JOINT/BURSA W/US: CPT | Mod: 50

## 2024-08-19 NOTE — HISTORY OF PRESENT ILLNESS
[de-identified] : The patient is an 84-year-old female here for a subsequent reevaluation of bilateral knee osteoarthritis.  She had her second injection of Synvisc for each knee 1 week ago.

## 2024-08-19 NOTE — DISCUSSION/SUMMARY
[de-identified] : Today I recommend a Synvisc injection for each knee.  The patient agreed.  Both knees were injected with 2 cc of Synvisc, procedure note generated.  This was the third injection of Synvisc for each knee.  I will see her back in six months for further evaluation.

## 2024-08-19 NOTE — PROCEDURE
[Large Joint Injection] : Large joint injection [Bilateral] : bilaterally of the [Knee] : knee [Alcohol] : alcohol [Synvisc (16mg)] : 16mg of Synvisc [Risks, benefits, alternatives discussed / Verbal consent obtained] : the risks benefits, and alternatives have been discussed, and verbal consent was obtained [All ultrasound images have been permanently captured and stored accordingly in our picture archiving and communication system] : All ultrasound images have been permanently captured and stored accordingly in our picture archiving and communication system [Visualization of the needle and placement of injection was performed without complication] : visualization of the needle and placement of injection was performed without complication [#3] : series #3

## 2024-08-19 NOTE — IMAGING
[de-identified] : Physical exam both knees: No effusion, no erythema, no ecchymosis.  Full extension bilaterally.  Flexion to 90 degrees bilaterally.  Range of motion assessed with the patient sitting up on the exam table.  Intact to light touch, she is ambulating with a walker today.

## 2024-08-28 ENCOUNTER — OUTPATIENT (OUTPATIENT)
Dept: OUTPATIENT SERVICES | Facility: HOSPITAL | Age: 84
LOS: 1 days | End: 2024-08-28
Payer: MEDICAID

## 2024-08-28 DIAGNOSIS — K02.9 DENTAL CARIES, UNSPECIFIED: ICD-10-CM

## 2024-08-28 PROCEDURE — D0220: CPT

## 2024-08-28 PROCEDURE — T1013: CPT

## 2024-08-28 PROCEDURE — D0140: CPT

## 2024-08-28 PROCEDURE — D7140: CPT

## 2024-08-30 DIAGNOSIS — K02.9 DENTAL CARIES, UNSPECIFIED: ICD-10-CM

## 2024-11-21 ENCOUNTER — OUTPATIENT (OUTPATIENT)
Dept: OUTPATIENT SERVICES | Facility: HOSPITAL | Age: 84
LOS: 1 days | End: 2024-11-21
Payer: COMMERCIAL

## 2024-11-21 DIAGNOSIS — Z01.20 ENCOUNTER FOR DENTAL EXAMINATION AND CLEANING WITHOUT ABNORMAL FINDINGS: ICD-10-CM

## 2024-11-21 PROCEDURE — D0330: CPT

## 2024-11-21 PROCEDURE — D1110: CPT

## 2024-11-21 PROCEDURE — D0150: CPT

## 2024-11-21 PROCEDURE — T1013: CPT

## 2024-11-21 PROCEDURE — D0230: CPT

## 2024-11-29 DIAGNOSIS — K02.9 DENTAL CARIES, UNSPECIFIED: ICD-10-CM

## 2024-12-28 ENCOUNTER — INPATIENT (INPATIENT)
Facility: HOSPITAL | Age: 84
LOS: 0 days | Discharge: HOME | DRG: 194 | End: 2024-12-29
Attending: STUDENT IN AN ORGANIZED HEALTH CARE EDUCATION/TRAINING PROGRAM | Admitting: INTERNAL MEDICINE
Payer: MEDICARE

## 2024-12-28 VITALS
OXYGEN SATURATION: 100 % | RESPIRATION RATE: 18 BRPM | DIASTOLIC BLOOD PRESSURE: 78 MMHG | HEART RATE: 104 BPM | SYSTOLIC BLOOD PRESSURE: 155 MMHG | TEMPERATURE: 102 F | WEIGHT: 115.08 LBS

## 2024-12-28 PROCEDURE — 99285 EMERGENCY DEPT VISIT HI MDM: CPT | Mod: FS

## 2024-12-28 NOTE — ED ADULT TRIAGE NOTE - CHIEF COMPLAINT QUOTE
BIBEMS for confusion  and cough since yesterday. Pt daughter reports 2 falls today. 1 fall unwitnessed, unknown LOC unknown HT. Daughter states " her mattress was full of urine".  in triage

## 2024-12-28 NOTE — ED ADULT TRIAGE NOTE - CADM TRG TX PRIOR TO ARRIVAL
Caller: Elaine Posada    Relationship: Self    Best call back number: [phone]    What is the best time to reach you: ANYTIME    What was the call regarding: PATIENT STATED SHE WOULD LIKE TO HAVE TESTING DONE TO DETERMINE THE TYPE OF SZ'S SHE HAVE AND TO DETERMINE THE CORRECT MEDICATION FOR HER.     SCHEDULED EARLIEST APPT AVAILABLE FOR FOLLOW UP.     PLEASE ADVISE  THANK YOU    none

## 2024-12-29 VITALS
OXYGEN SATURATION: 96 % | SYSTOLIC BLOOD PRESSURE: 131 MMHG | RESPIRATION RATE: 18 BRPM | TEMPERATURE: 99 F | DIASTOLIC BLOOD PRESSURE: 76 MMHG | HEART RATE: 76 BPM

## 2024-12-29 DIAGNOSIS — R41.82 ALTERED MENTAL STATUS, UNSPECIFIED: ICD-10-CM

## 2024-12-29 LAB
ALBUMIN SERPL ELPH-MCNC: 3.9 G/DL — SIGNIFICANT CHANGE UP (ref 3.5–5.2)
ALP SERPL-CCNC: 65 U/L — SIGNIFICANT CHANGE UP (ref 30–115)
ALT FLD-CCNC: 17 U/L — SIGNIFICANT CHANGE UP (ref 0–41)
ANION GAP SERPL CALC-SCNC: 13 MMOL/L — SIGNIFICANT CHANGE UP (ref 7–14)
APPEARANCE UR: CLEAR — SIGNIFICANT CHANGE UP
APTT BLD: 29.8 SEC — SIGNIFICANT CHANGE UP (ref 27–39.2)
AST SERPL-CCNC: 24 U/L — SIGNIFICANT CHANGE UP (ref 0–41)
BACTERIA # UR AUTO: NEGATIVE /HPF — SIGNIFICANT CHANGE UP
BASE EXCESS BLDV CALC-SCNC: -0.5 MMOL/L — SIGNIFICANT CHANGE UP (ref -2–3)
BASOPHILS # BLD AUTO: 0.04 K/UL — SIGNIFICANT CHANGE UP (ref 0–0.2)
BASOPHILS NFR BLD AUTO: 0.7 % — SIGNIFICANT CHANGE UP (ref 0–1)
BILIRUB SERPL-MCNC: 0.5 MG/DL — SIGNIFICANT CHANGE UP (ref 0.2–1.2)
BILIRUB UR-MCNC: NEGATIVE — SIGNIFICANT CHANGE UP
BUN SERPL-MCNC: 15 MG/DL — SIGNIFICANT CHANGE UP (ref 10–20)
CA-I SERPL-SCNC: 1.13 MMOL/L — LOW (ref 1.15–1.33)
CALCIUM SERPL-MCNC: 8.2 MG/DL — LOW (ref 8.4–10.4)
CAST: 0 /LPF — SIGNIFICANT CHANGE UP (ref 0–4)
CHLORIDE SERPL-SCNC: 101 MMOL/L — SIGNIFICANT CHANGE UP (ref 98–110)
CO2 SERPL-SCNC: 22 MMOL/L — SIGNIFICANT CHANGE UP (ref 17–32)
COLOR SPEC: YELLOW — SIGNIFICANT CHANGE UP
CREAT SERPL-MCNC: 0.7 MG/DL — SIGNIFICANT CHANGE UP (ref 0.7–1.5)
DIFF PNL FLD: ABNORMAL
EGFR: 84 ML/MIN/1.73M2 — SIGNIFICANT CHANGE UP
EOSINOPHIL # BLD AUTO: 0.02 K/UL — SIGNIFICANT CHANGE UP (ref 0–0.7)
EOSINOPHIL NFR BLD AUTO: 0.3 % — SIGNIFICANT CHANGE UP (ref 0–8)
FLUAV AG NPH QL: DETECTED
FLUBV AG NPH QL: SIGNIFICANT CHANGE UP
GAS PNL BLDV: 132 MMOL/L — LOW (ref 136–145)
GAS PNL BLDV: SIGNIFICANT CHANGE UP
GAS PNL BLDV: SIGNIFICANT CHANGE UP
GLUCOSE SERPL-MCNC: 107 MG/DL — HIGH (ref 70–99)
GLUCOSE UR QL: NEGATIVE MG/DL — SIGNIFICANT CHANGE UP
HCO3 BLDV-SCNC: 24 MMOL/L — SIGNIFICANT CHANGE UP (ref 22–29)
HCT VFR BLD CALC: 34 % — LOW (ref 37–47)
HCT VFR BLDA CALC: 37 % — SIGNIFICANT CHANGE UP (ref 34.5–46.5)
HGB BLD CALC-MCNC: 12.2 G/DL — SIGNIFICANT CHANGE UP (ref 11.7–16.1)
HGB BLD-MCNC: 11.5 G/DL — LOW (ref 12–16)
IMM GRANULOCYTES NFR BLD AUTO: 0.3 % — SIGNIFICANT CHANGE UP (ref 0.1–0.3)
INR BLD: 1.13 RATIO — SIGNIFICANT CHANGE UP (ref 0.65–1.3)
KETONES UR-MCNC: ABNORMAL MG/DL
LACTATE BLDV-MCNC: 1 MMOL/L — SIGNIFICANT CHANGE UP (ref 0.5–2)
LACTATE SERPL-SCNC: 0.7 MMOL/L — SIGNIFICANT CHANGE UP (ref 0.7–2)
LEUKOCYTE ESTERASE UR-ACNC: NEGATIVE — SIGNIFICANT CHANGE UP
LYMPHOCYTES # BLD AUTO: 0.92 K/UL — LOW (ref 1.2–3.4)
LYMPHOCYTES # BLD AUTO: 15.2 % — LOW (ref 20.5–51.1)
MCHC RBC-ENTMCNC: 32 PG — HIGH (ref 27–31)
MCHC RBC-ENTMCNC: 33.8 G/DL — SIGNIFICANT CHANGE UP (ref 32–37)
MCV RBC AUTO: 94.7 FL — SIGNIFICANT CHANGE UP (ref 81–99)
MONOCYTES # BLD AUTO: 0.73 K/UL — HIGH (ref 0.1–0.6)
MONOCYTES NFR BLD AUTO: 12.1 % — HIGH (ref 1.7–9.3)
NEUTROPHILS # BLD AUTO: 4.32 K/UL — SIGNIFICANT CHANGE UP (ref 1.4–6.5)
NEUTROPHILS NFR BLD AUTO: 71.4 % — SIGNIFICANT CHANGE UP (ref 42.2–75.2)
NITRITE UR-MCNC: NEGATIVE — SIGNIFICANT CHANGE UP
NRBC # BLD: 0 /100 WBCS — SIGNIFICANT CHANGE UP (ref 0–0)
PCO2 BLDV: 39 MMHG — SIGNIFICANT CHANGE UP (ref 39–42)
PH BLDV: 7.4 — SIGNIFICANT CHANGE UP (ref 7.32–7.43)
PH UR: 6.5 — SIGNIFICANT CHANGE UP (ref 5–8)
PLATELET # BLD AUTO: 134 K/UL — SIGNIFICANT CHANGE UP (ref 130–400)
PMV BLD: 10.5 FL — HIGH (ref 7.4–10.4)
PO2 BLDV: 46 MMHG — HIGH (ref 25–45)
POTASSIUM BLDV-SCNC: 3.8 MMOL/L — SIGNIFICANT CHANGE UP (ref 3.5–5.1)
POTASSIUM SERPL-MCNC: 4 MMOL/L — SIGNIFICANT CHANGE UP (ref 3.5–5)
POTASSIUM SERPL-SCNC: 4 MMOL/L — SIGNIFICANT CHANGE UP (ref 3.5–5)
PROT SERPL-MCNC: 6.6 G/DL — SIGNIFICANT CHANGE UP (ref 6–8)
PROT UR-MCNC: 30 MG/DL
PROTHROM AB SERPL-ACNC: 13.4 SEC — HIGH (ref 9.95–12.87)
RBC # BLD: 3.59 M/UL — LOW (ref 4.2–5.4)
RBC # FLD: 14.2 % — SIGNIFICANT CHANGE UP (ref 11.5–14.5)
RBC CASTS # UR COMP ASSIST: 4 /HPF — SIGNIFICANT CHANGE UP (ref 0–4)
RSV RNA NPH QL NAA+NON-PROBE: SIGNIFICANT CHANGE UP
SAO2 % BLDV: 78.5 % — SIGNIFICANT CHANGE UP (ref 67–88)
SARS-COV-2 RNA SPEC QL NAA+PROBE: SIGNIFICANT CHANGE UP
SODIUM SERPL-SCNC: 136 MMOL/L — SIGNIFICANT CHANGE UP (ref 135–146)
SP GR SPEC: >1.03 — HIGH (ref 1–1.03)
SQUAMOUS # UR AUTO: 0 /HPF — SIGNIFICANT CHANGE UP (ref 0–5)
TROPONIN T, HIGH SENSITIVITY RESULT: 25 NG/L — HIGH (ref 6–13)
UROBILINOGEN FLD QL: 1 MG/DL — SIGNIFICANT CHANGE UP (ref 0.2–1)
WBC # BLD: 6.05 K/UL — SIGNIFICANT CHANGE UP (ref 4.8–10.8)
WBC # FLD AUTO: 6.05 K/UL — SIGNIFICANT CHANGE UP (ref 4.8–10.8)
WBC UR QL: 2 /HPF — SIGNIFICANT CHANGE UP (ref 0–5)

## 2024-12-29 PROCEDURE — 97162 PT EVAL MOD COMPLEX 30 MIN: CPT | Mod: GP

## 2024-12-29 PROCEDURE — 84484 ASSAY OF TROPONIN QUANT: CPT

## 2024-12-29 PROCEDURE — 93010 ELECTROCARDIOGRAM REPORT: CPT

## 2024-12-29 PROCEDURE — 74177 CT ABD & PELVIS W/CONTRAST: CPT | Mod: 26,MC

## 2024-12-29 PROCEDURE — 71260 CT THORAX DX C+: CPT | Mod: 26,MC

## 2024-12-29 PROCEDURE — 36415 COLL VENOUS BLD VENIPUNCTURE: CPT

## 2024-12-29 PROCEDURE — 99231 SBSQ HOSP IP/OBS SF/LOW 25: CPT

## 2024-12-29 PROCEDURE — 71045 X-RAY EXAM CHEST 1 VIEW: CPT | Mod: 26

## 2024-12-29 PROCEDURE — 70450 CT HEAD/BRAIN W/O DYE: CPT | Mod: 26,MC

## 2024-12-29 PROCEDURE — 72125 CT NECK SPINE W/O DYE: CPT | Mod: 26,MC

## 2024-12-29 RX ORDER — AZITHROMYCIN MONOHYDRATE 200 MG/5ML
500 POWDER, FOR SUSPENSION ORAL EVERY 24 HOURS
Refills: 0 | Status: DISCONTINUED | OUTPATIENT
Start: 2024-12-30 | End: 2024-12-29

## 2024-12-29 RX ORDER — AZITHROMYCIN MONOHYDRATE 200 MG/5ML
POWDER, FOR SUSPENSION ORAL
Refills: 0 | Status: DISCONTINUED | OUTPATIENT
Start: 2024-12-29 | End: 2024-12-29

## 2024-12-29 RX ORDER — ENOXAPARIN SODIUM 60 MG/.6ML
40 INJECTION INTRAVENOUS; SUBCUTANEOUS EVERY 24 HOURS
Refills: 0 | Status: DISCONTINUED | OUTPATIENT
Start: 2024-12-29 | End: 2024-12-29

## 2024-12-29 RX ORDER — AZITHROMYCIN MONOHYDRATE 200 MG/5ML
500 POWDER, FOR SUSPENSION ORAL ONCE
Refills: 0 | Status: COMPLETED | OUTPATIENT
Start: 2024-12-29 | End: 2024-12-29

## 2024-12-29 RX ORDER — MONTELUKAST SODIUM 10 MG/1
1 TABLET, FILM COATED ORAL
Refills: 0 | DISCHARGE

## 2024-12-29 RX ORDER — GUAIFENESIN 100 MG/5ML
1200 SYRUP ORAL EVERY 12 HOURS
Refills: 0 | Status: DISCONTINUED | OUTPATIENT
Start: 2024-12-29 | End: 2024-12-29

## 2024-12-29 RX ORDER — IPRATROPIUM BROMIDE AND ALBUTEROL SULFATE .5; 2.5 MG/3ML; MG/3ML
3 SOLUTION RESPIRATORY (INHALATION) EVERY 4 HOURS
Refills: 0 | Status: DISCONTINUED | OUTPATIENT
Start: 2024-12-29 | End: 2024-12-29

## 2024-12-29 RX ORDER — PREDNISONE 5 MG
2 TABLET ORAL
Qty: 8 | Refills: 0
Start: 2024-12-29 | End: 2025-01-01

## 2024-12-29 RX ORDER — OSELTAMIVIR 75 MG/1
1 CAPSULE ORAL
Qty: 10 | Refills: 0
Start: 2024-12-29 | End: 2025-01-02

## 2024-12-29 RX ORDER — IPRATROPIUM BROMIDE AND ALBUTEROL SULFATE .5; 2.5 MG/3ML; MG/3ML
3 SOLUTION RESPIRATORY (INHALATION) EVERY 6 HOURS
Refills: 0 | Status: DISCONTINUED | OUTPATIENT
Start: 2024-12-29 | End: 2024-12-29

## 2024-12-29 RX ORDER — PANTOPRAZOLE 40 MG/1
1 TABLET, DELAYED RELEASE ORAL
Refills: 0 | DISCHARGE

## 2024-12-29 RX ORDER — FUROSEMIDE 20 MG
40 TABLET ORAL DAILY
Refills: 0 | Status: DISCONTINUED | OUTPATIENT
Start: 2024-12-30 | End: 2024-12-29

## 2024-12-29 RX ORDER — FUROSEMIDE 20 MG
1 TABLET ORAL
Qty: 14 | Refills: 0
Start: 2024-12-29 | End: 2025-01-11

## 2024-12-29 RX ORDER — SODIUM CHLORIDE 9 MG/ML
1600 INJECTION, SOLUTION INTRAVENOUS ONCE
Refills: 0 | Status: DISCONTINUED | OUTPATIENT
Start: 2024-12-29 | End: 2024-12-29

## 2024-12-29 RX ORDER — EZETIMIBE 10 MG/1
1 TABLET ORAL
Refills: 0 | DISCHARGE

## 2024-12-29 RX ORDER — ICOSAPENT ETHYL 1 G/1
2 CAPSULE ORAL
Refills: 0 | DISCHARGE

## 2024-12-29 RX ORDER — SODIUM CHLORIDE 9 MG/ML
250 INJECTION, SOLUTION INTRAVENOUS ONCE
Refills: 0 | Status: COMPLETED | OUTPATIENT
Start: 2024-12-29 | End: 2024-12-29

## 2024-12-29 RX ORDER — OSELTAMIVIR 75 MG/1
75 CAPSULE ORAL
Refills: 0 | Status: DISCONTINUED | OUTPATIENT
Start: 2024-12-29 | End: 2024-12-29

## 2024-12-29 RX ORDER — IPRATROPIUM BROMIDE AND ALBUTEROL SULFATE .5; 2.5 MG/3ML; MG/3ML
3 SOLUTION RESPIRATORY (INHALATION)
Qty: 360 | Refills: 0
Start: 2024-12-29 | End: 2025-01-27

## 2024-12-29 RX ORDER — POLYETHYLENE GLYCOL 3350 17 G/DOSE
17 POWDER (GRAM) ORAL
Refills: 0 | Status: DISCONTINUED | OUTPATIENT
Start: 2024-12-29 | End: 2024-12-29

## 2024-12-29 RX ORDER — METHYLPREDNISOLONE 4 MG/1
60 TABLET ORAL EVERY 12 HOURS
Refills: 0 | Status: DISCONTINUED | OUTPATIENT
Start: 2024-12-29 | End: 2024-12-29

## 2024-12-29 RX ORDER — PANTOPRAZOLE 40 MG/1
40 TABLET, DELAYED RELEASE ORAL
Refills: 0 | Status: DISCONTINUED | OUTPATIENT
Start: 2024-12-29 | End: 2024-12-29

## 2024-12-29 RX ORDER — RAMIPRIL 5 MG/1
1 CAPSULE ORAL
Qty: 0 | Refills: 0 | DISCHARGE

## 2024-12-29 RX ORDER — MONTELUKAST SODIUM 10 MG/1
10 TABLET, FILM COATED ORAL DAILY
Refills: 0 | Status: DISCONTINUED | OUTPATIENT
Start: 2024-12-29 | End: 2024-12-29

## 2024-12-29 RX ORDER — ACETAMINOPHEN 80 MG/.8ML
650 SOLUTION/ DROPS ORAL ONCE
Refills: 0 | Status: COMPLETED | OUTPATIENT
Start: 2024-12-29 | End: 2024-12-29

## 2024-12-29 RX ORDER — FLUTICASONE PROPIONATE AND SALMETEROL 50; 500 UG/1; UG/1
1 POWDER ORAL; RESPIRATORY (INHALATION)
Refills: 0 | Status: DISCONTINUED | OUTPATIENT
Start: 2024-12-29 | End: 2024-12-29

## 2024-12-29 RX ORDER — EZETIMIBE 10 MG/1
10 TABLET ORAL DAILY
Refills: 0 | Status: DISCONTINUED | OUTPATIENT
Start: 2024-12-29 | End: 2024-12-29

## 2024-12-29 RX ADMIN — ACETAMINOPHEN 650 MILLIGRAM(S): 80 SOLUTION/ DROPS ORAL at 02:49

## 2024-12-29 RX ADMIN — EZETIMIBE 10 MILLIGRAM(S): 10 TABLET ORAL at 12:17

## 2024-12-29 RX ADMIN — IPRATROPIUM BROMIDE AND ALBUTEROL SULFATE 3 MILLILITER(S): .5; 2.5 SOLUTION RESPIRATORY (INHALATION) at 14:14

## 2024-12-29 RX ADMIN — Medication 1200 MILLIGRAM(S): at 12:09

## 2024-12-29 RX ADMIN — AZITHROMYCIN MONOHYDRATE 500 MILLIGRAM(S): 200 POWDER, FOR SUSPENSION ORAL at 10:00

## 2024-12-29 RX ADMIN — MONTELUKAST SODIUM 10 MILLIGRAM(S): 10 TABLET, FILM COATED ORAL at 12:10

## 2024-12-29 RX ADMIN — METHYLPREDNISOLONE 60 MILLIGRAM(S): 4 TABLET ORAL at 12:09

## 2024-12-29 RX ADMIN — OSELTAMIVIR 75 MILLIGRAM(S): 75 CAPSULE ORAL at 12:09

## 2024-12-29 RX ADMIN — SODIUM CHLORIDE 250 MILLILITER(S): 9 INJECTION, SOLUTION INTRAVENOUS at 07:03

## 2024-12-29 NOTE — H&P ADULT - HISTORY OF PRESENT ILLNESS
84-year-old female PMH:   - Severe mitral valve regurgitation  - Severe pulmonary hypertension  - asthma/COPD  - HTN,   - hx of hysterectomy     who presents ED brought in accompanied by her daughter for concern of confusion that began today and fever.      Patient's daughter reports that patient is typically AO x 3 at baseline, lives at home with home health aide services for 6 and half hours every day.      Patient's daughter noticed that patient was saying things that were not making sense and was restless, was seen on the camera at home walking around in the middle night.      Patient's home health aide noticed that patient had a cough earlier today and went to the drugstore to get her medication, when she came home she found the patient on the floor awake and responsive.      Of note, patient was also found soaked in her urine which patient's daughter says is very uncharacteristic of her.      Patient is Unsure of why she fell; no preceded chest pain, palpitation, headache.    Denies nausea, vomiting, CP, SOB, abdominal pain, or urinary symptoms.      Patient had a positive sick contact on [facility] (daughter with cold). Patient received the flu shot in october 2024.     As per the daughter (who is a nurse), patient has severe MR and at [facility] they tried to perform a mitral clip however it was unsuccessful and the cardiologist told her that she has 2 years to live (this was 18 Months ago).   Daughter reports that patient is doing fine, walks around with a walker. Takes care of herself. and overall she is in good shape.   Follow with Dr Janette Person    In the ED   Vitals on presentation   · BP Systolic	155 mm Hg  · BP Diastolic	78 mm Hg  · Respiration Rate (breaths/min)	18 /min  · Temp (F)	 102 Degrees F  · SpO2 (%)	100 % room air        CT C-A-P:   No evidence of acute traumatic thoracic or abdominal pathology.  Foci of intraluminal air in the urinary bladder, which may be related to recent instrumentation versus bladder infection; correlation with urinalysis suggested.    CT HEAD:  No acute intracranial findings.  CT CERVICAL SPINE:  No acute cervical fracture or facet subluxation.

## 2024-12-29 NOTE — DISCHARGE NOTE PROVIDER - NSDCMRMEDTOKEN_GEN_ALL_CORE_FT
Albuterol (Eqv-ProAir HFA) 90 mcg/inh inhalation aerosol: 2 puff(s) inhaled every 6 hours, As Needed  Breo Ellipta 100 mcg-25 mcg/inh inhalation powder: 1 puff(s) inhaled once a day  ezetimibe 10 mg oral tablet: 1 tab(s) orally once a day  Icosapent Ethyl 1 g oral capsule: 2 cap(s) orally 2 times a day  ipratropium-albuterol 0.5 mg-2.5 mg/3 mL inhalation solution: 3 milliliter(s) by nebulizer every 6 hours as needed for  shortness of breath and/or wheezing  meclizine 25 mg oral tablet: 0.5 tab(s) orally 2 times a day as needed for  dizziness  montelukast 10 mg oral tablet: 1 tab(s) orally once a day  predniSONE 20 mg oral tablet: 2 tab(s) orally once a day  Protonix 40 mg oral delayed release tablet: 1 tab(s) orally once a day  ramipril 5 mg oral capsule: 1 cap(s) orally once a day hold it for 3 days as with flu infection the blood pressure might drop  Tamiflu 75 mg oral capsule: 1 cap(s) orally 2 times a day

## 2024-12-29 NOTE — GOALS OF CARE CONVERSATION - ADVANCED CARE PLANNING - CONVERSATION DETAILS
Introduced concept of goals of care with patient's daughter (HCP and she is a nurse) at bedside.   Discussed concepts of full medical management, limited medical care, and comfort measures.   Explained concept of "full code" including need for compressions if patient were to arrest and need for intubation if patient experiencing severe respiratory distress or inability to protect airway.   Additionally, explained concepts of DNR and DNI to her as well.   Pt's daughter expressed understanding of all concepts.     At this time, they would like code status to be: DNR DNI with trial of NIV

## 2024-12-29 NOTE — ED PROVIDER NOTE - ATTENDING APP SHARED VISIT CONTRIBUTION OF CARE
84-year-old female with a past medical history significant for mitral valve regurgitation pulmonary hypertension asthma hypertension who presents with change in mental status.  As per daughter since yesterday patient has been weak and has been having a fever at home.  Of note patient did have an unwitnessed fall today as per daughter.  Daughter also states that she has episodes of confusion and noticed that her mother had an episode of urinary incontinence.  Patient did have possible recent sick contacts.  Patient denies any chest pain shortness of breath or any other medical complaints.    VITAL SIGNS: I have reviewed nursing notes and confirm.  CONSTITUTIONAL: non-toxic, well appearing  SKIN: no rash, no petechiae.  EYES: EOMI, pink conjunctiva, anicteric  ENT: tongue midline, no exudates, MMM  NECK: Supple; no meningismus, no JVD  CARD: RRR, no murmurs, equal radial pulses bilaterally 2+  RESP: CTAB, no respiratory distress  ABD: Soft, non-tender, non-distended, no peritoneal signs, no HSM, no CVA tenderness  EXT: Normal ROM x4. No edema. No calves tenderness  NEURO: Alert, oriented x3. CN2-12 intact, equal strength bilaterally    84-year-old female who presents with confusion noted to be febrile concern for viral illness versus urosepsis.  Will obtain labs imaging UA IV fluids reassess dispo pending

## 2024-12-29 NOTE — DISCHARGE NOTE PROVIDER - HOSPITAL COURSE
84 y.o lady admitted for COPD/asthma exacerbation secondary to influenza A infection    # SIRS poa, Influenza A infection  #Acute COPD/asthma Exacerbation   #Fall with urinary incontinence  #Severe MR / Severe pulmonary hypertension  #HTN      PLANs:    - on RA but some exp wheezing on exam   - pt and her daughters wants to leave today which ok but advsied franki get PT eval, they refused, pt has wheele chair at home   - Symbicort 2 puffs BID (standing) and Duonebs Q4H (Standing); Stop standing dose when appropriate  - Duonebs PRN  - azithromycin  - oseltamivir   - trauma workup negative   - c/w lasix 40 mg OD   - DVT pro: lovenox  - Code status: DNR/DNI w trial of NIV

## 2024-12-29 NOTE — DISCHARGE NOTE PROVIDER - ATTENDING DISCHARGE PHYSICAL EXAMINATION:
CONSTITUTIONAL: No acute distress, AOx3  HEAD: Atraumatic, normocephalic  EYES: EOM intact, PERRLA, conjunctiva and sclera clear  ENT: Supple, no masses, no thyromegaly, no bruits, no JVD; moist mucous membranes  PULMONARY: Clear to auscultation bilaterally; +ve exp wheezes, no rales or rhonchi  CARDIOVASCULAR: Regular rate and rhythm; no murmurs, rubs, or gallops  GASTROINTESTINAL: Soft, non-tender, non-distended; bowel sounds present  MUSCULOSKELETAL: 2+ peripheral pulses; no clubbing, no cyanosis, no edema  NEUROLOGY: AAO x3, moves all exts, non-focal  SKIN: No rashes or lesions; warm and dry

## 2024-12-29 NOTE — PHYSICAL THERAPY INITIAL EVALUATION ADULT - GENERAL OBSERVATIONS, REHAB EVAL
3:49-4:13pm Pt encountered supine in bed, A & O x 3 in NAD, no c/o pain and agreeable to PT. Pt requires CGA in bed mobility and sit/stand transfer. Pt ambulated 75 ft CGA using RW with decreased lorenzo, decreased stride length with minimal gait instability secondary to weakness. Pt reports fatigue after ambulation and left in bed as found, NAD, RN Orville aware. Pt will benefit from skilled PT 3-5x/wk for thera ex, functional mobility training, balance activity and gait training to improve mobility and return to previous level of function.

## 2024-12-29 NOTE — ED PROVIDER NOTE - CLINICAL SUMMARY MEDICAL DECISION MAKING FREE TEXT BOX
84-year-old female who presents with confusion noted to be febrile concern for viral illness versus urosepsis.  Will obtain labs imaging UA IV fluids. Labs  were ordered and reviewed.  Imaging was ordered and reviewed by me.  Appropriate medications for patient's presenting complaints were ordered and effects were reassessed.  Patient's records (prior hospital, ED visit, and/or nursing home notes if available) were reviewed.  Additional history was obtained from EMS, family, and/or PCP (where available).  Escalation to admission/observation was considered.  Patient requires inpatient hospitalization - medicine

## 2024-12-29 NOTE — ED ADULT NURSE NOTE - NSFALLRISKINTERV_ED_ALL_ED
Assistance OOB with selected safe patient handling equipment if applicable/Assistance with ambulation/Communicate fall risk and risk factors to all staff, patient, and family/Monitor gait and stability/Monitor for mental status changes and reorient to person, place, and time, as needed/Provide visual cue: yellow wristband, yellow gown, etc/Reinforce activity limits and safety measures with patient and family/Toileting schedule using arm’s reach rule for commode and bathroom/Use of alarms - bed, stretcher, chair and/or video monitoring/Call bell, personal items and telephone in reach/Instruct patient to call for assistance before getting out of bed/chair/stretcher/Non-slip footwear applied when patient is off stretcher/[place] to call system/Physically safe environment - no spills, clutter or unnecessary equipment/Purposeful Proactive Rounding/Room/bathroom lighting operational, light cord in reach

## 2024-12-29 NOTE — DISCHARGE NOTE PROVIDER - NSDCCPCAREPLAN_GEN_ALL_CORE_FT
PRINCIPAL DISCHARGE DIAGNOSIS  Diagnosis: Influenza A  Assessment and Plan of Treatment: your weakness and fall due to influenza, you laso have wheezing on exam,  you declined physical therpay evaluation, will discharge you with Tamiflu and prednisone  if you feel shortness of breath you need to get urgent medical evaluation      SECONDARY DISCHARGE DIAGNOSES  Diagnosis: Influenza A  Assessment and Plan of Treatment:

## 2024-12-29 NOTE — DISCHARGE NOTE PROVIDER - CARE PROVIDER_API CALL
Adán Websteranor  Internal Medicine  ASAD MASSEY, Phys,    Phone: ([phone]  Fax: ([phone]  Follow Up Time: 2 weeks

## 2024-12-29 NOTE — DISCHARGE NOTE NURSING/CASE MANAGEMENT/SOCIAL WORK - PATIENT PORTAL LINK FT
You can access the FollowMyHealth Patient Portal offered by [facility] by registering at the following website: [email or web address]. By joining [facility]’s FollowMyHealth portal, you will also be able to view your health information using other applications (apps) compatible with our system.

## 2024-12-29 NOTE — ED PROVIDER NOTE - PHYSICAL EXAMINATION
VITAL SIGNS: I have reviewed nursing notes and confirm.  CONSTITUTIONAL: In no acute distress.  SKIN: Skin exam is warm and dry.  HEAD: Normocephalic; atraumatic.  EYES: PERRL, EOM intact; conjunctiva and sclera clear.  ENT: No nasal discharge; airway clear.   NECK: Supple; non tender.  CARD: S1, S2; Regular rate and rhythm.  RESP: Equal air entry BL. Speaking in full sentences.   ABD: Normal bowel sounds; soft; non-distended; non-tender; No rebound or guarding. No CVA tenderness.  EXT: Normal ROM. No LE edema. No extremity tenderness.   NEURO: Alert, oriented. Grossly unremarkable. No focal deficits.

## 2024-12-29 NOTE — DISCHARGE NOTE PROVIDER - NSDCFUSCHEDAPPT_GEN_ALL_CORE_FT
[facility] Physician [facility]  [address]  Scheduled Appointment: 02/19/2025

## 2024-12-29 NOTE — H&P ADULT - NSHPPHYSICALEXAM_GEN_ALL_CORE
LOS:     VITALS:   T(C): 36.5 (12-29-24 @ 07:04), Max: 38.9 (12-28-24 @ 23:49)  HR: 60 (12-29-24 @ 07:04) (60 - 104)  BP: 112/69 (12-29-24 @ 07:04) (112/69 - 155/78)  RR: 18 (12-29-24 @ 07:04) (18 - 18)  SpO2: 96% (12-29-24 @ 07:04) (96% - 100%)    GENERAL: NAD, lying in bed comfortably  HEAD:  Atraumatic, Normocephalic  EYES: EOMI, PERRLA, conjunctiva and sclera clear  ENT: Moist mucous membranes  NECK: Supple, No JVD  CHEST/LUNG: diffuse wheezing. Unlabored respirations. on room air  HEART: Regular rate and rhythm; unable to assess the murmurs given the diffuse wheezing  ABDOMEN: BSx4; Soft, nontender, nondistended  EXTREMITIES:  2+ Peripheral Pulses, brisk capillary refill. No clubbing, cyanosis, or edema  NERVOUS SYSTEM:  A&Ox3, no focal deficits   SKIN: No rashes or lesions LOS:     VITALS:   T(C): 36.5 (12-29-24 @ 07:04), Max: 38.9 (12-28-24 @ 23:49)  HR: 60 (12-29-24 @ 07:04) (60 - 104)  BP: 112/69 (12-29-24 @ 07:04) (112/69 - 155/78)  RR: 18 (12-29-24 @ 07:04) (18 - 18)  SpO2: 96% (12-29-24 @ 07:04) (96% - 100%)    GENERAL: old lady not in distress, lying in bed comfortably  HEAD:  Atraumatic, Normocephalic  EYES: EOMI, PERRLA, conjunctiva and sclera clear  ENT: Moist mucous membranes  NECK: Supple, No JVD  CHEST/LUNG: +ve exp wheezing. Unlabored respirations. no Resp distress   HEART: Regular rate and rhythm; unable to assess the murmurs given the diffuse wheezing  ABDOMEN: BSx4; Soft, nontender, nondistended  EXTREMITIES:  2+ Peripheral Pulses, brisk capillary refill. No clubbing, cyanosis, or edema  NERVOUS SYSTEM:  A&Ox3, no focal deficits   SKIN: No rashes or lesions

## 2024-12-29 NOTE — H&P ADULT - ASSESSMENT
84-year-old female PMH:   -mitral valve regurgitation,   - pulmonary hypertension,   - asthma,   - HTN,     who presents ED brought in accompanied by her daughter for concern of confusion that began today and fever.      Patient's daughter reports that patient is typically AO x 3 at baseline, lives at home with home health aide services for 6 and half hours every day.      Patient's daughter noticed that patient was saying things that were not making sense and was restless, was seen on the camera at home walking around in the middle night.      Patient's home health aide noticed that patient had a cough earlier today and went to the drugstore to get her medication, when she came home she found the patient on the floor awake and responsive.      Of note, patient was also found soaked in her urine which patient's daughter says is very uncharacteristic of her.      Unsure as to how the patient fell, if there was LOC, or head strike.      Denies nausea, vomiting, CP, SOB, abdominal pain, or urinary symptoms.          In the ED   Vitals on presentation           #Influenza A infection      #Fall             #Diet:   #DVT pro:   #GI pro: pantoprazole  #Activity: as tolerated  #Dispo:   #Med rec:   #Code status:          84 y.o lady admitted for COPD/asthma exacerbation secondary to influenza A infection      #COPD/asthma Exacerbation secondary to Influenza A infection  #Immunosuppression secondary to age and co-morbidities  - trigger: Flu A infection  - VBG on admission: pH 7.4 // pCO2 39  - CT on admission: Central tracheobronchial tree without debris. No focal consolidation, pleural effusion, or pneumothorax. Bilateral subsegmental atelectasis.  - saturating well on room air   - sick contact  - received the flu shot in october  - takes Breo at home    - Start Solumedrol 60mg IV Q12H; Taper to PO Prednisone with improved wheezing  - Start Symbicort 2 puffs BID (standing) and Duonebs Q4H (Standing); Stop standing dose when appropriate  - Start Duonebs PRN  - azithromycin for the COPD exacerbation (no PNA clinically or radiologically)  - oseltamivir   - guaifenesin     #Fall with urinary incontinence  - trauma workup negative   - likely secondary to the infection  - ECG no ischemic changes   - f up trop   - check orthostatics     #Severe MR  #Severe pulmonary hypertension  #HTN  - As per the daughter (who is a nurse), patient has severe MR and at [facility] they tried to perform a mitral clip however it was unsuccessful and the cardiologist told her that she has 2 years to live (this was 18 Months ago).   - Follow with Dr Janette Person  - patient's daughter is asking for cardiology consult; told her that it is not urgent as there's no active cardiac issues.   - get the consult on Monday.   - patient is not in HF exacerbation  - c/w lasix 40 mg OD starting tomorrow  - hold the ramipril and resume as needed    #Colonic diverticulosis  - seen on CT   - bowel regimen   - avoid constipation    #Hx of kyphoplasty of the L1 vertebral body.   - Stable   - monitor      #Diet: DASH w fluid restriction   #DVT pro: lovenox  #GI pro: pantoprazole  #Activity: as tolerated  #Dispo: med  #Med rec: done  #Code status: DNR/DNI w trial of NIV

## 2024-12-29 NOTE — ED PROVIDER NOTE - OBJECTIVE STATEMENT
Patient is an 84-year-old female PMH mitral valve regurgitation, pulmonary hypertension, asthma, HTN, who presents ED brought in accompanied by her daughter for concern of confusion that began today and fever.  Patient's daughter reports that patient is typically ANO x 3 at baseline, lives at home with home health aide services for 6 and half hours every day.  Patient's daughter noticed that patient was saying things that were not making sense and was restless, was seen on the camera at home walking around in the middle night.  Patient's home health aide noticed that patient had a cough earlier today and went to the drugstore to get her medication, when she came home she found the patient on the floor awake and responsive.  Of note, patient was also found soaked in her urine which patient's daughter says is very uncharacteristic of her.  Unsure as to how the patient fell, if there was LOC, or head strike.  Denies nausea, vomiting, CP, SOB, abdominal pain, or urinary symptoms.

## 2024-12-29 NOTE — DISCHARGE NOTE NURSING/CASE MANAGEMENT/SOCIAL WORK - FINANCIAL ASSISTANCE
[facility] provides services at a reduced cost to those who are determined to be eligible through [facility]’s financial assistance program. Information regarding [facility]’s financial assistance program can be found by going to [email or web address] or by calling [phone].

## 2024-12-29 NOTE — H&P ADULT - ATTENDING COMMENTS
84 y.o lady admitted for COPD/asthma exacerbation secondary to influenza A infection    # SIRS poa, Influenza A infection  #Acute COPD/asthma Exacerbation   #Fall with urinary incontinence  #Severe MR / Severe pulmonary hypertension  #HTN      PLANs:    - on RA but has wheezing on exam   - Start Solumedrol 60mg IV Q12H;   - Start Symbicort 2 puffs BID (standing) and Duonebs Q4H (Standing); Stop standing dose when appropriate  - Start Duonebs PRN  - azithromycin for the COPD exacerbation (no PNA clinically or radiologically)  - oseltamivir   - trauma workup negative   - c/w lasix 40 mg OD   - hold the ramipril and resume as needed  - DVT pro: lovenox  - Code status: DNR/DNI w trial of NIV

## 2024-12-30 LAB
CULTURE RESULTS: SIGNIFICANT CHANGE UP
SPECIMEN SOURCE: SIGNIFICANT CHANGE UP

## 2025-01-06 DIAGNOSIS — I27.20 PULMONARY HYPERTENSION, UNSPECIFIED: ICD-10-CM

## 2025-01-06 DIAGNOSIS — K57.30 DIVERTICULOSIS OF LARGE INTESTINE WITHOUT PERFORATION OR ABSCESS WITHOUT BLEEDING: ICD-10-CM

## 2025-01-06 DIAGNOSIS — J45.901 UNSPECIFIED ASTHMA WITH (ACUTE) EXACERBATION: ICD-10-CM

## 2025-01-06 DIAGNOSIS — J45.909 UNSPECIFIED ASTHMA, UNCOMPLICATED: ICD-10-CM

## 2025-01-06 DIAGNOSIS — J44.1 CHRONIC OBSTRUCTIVE PULMONARY DISEASE WITH (ACUTE) EXACERBATION: ICD-10-CM

## 2025-01-06 DIAGNOSIS — I34.0 NONRHEUMATIC MITRAL (VALVE) INSUFFICIENCY: ICD-10-CM

## 2025-01-06 DIAGNOSIS — J10.1 INFLUENZA DUE TO OTHER IDENTIFIED INFLUENZA VIRUS WITH OTHER RESPIRATORY MANIFESTATIONS: ICD-10-CM

## 2025-01-06 DIAGNOSIS — I10 ESSENTIAL (PRIMARY) HYPERTENSION: ICD-10-CM

## 2025-01-06 DIAGNOSIS — D84.9 IMMUNODEFICIENCY, UNSPECIFIED: ICD-10-CM

## 2025-01-06 DIAGNOSIS — R41.82 ALTERED MENTAL STATUS, UNSPECIFIED: ICD-10-CM

## 2025-01-06 DIAGNOSIS — R65.10 SYSTEMIC INFLAMMATORY RESPONSE SYNDROME (SIRS) OF NON-INFECTIOUS ORIGIN WITHOUT ACUTE ORGAN DYSFUNCTION: ICD-10-CM

## 2025-02-19 ENCOUNTER — APPOINTMENT (OUTPATIENT)
Dept: ORTHOPEDIC SURGERY | Facility: CLINIC | Age: 85
End: 2025-02-19

## 2025-02-27 ENCOUNTER — OUTPATIENT (OUTPATIENT)
Dept: OUTPATIENT SERVICES | Facility: HOSPITAL | Age: 85
LOS: 1 days | End: 2025-02-27
Payer: COMMERCIAL

## 2025-02-27 DIAGNOSIS — K08.409 PARTIAL LOSS OF TEETH, UNSPECIFIED CAUSE, UNSPECIFIED CLASS: ICD-10-CM

## 2025-02-27 PROCEDURE — T1013: CPT

## 2025-02-27 PROCEDURE — D2391: CPT

## 2025-02-28 DIAGNOSIS — K08.409 PARTIAL LOSS OF TEETH, UNSPECIFIED CAUSE, UNSPECIFIED CLASS: ICD-10-CM

## 2025-03-04 ENCOUNTER — APPOINTMENT (OUTPATIENT)
Facility: CLINIC | Age: 85
End: 2025-03-04
Payer: MEDICARE

## 2025-03-04 DIAGNOSIS — M17.0 BILATERAL PRIMARY OSTEOARTHRITIS OF KNEE: ICD-10-CM

## 2025-03-04 PROCEDURE — 20610 DRAIN/INJ JOINT/BURSA W/O US: CPT | Mod: RT

## 2025-03-04 PROCEDURE — 99213 OFFICE O/P EST LOW 20 MIN: CPT | Mod: 25

## 2025-04-15 ENCOUNTER — APPOINTMENT (OUTPATIENT)
Facility: CLINIC | Age: 85
End: 2025-04-15

## 2025-04-15 ENCOUNTER — APPOINTMENT (OUTPATIENT)
Dept: NEUROLOGY | Facility: CLINIC | Age: 85
End: 2025-04-15

## 2025-04-17 ENCOUNTER — OUTPATIENT (OUTPATIENT)
Dept: OUTPATIENT SERVICES | Facility: HOSPITAL | Age: 85
LOS: 1 days | End: 2025-04-17

## 2025-04-17 DIAGNOSIS — K08.409 PARTIAL LOSS OF TEETH, UNSPECIFIED CAUSE, UNSPECIFIED CLASS: ICD-10-CM

## 2025-04-22 ENCOUNTER — APPOINTMENT (OUTPATIENT)
Facility: CLINIC | Age: 85
End: 2025-04-22
Payer: MEDICARE

## 2025-04-22 DIAGNOSIS — M17.0 BILATERAL PRIMARY OSTEOARTHRITIS OF KNEE: ICD-10-CM

## 2025-04-22 PROCEDURE — 99213 OFFICE O/P EST LOW 20 MIN: CPT | Mod: 25

## 2025-04-22 PROCEDURE — 20611 DRAIN/INJ JOINT/BURSA W/US: CPT | Mod: 50

## 2025-04-24 ENCOUNTER — INPATIENT (INPATIENT)
Facility: HOSPITAL | Age: 85
LOS: 3 days | Discharge: NURSING, REHAB OR LONG-TERM CARE | DRG: 999 | End: 2025-04-28
Attending: INTERNAL MEDICINE | Admitting: STUDENT IN AN ORGANIZED HEALTH CARE EDUCATION/TRAINING PROGRAM
Payer: MEDICARE

## 2025-04-24 VITALS
RESPIRATION RATE: 18 BRPM | TEMPERATURE: 99 F | DIASTOLIC BLOOD PRESSURE: 88 MMHG | SYSTOLIC BLOOD PRESSURE: 160 MMHG | HEART RATE: 84 BPM | OXYGEN SATURATION: 94 % | WEIGHT: 119.93 LBS

## 2025-04-24 DIAGNOSIS — M79.604 PAIN IN RIGHT LEG: ICD-10-CM

## 2025-04-24 DIAGNOSIS — I27.20 PULMONARY HYPERTENSION, UNSPECIFIED: ICD-10-CM

## 2025-04-24 DIAGNOSIS — J44.89 OTHER SPECIFIED CHRONIC OBSTRUCTIVE PULMONARY DISEASE: ICD-10-CM
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
## 2025-04-24 DIAGNOSIS — I10 ESSENTIAL (PRIMARY) HYPERTENSION: ICD-10-CM

## 2025-04-24 DIAGNOSIS — Z79.52 LONG TERM (CURRENT) USE OF SYSTEMIC STEROIDS: ICD-10-CM

## 2025-04-24 DIAGNOSIS — G92.8 OTHER TOXIC ENCEPHALOPATHY: ICD-10-CM
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
## 2025-04-24 DIAGNOSIS — J45.909 UNSPECIFIED ASTHMA, UNCOMPLICATED: ICD-10-CM

## 2025-04-24 DIAGNOSIS — T43.025A ADVERSE EFFECT OF TETRACYCLIC ANTIDEPRESSANTS, INITIAL ENCOUNTER: ICD-10-CM

## 2025-04-24 DIAGNOSIS — I34.0 NONRHEUMATIC MITRAL (VALVE) INSUFFICIENCY: ICD-10-CM

## 2025-04-24 LAB
ALBUMIN SERPL ELPH-MCNC: 4.4 G/DL — SIGNIFICANT CHANGE UP (ref 3.5–5.2)
ALP SERPL-CCNC: 84 U/L — SIGNIFICANT CHANGE UP (ref 30–115)
ALT FLD-CCNC: 13 U/L — SIGNIFICANT CHANGE UP (ref 0–41)
ANION GAP SERPL CALC-SCNC: 13 MMOL/L — SIGNIFICANT CHANGE UP (ref 7–14)
APTT BLD: 29.8 SEC — SIGNIFICANT CHANGE UP (ref 27–39.2)
AST SERPL-CCNC: 18 U/L — SIGNIFICANT CHANGE UP (ref 0–41)
BASOPHILS # BLD AUTO: 0.05 K/UL — SIGNIFICANT CHANGE UP (ref 0–0.2)
BASOPHILS NFR BLD AUTO: 0.5 % — SIGNIFICANT CHANGE UP (ref 0–1)
BILIRUB SERPL-MCNC: 0.5 MG/DL — SIGNIFICANT CHANGE UP (ref 0.2–1.2)
BUN SERPL-MCNC: 19 MG/DL — SIGNIFICANT CHANGE UP (ref 10–20)
CALCIUM SERPL-MCNC: 9.2 MG/DL — SIGNIFICANT CHANGE UP (ref 8.4–10.5)
CHLORIDE SERPL-SCNC: 101 MMOL/L — SIGNIFICANT CHANGE UP (ref 98–110)
CK SERPL-CCNC: 146 U/L — SIGNIFICANT CHANGE UP (ref 0–225)
CO2 SERPL-SCNC: 26 MMOL/L — SIGNIFICANT CHANGE UP (ref 17–32)
CREAT SERPL-MCNC: 0.8 MG/DL — SIGNIFICANT CHANGE UP (ref 0.7–1.5)
EGFR: 71 ML/MIN/1.73M2 — SIGNIFICANT CHANGE UP
EGFR: 71 ML/MIN/1.73M2 — SIGNIFICANT CHANGE UP
EOSINOPHIL # BLD AUTO: 0.34 K/UL — SIGNIFICANT CHANGE UP (ref 0–0.7)
EOSINOPHIL NFR BLD AUTO: 3.1 % — SIGNIFICANT CHANGE UP (ref 0–8)
GLUCOSE SERPL-MCNC: 85 MG/DL — SIGNIFICANT CHANGE UP (ref 70–99)
HCT VFR BLD CALC: 35.2 % — LOW (ref 37–47)
HGB BLD-MCNC: 12 G/DL — SIGNIFICANT CHANGE UP (ref 12–16)
IMM GRANULOCYTES NFR BLD AUTO: 0.3 % — SIGNIFICANT CHANGE UP (ref 0.1–0.3)
INR BLD: 1.04 RATIO — SIGNIFICANT CHANGE UP (ref 0.65–1.3)
LIDOCAIN IGE QN: 27 U/L — SIGNIFICANT CHANGE UP (ref 7–60)
LYMPHOCYTES # BLD AUTO: 1.52 K/UL — SIGNIFICANT CHANGE UP (ref 1.2–3.4)
LYMPHOCYTES # BLD AUTO: 14 % — LOW (ref 20.5–51.1)
MCHC RBC-ENTMCNC: 31.4 PG — HIGH (ref 27–31)
MCHC RBC-ENTMCNC: 34.1 G/DL — SIGNIFICANT CHANGE UP (ref 32–37)
MCV RBC AUTO: 92.1 FL — SIGNIFICANT CHANGE UP (ref 81–99)
MONOCYTES # BLD AUTO: 1.01 K/UL — HIGH (ref 0.1–0.6)
MONOCYTES NFR BLD AUTO: 9.3 % — SIGNIFICANT CHANGE UP (ref 1.7–9.3)
NEUTROPHILS # BLD AUTO: 7.88 K/UL — HIGH (ref 1.4–6.5)
NEUTROPHILS NFR BLD AUTO: 72.8 % — SIGNIFICANT CHANGE UP (ref 42.2–75.2)
NRBC BLD AUTO-RTO: 0 /100 WBCS — SIGNIFICANT CHANGE UP (ref 0–0)
PLATELET # BLD AUTO: 168 K/UL — SIGNIFICANT CHANGE UP (ref 130–400)
PMV BLD: 10.3 FL — SIGNIFICANT CHANGE UP (ref 7.4–10.4)
POTASSIUM SERPL-MCNC: 3.8 MMOL/L — SIGNIFICANT CHANGE UP (ref 3.5–5)
POTASSIUM SERPL-SCNC: 3.8 MMOL/L — SIGNIFICANT CHANGE UP (ref 3.5–5)
PROT SERPL-MCNC: 7.2 G/DL — SIGNIFICANT CHANGE UP (ref 6–8)
PROTHROM AB SERPL-ACNC: 12.3 SEC — SIGNIFICANT CHANGE UP (ref 9.95–12.87)
RBC # BLD: 3.82 M/UL — LOW (ref 4.2–5.4)
RBC # FLD: 14.3 % — SIGNIFICANT CHANGE UP (ref 11.5–14.5)
SODIUM SERPL-SCNC: 140 MMOL/L — SIGNIFICANT CHANGE UP (ref 135–146)
WBC # BLD: 10.83 K/UL — HIGH (ref 4.8–10.8)
WBC # FLD AUTO: 10.83 K/UL — HIGH (ref 4.8–10.8)

## 2025-04-24 PROCEDURE — 80053 COMPREHEN METABOLIC PANEL: CPT

## 2025-04-24 PROCEDURE — 73552 X-RAY EXAM OF FEMUR 2/>: CPT | Mod: 26,50

## 2025-04-24 PROCEDURE — 72125 CT NECK SPINE W/O DYE: CPT | Mod: 26

## 2025-04-24 PROCEDURE — 99285 EMERGENCY DEPT VISIT HI MDM: CPT | Mod: GC

## 2025-04-24 PROCEDURE — 97530 THERAPEUTIC ACTIVITIES: CPT | Mod: GP

## 2025-04-24 PROCEDURE — 99223 1ST HOSP IP/OBS HIGH 75: CPT

## 2025-04-24 PROCEDURE — 85025 COMPLETE CBC W/AUTO DIFF WBC: CPT

## 2025-04-24 PROCEDURE — 97110 THERAPEUTIC EXERCISES: CPT | Mod: GP

## 2025-04-24 PROCEDURE — 73590 X-RAY EXAM OF LOWER LEG: CPT | Mod: 26,50

## 2025-04-24 PROCEDURE — 36415 COLL VENOUS BLD VENIPUNCTURE: CPT

## 2025-04-24 PROCEDURE — 80061 LIPID PANEL: CPT

## 2025-04-24 PROCEDURE — 73562 X-RAY EXAM OF KNEE 3: CPT | Mod: 26,50

## 2025-04-24 PROCEDURE — 97162 PT EVAL MOD COMPLEX 30 MIN: CPT | Mod: GP

## 2025-04-24 PROCEDURE — 73502 X-RAY EXAM HIP UNI 2-3 VIEWS: CPT | Mod: LT

## 2025-04-24 PROCEDURE — 72170 X-RAY EXAM OF PELVIS: CPT | Mod: 26

## 2025-04-24 PROCEDURE — 73552 X-RAY EXAM OF FEMUR 2/>: CPT | Mod: LT

## 2025-04-24 PROCEDURE — 74177 CT ABD & PELVIS W/CONTRAST: CPT | Mod: 26

## 2025-04-24 PROCEDURE — 85027 COMPLETE CBC AUTOMATED: CPT

## 2025-04-24 PROCEDURE — 97116 GAIT TRAINING THERAPY: CPT | Mod: GP

## 2025-04-24 PROCEDURE — 70450 CT HEAD/BRAIN W/O DYE: CPT | Mod: 26

## 2025-04-24 PROCEDURE — 71045 X-RAY EXAM CHEST 1 VIEW: CPT | Mod: 26

## 2025-04-24 PROCEDURE — 81001 URINALYSIS AUTO W/SCOPE: CPT

## 2025-04-24 PROCEDURE — 83036 HEMOGLOBIN GLYCOSYLATED A1C: CPT

## 2025-04-24 PROCEDURE — 71260 CT THORAX DX C+: CPT | Mod: 26

## 2025-04-24 PROCEDURE — 80048 BASIC METABOLIC PNL TOTAL CA: CPT

## 2025-04-24 PROCEDURE — 94640 AIRWAY INHALATION TREATMENT: CPT

## 2025-04-24 RX ORDER — SODIUM CHLORIDE 9 G/1000ML
1000 INJECTION, SOLUTION INTRAVENOUS ONCE
Refills: 0 | Status: DISCONTINUED | OUTPATIENT
Start: 2025-04-24 | End: 2025-04-24

## 2025-04-24 RX ORDER — LISINOPRIL 5 MG/1
5 TABLET ORAL DAILY
Refills: 0 | Status: DISCONTINUED | OUTPATIENT
Start: 2025-04-24 | End: 2025-04-28

## 2025-04-24 RX ORDER — MELATONIN 5 MG
3 TABLET ORAL AT BEDTIME
Refills: 0 | Status: DISCONTINUED | OUTPATIENT
Start: 2025-04-24 | End: 2025-04-28

## 2025-04-24 RX ORDER — ACETAMINOPHEN 500 MG/5ML
650 LIQUID (ML) ORAL EVERY 6 HOURS
Refills: 0 | Status: DISCONTINUED | OUTPATIENT
Start: 2025-04-24 | End: 2025-04-28

## 2025-04-24 RX ORDER — EZETIMIBE 10 MG/1
10 TABLET ORAL DAILY
Refills: 0 | Status: DISCONTINUED | OUTPATIENT
Start: 2025-04-24 | End: 2025-04-28

## 2025-04-24 RX ORDER — ALBUTEROL SULFATE 2.5 MG/3ML
2 VIAL, NEBULIZER (ML) INHALATION EVERY 6 HOURS
Refills: 0 | Status: DISCONTINUED | OUTPATIENT
Start: 2025-04-24 | End: 2025-04-28

## 2025-04-24 RX ORDER — MAGNESIUM, ALUMINUM HYDROXIDE 200-200 MG
30 TABLET,CHEWABLE ORAL EVERY 4 HOURS
Refills: 0 | Status: DISCONTINUED | OUTPATIENT
Start: 2025-04-24 | End: 2025-04-28

## 2025-04-24 RX ORDER — MONTELUKAST SODIUM 10 MG/1
10 TABLET ORAL DAILY
Refills: 0 | Status: DISCONTINUED | OUTPATIENT
Start: 2025-04-24 | End: 2025-04-26

## 2025-04-24 RX ORDER — ONDANSETRON HCL/PF 4 MG/2 ML
4 VIAL (ML) INJECTION EVERY 8 HOURS
Refills: 0 | Status: DISCONTINUED | OUTPATIENT
Start: 2025-04-24 | End: 2025-04-28

## 2025-04-24 RX ORDER — ENOXAPARIN SODIUM 100 MG/ML
40 INJECTION SUBCUTANEOUS EVERY 24 HOURS
Refills: 0 | Status: DISCONTINUED | OUTPATIENT
Start: 2025-04-24 | End: 2025-04-28

## 2025-04-24 NOTE — ED PROVIDER NOTE - CLINICAL SUMMARY MEDICAL DECISION MAKING FREE TEXT BOX
Pt with fall this morning, unable to ambulate.  panscan neg.  pt also has new onset hallucinations noted today, possibly 2/2 new mirtazapine.  will admit to med for further eval and mgt.  Any ordered labs and EKG were reviewed, Dr. Kayce Plunkett, attending physician.  Any imaging was ordered and reviewed by me, Dr. Kayce Plunkett, attending physician.  Appropriate medications for patient's presenting complaints were ordered and effects were reassessed.  Patient's records, if available,  (prior hospital, ED visit, and/or nursing home notes if available) were reviewed.  Additional history was obtained from EMS, family, and/or PCP (when available).  Escalation to admission/observation was considered.   Patient requires inpatient hospitalization - monitored setting.

## 2025-04-24 NOTE — H&P ADULT - ATTENDING COMMENTS
Assessment    Unwitnessed fall, likely mechanical  Possible metabolic encephalopathy from urinary retention vs. toxic encephalopathy from mirtazapine, now resolved  Asthma  HTN    Plan    - patient is back to mental baseline, xrays and CTs negative for acute trauma, f/u B12, folate  - retaining urine, unable to urinate but feels like she needs to, having suprapubic fullness and distended bladder on CT, karimi was placed, negative UA  - lisinopril    Pending: monitor mental status, PT, eventual TOV    # DVT PPX: lovenox    GENERAL: NAD, lying in bed comfortably  HEAD:  Atraumatic, normocephalic  NERVOUS SYSTEM:  A&Ox3, moving all extremities, no focal deficits   EYES: EOMI, PERRL  NECK: Supple, trachea midline, no JVD  HEART: Regular rate and rhythm  LUNGS: Clear to auscultation bilaterally, no crackles, wheezing, or rhonchi  ABDOMEN: Soft, nontender, nondistended, +BS, suprapubic fullness  EXTREMITIES: 2+ peripheral pulses bilaterally. No clubbing, cyanosis, or edema    75 minutes spent on review of labs, imaging studies, old records, obtaining history, personally examining patient, counselling and communicating with patient/ family, entering orders for medications/tests/etc, discussions with other health care providers, documentation in electronic health records, independent interpretation of labs, imaging/procedure results and care coordination.

## 2025-04-24 NOTE — H&P ADULT - HISTORY OF PRESENT ILLNESS
**THIS IS AN INCOMPLETE NOTE**    Patient is an 85 years old female with PMHx of Asthma, severe MR, HTN, hx of kyphoplasty of the L1 vertebral body, recently on mirtazapine for insomnia (since 2 days), who was brought in by ambulance s/p mechanical unwitnessed fall and inability to ambulate after. In ED triage, family at bedside reporting that they came in this morning and found the patient on the floow crawling after sliding off the bed at 5 AM. Pt was initially getting up from bed to go to bathroom, and upon her return to bed she slipped off on her side and landed on her bottom. Patient says she is not sure if she hit her head or any other parts of her body.  Patient admits she has bilateral leg pain and unable to walk.  Family at the bedside says she normally walks with a walker, however when she was found this morning she was complaining too much of pain to walk.  Patient denies any chest pain, shortness of breath, abdominal pain, back pain.  Patient denies any hip pain.  Patient admits bilateral knee and thigh pain.  Family says she recently started mirtazapine 2 days ago and noted that she was hallucinating this morning when she was found.  She was seeing people that were not there, and that they witnessed her fall.  Patient states that she saw me at her house this morning. Pt denies dizziness, headaches, LOC, HT, chest pain, SOB.     In the ED, vitals as below:    Vital Signs Last 24 Hrs  T(C): 37.2 (24 Apr 2025 13:52), Max: 37.2 (24 Apr 2025 13:52)  T(F): 98.9 (24 Apr 2025 13:52), Max: 98.9 (24 Apr 2025 13:52)  HR: 84 (24 Apr 2025 13:52) (84 - 84)  BP: 160/88 (24 Apr 2025 13:52) (160/88 - 160/88)  BP(mean): --  RR: 18 (24 Apr 2025 13:52) (18 - 18)  SpO2: 94% (24 Apr 2025 13:52) (94% - 94%)    Parameters below as of 24 Apr 2025 13:52  Patient On (Oxygen Delivery Method): room air      LABS:                          12.0   10.83 )-----------( 168      ( 24 Apr 2025 16:00 )             35.2     04-24    140  |  101  |  19  ----------------------------<  85  3.8   |  26  |  0.8    Ca    9.2      24 Apr 2025 16:00    TPro  7.2  /  Alb  4.4  /  TBili  0.5  /  DBili  x   /  AST  18  /  ALT  13  /  AlkPhos  84  04-24    LIVER FUNCTIONS - ( 24 Apr 2025 16:00 )  Alb: 4.4 g/dL / Pro: 7.2 g/dL / ALK PHOS: 84 U/L / ALT: 13 U/L / AST: 18 U/L / GGT: x           PT/INR - ( 24 Apr 2025 16:00 )   PT: 12.30 sec;   INR: 1.04 ratio         PTT - ( 24 Apr 2025 16:00 )  PTT:29.8 sec  Urinalysis Basic - ( 24 Apr 2025 16:00 )    Color: x / Appearance: x / SG: x / pH: x  Gluc: 85 mg/dL / Ketone: x  / Bili: x / Urobili: x   Blood: x / Protein: x / Nitrite: x   Leuk Esterase: x / RBC: x / WBC x   Sq Epi: x / Non Sq Epi: x / Bacteria: x      IMAGING      - CT Chest/Abdomen/Pelvis:  Since December 24, 2024, no definite evidence of acute traumatic   injury within the chest, abdomen or pelvis.  - CT Head and Spine: Negative. Degenerative changes of the spine with unchanged compression deformity of the L1 vertebral body, status post kyphoplasty. No acute osseous abnormality.  - X-rays negative for any fracture. Patient is an 85 years old female with PMHx of Asthma, severe MR, HTN, hx of kyphoplasty of the L1 vertebral body, recently on mirtazapine for insomnia (since 2 days), who was brought in by ambulance s/p mechanical unwitnessed fall and inability to ambulate after. In ED triage, family at bedside reporting that they came in this morning and found the patient on the floow crawling after sliding off the bed at 5 AM. Pt was initially getting up from bed to go to bathroom, and upon her return to bed she slipped off on her side and landed on her bottom. Patient says she is not sure if she hit her head or any other parts of her body.  Patient admits she has bilateral leg pain and unable to walk.  Family at the bedside says she normally walks with a walker, however when she was found this morning she was complaining too much of pain to walk.  Patient denies any chest pain, shortness of breath, abdominal pain, back pain.  Patient denies any hip pain.  Patient admits bilateral knee and thigh pain.  Family says she recently started mirtazapine 2 days ago and noted that she was hallucinating this morning when she was found.  She was seeing people that were not there, and that they witnessed her fall.  Patient states that she saw me at her house this morning. Pt denies dizziness, headaches, LOC, HT, chest pain, SOB.     In the ED, vitals as below:    Vital Signs Last 24 Hrs  T(C): 37.2 (24 Apr 2025 13:52), Max: 37.2 (24 Apr 2025 13:52)  T(F): 98.9 (24 Apr 2025 13:52), Max: 98.9 (24 Apr 2025 13:52)  HR: 84 (24 Apr 2025 13:52) (84 - 84)  BP: 160/88 (24 Apr 2025 13:52) (160/88 - 160/88)  BP(mean): --  RR: 18 (24 Apr 2025 13:52) (18 - 18)  SpO2: 94% (24 Apr 2025 13:52) (94% - 94%)    Parameters below as of 24 Apr 2025 13:52  Patient On (Oxygen Delivery Method): room air      LABS:                          12.0   10.83 )-----------( 168      ( 24 Apr 2025 16:00 )             35.2     04-24    140  |  101  |  19  ----------------------------<  85  3.8   |  26  |  0.8    Ca    9.2      24 Apr 2025 16:00    TPro  7.2  /  Alb  4.4  /  TBili  0.5  /  DBili  x   /  AST  18  /  ALT  13  /  AlkPhos  84  04-24    LIVER FUNCTIONS - ( 24 Apr 2025 16:00 )  Alb: 4.4 g/dL / Pro: 7.2 g/dL / ALK PHOS: 84 U/L / ALT: 13 U/L / AST: 18 U/L / GGT: x           PT/INR - ( 24 Apr 2025 16:00 )   PT: 12.30 sec;   INR: 1.04 ratio         PTT - ( 24 Apr 2025 16:00 )  PTT:29.8 sec  Urinalysis Basic - ( 24 Apr 2025 16:00 )    Color: x / Appearance: x / SG: x / pH: x  Gluc: 85 mg/dL / Ketone: x  / Bili: x / Urobili: x   Blood: x / Protein: x / Nitrite: x   Leuk Esterase: x / RBC: x / WBC x   Sq Epi: x / Non Sq Epi: x / Bacteria: x      IMAGING      - CT Chest/Abdomen/Pelvis:  Since December 24, 2024, no definite evidence of acute traumatic   injury within the chest, abdomen or pelvis.  - CT Head and Spine: Negative. Degenerative changes of the spine with unchanged compression deformity of the L1 vertebral body, status post kyphoplasty. No acute osseous abnormality.  - X-rays negative for any fracture.

## 2025-04-24 NOTE — H&P ADULT - ASSESSMENT
Patient is an 85 years old female with PMHx of Asthma, severe MR, HTN, hx of kyphoplasty of the L1 vertebral body, recently on mirtazapine for insomnia (since 2 days), who was brought in by ambulance s/p mechanical unwitnessed fall and inability to ambulate after. In ED triage, family at bedside reporting that they came in this morning and found the patient on the floow crawling after sliding off the bed at 5 AM. Pt was initially getting up from bed to go to bathroom, and upon her return to bed she slipped off on her side and landed on her bottom.    #Mechanical unwitness fall   #AMS  - Brought in by ambulance for fall and inability to ambulate.  Family is at the bedside who says they came this morning and found her on the floor crawling due to sliding off the bed at 5 AM this morning.  - Pt reports getting up going to bathroom, returning to her bed and slipping off  - Pt ambulates with a walker at baseline  - Of note, patient recently started mirtazapine 2 days ago and noted that she was hallucinating this morning when she was found. She was seeing people that were not there, and that they witnessed her fall.  - CT Chest/Abdomen/Pelvis:  Since December 24, 2024, no definite evidence of acute traumatic   injury within the chest, abdomen or pelvis.  - CT Head and Spine: Negative. Degenerative changes of the spine with unchanged compression deformity of the L1 vertebral body, status post kyphoplasty. No acute osseous abnormality.  - X-rays negative for any fracture  - PT/OT  - Orthostats  - ECG no ischemic changes  - Hold mirtazapine for now   - No obvious symptoms of infection, UA pending    #Severe MR  #Severe pulmonary hypertension  #HTN  - As per the daughter (who is a nurse), patient has severe MR and at [facility] they tried to perform a mitral clip however it was unsuccessful and the cardiologist told her that she has 2 years to live (this was 22 Months ago).  - Follow with Dr Janette Person  - patient is not in HF exacerbation  - c/w lasix 40 mg OD starting tomorrow  - hold the ramipril and resume as needed      #Hx of kyphoplasty of the L1 vertebral body.  - On Spine CT: Degenerative changes of the spine with unchanged compression deformity of the L1 vertebral body, status post kyphoplasty. No acute osseous abnormality.  - Stable  - monitor    #Diet: DASH w fluid restriction  #DVT pro: lovenox  #Activity: as tolerated  #Dispo: med  #Med rec: XX  #Code status: DNR/DNI w trial of NIV    Pending(s):   Patient is an 85 years old female with PMHx of Asthma, severe MR, HTN, hx of kyphoplasty of the L1 vertebral body, recently on mirtazapine for insomnia (since 2 days), who was brought in by ambulance s/p mechanical unwitnessed fall and inability to ambulate after. In ED triage, family at bedside reporting that they came in this morning and found the patient on the floow crawling after sliding off the bed at 5 AM. Pt was initially getting up from bed to go to bathroom, and upon her return to bed she slipped off on her side and landed on her bottom.    #Mechanical unwitness fall   #AMS  - Brought in by ambulance for fall and inability to ambulate.  Family is at the bedside who says they came this morning and found her on the floor crawling due to sliding off the bed at 5 AM this morning.  - Pt reports getting up going to bathroom, returning to her bed and slipping off  - Pt ambulates with a walker at baseline  - Of note, patient recently started mirtazapine 2 days ago and noted that she was hallucinating this morning when she was found. She was seeing people that were not there, and that they witnessed her fall.  - Patient mental status was altered in the AM, reported hallucinating. On my examination 4/24 PM, patient was AOx3, baseline according to family at bedside  - CT Chest/Abdomen/Pelvis:  Since December 24, 2024, no definite evidence of acute traumatic   injury within the chest, abdomen or pelvis.  - CT Head and Spine: Negative. Degenerative changes of the spine with unchanged compression deformity of the L1 vertebral body, status post kyphoplasty. No acute osseous abnormality.  - X-rays negative for any fracture  - PT/OT  - Orthostats  - ECG no ischemic changes  - Hold mirtazapine for now   - No obvious symptoms of infection, UA pending    #Severe MR  #Severe pulmonary hypertension  #HTN  - As per the daughter (who is a nurse), patient has severe MR and at [facility] they tried to perform a mitral clip however it was unsuccessful and the cardiologist told her that she has 2 years to live (this was 22 Months ago).  - Follow with Dr Janette Person  - patient is not in HF exacerbation  - c/w lasix 40 mg OD starting tomorrow  - hold the ramipril and resume as needed      #Hx of kyphoplasty of the L1 vertebral body.  - On Spine CT: Degenerative changes of the spine with unchanged compression deformity of the L1 vertebral body, status post kyphoplasty. No acute osseous abnormality.  - Stable  - monitor    #Diet: DASH w fluid restriction  #DVT pro: lovenox  #Activity: as tolerated  #Dispo: med  #Med rec: confirmed with patients daughter  #Code status: DNR/DNI w trial of NIV      Pending(s): PT/OT, orthostats, UA   Patient is an 85 years old female with PMHx of Asthma, severe MR, HTN, hx of kyphoplasty of the L1 vertebral body, recently on mirtazapine for insomnia (since 2 days), who was brought in by ambulance s/p mechanical unwitnessed fall and inability to ambulate after. In ED triage, family at bedside reporting that they came in this morning and found the patient on the floow crawling after sliding off the bed at 5 AM. Pt was initially getting up from bed to go to bathroom, and upon her return to bed she slipped off on her side and landed on her bottom.    #Mechanical unwitness fall   #AMS  - Brought in by ambulance for fall and inability to ambulate.  Family is at the bedside who says they came this morning and found her on the floor crawling due to sliding off the bed at 5 AM this morning.  - Pt reports getting up going to bathroom, returning to her bed and slipping off  - Pt ambulates with a walker at baseline  - Of note, patient recently started mirtazapine 2 days ago and noted that she was hallucinating this morning when she was found. She was seeing people that were not there, and that they witnessed her fall.  - Patient mental status was altered in the AM, reported hallucinating. On my examination 4/24 PM, patient was AOx3, baseline according to family at bedside  - CT Chest/Abdomen/Pelvis:  Since December 24, 2024, no definite evidence of acute traumatic   injury within the chest, abdomen or pelvis.  - CT Head and Spine: Negative. Degenerative changes of the spine with unchanged compression deformity of the L1 vertebral body, status post kyphoplasty. No acute osseous abnormality.  - X-rays negative for any fracture  - PT/OT  - Orthostats  - ECG no ischemic changes  - Hold mirtazapine for now   - No obvious symptoms of infection, UA pending    #Severe MR  #Severe pulmonary hypertension  #HTN  - As per the daughter (who is a nurse), patient has severe MR and at [facility] they tried to perform a mitral clip however it was unsuccessful and the cardiologist told her that she has 2 years to live (this was 22 Months ago).  - Follow with Dr Janette Person  - patient is not in HF exacerbation  - c/w furosemide 40 QD  - c/w ramipril      #Hx of kyphoplasty of the L1 vertebral body.  - On Spine CT: Degenerative changes of the spine with unchanged compression deformity of the L1 vertebral body, status post kyphoplasty. No acute osseous abnormality.  - Stable  - monitor    #Diet: DASH w fluid restriction  #DVT pro: lovenox  #Activity: as tolerated  #Dispo: med  #Med rec: confirmed with patients daughter  #Code status: DNR/DNI w trial of NIV      Pending(s): PT/OT, orthostats, UA

## 2025-04-24 NOTE — ED PROVIDER NOTE - CARE PLAN
Principal Discharge DX:	Fall  Secondary Diagnosis:	Unable to stand up  Secondary Diagnosis:	Hallucinations   1

## 2025-04-24 NOTE — ED ADULT TRIAGE NOTE - CHIEF COMPLAINT QUOTE
pt BIBA from home due to fall at home, pt has c/o b/l knee pain. + swelling to left leg. Pt ambulatory with 1 assist

## 2025-04-24 NOTE — ED PROVIDER NOTE - ATTENDING CONTRIBUTION TO CARE
85-year-old female past medical history of asthma, recently started on mirtazapine for insomnia, brought in by ambulance for fall and inability to ambulate.  Family is at the bedside who says they came this morning and found her on the floor crawling due to sliding off the bed at 5 AM this morning.  Was able to give history of getting up going to the bathroom and returning to her bed but then slipped off the side and landed on her bottom.  Patient says she is not sure if she hit her head or any other parts of her body.  Patient admits she has bilateral leg pain and unable to walk.  Family at the bedside says she normally walks with a walker, however when she was found this morning she was complaining too much of pain to walk.  Patient denies any chest pain, shortness of breath, abdominal pain, back pain.  Patient denies any hip pain.  Patient admits bilateral knee and thigh pain.  Family says she recently started mirtazapine 2 days ago and noted that she was hallucinating this morning when she was found.  She was seeing people that were not there, and that they witnessed her fall.  Patient states that she saw me at her house this morning.  Exam: nad, ncat, perrl, eomi, mmm, rrr, ctab, abd soft, nt, nd aox3, tenderness bilateral thighs, and bilateral knees, no significant gross deformities or swelling, unable to walk impression: Patient with fall to the floor, slipped off the bed, uncertain if injured any other parts of the body.  Patient also with altered mental status, new hallucinations.  Patient is unable to walk, will likely be admitted

## 2025-04-24 NOTE — ED PROVIDER NOTE - PHYSICAL EXAMINATION
VITAL SIGNS: I have reviewed nursing notes and confirm.  CONSTITUTIONAL: Well-developed; well-nourished; in no acute distress.  CARD: S1, S2 normal; no murmurs, gallops, or rubs. Regular rate and rhythm.  RESP: Normal respiratory effort, no tachypnea or distress. Lungs CTAB, no wheezes, rales or rhonchi.  ABD: soft, NT/ND.  Neuro: A&Ox3, normal speech, CN II-XII intact, FROM & strength 5/5 x4 extremities. Sensation intact and equal. Normal gait, (-)romberg, no pronator drift, no dysmetria on finger to nose b/l. Positive Rightward beating nystagmus in R eye.  PSYCH: Cooperative, appropriate. VITAL SIGNS: I have reviewed nursing notes and confirm.  CONSTITUTIONAL: Well-developed; well-nourished; in no acute distress.  SKIN: Skin exam is warm and dry, no acute rash.  HEAD: Normocephalic; atraumatic.  NECK: Supple; non tender.  CARD: S1, S2 normal; Systolic murmur. Regular rate and rhythm.  RESP: Normal respiratory effort, no tachypnea or distress. Lungs CTAB, no wheezes, rales or rhonchi.  ABD: soft, NT/ND.  EXT: Pain with knee flexion.  No pain in pelvis or femurs  NEURO: Alert, oriented. Grossly unremarkable. No focal deficits.  PSYCH: Cooperative, appropriate.

## 2025-04-24 NOTE — ED PROVIDER NOTE - OBJECTIVE STATEMENT
54-year-old female the past medical history of cholesteatoma surgery, left rotator cuff pain presents to the emergency department complaining of 2 days of dizziness, nausea and right facial and head tightness.  Patient states that she feels lightheaded and that the room is spinning.  Exacerbated with motion.  Currently not nauseous.  Reports severe anxiety.  Denies headache.  No fever/chills/vomiting/diarrhea at this time 84-year-old female with a past medical history of severe mitral regurgitation, pulmonary hypertension, asthma/COPD, hypertension presents to the emergency department status post fall.  Patient states that she was holding onto her walker and missed the bed when she sat down.  Patient was unable to get up afterwards and was on the floor from approximately 4 AM till 8 AM.  Daughter states that patient is also intermittently hallucinating since starting new drug mirtazapine at night.  Patient states that she is unsure if she hit her head and complains of pain to bilateral legs but is unable to localize.  Denies headache/chest pain/shortness of breath/pain in hips/abdominal pain/nausea/vomiting.  No recent illnesses

## 2025-04-24 NOTE — ED PROVIDER NOTE - PROGRESS NOTE DETAILS
Authored by Dr. Guzmán: Imaging noted.  Patient still unable to stand.  Patient intermittently hallucinates.  Will admit to medicine for further evaluation/placement

## 2025-04-25 LAB
A1C WITH ESTIMATED AVERAGE GLUCOSE RESULT: 5.9 % — HIGH (ref 4–5.6)
APPEARANCE UR: CLEAR — SIGNIFICANT CHANGE UP
BACTERIA # UR AUTO: NEGATIVE /HPF — SIGNIFICANT CHANGE UP
BASOPHILS # BLD AUTO: 0.05 K/UL — SIGNIFICANT CHANGE UP (ref 0–0.2)
BASOPHILS NFR BLD AUTO: 0.6 % — SIGNIFICANT CHANGE UP (ref 0–1)
BILIRUB UR-MCNC: NEGATIVE — SIGNIFICANT CHANGE UP
CAST: 0 /LPF — SIGNIFICANT CHANGE UP (ref 0–4)
CHOLEST SERPL-MCNC: 177 MG/DL — SIGNIFICANT CHANGE UP
COLOR SPEC: YELLOW — SIGNIFICANT CHANGE UP
DIFF PNL FLD: ABNORMAL
EOSINOPHIL # BLD AUTO: 0.33 K/UL — SIGNIFICANT CHANGE UP (ref 0–0.7)
EOSINOPHIL NFR BLD AUTO: 3.9 % — SIGNIFICANT CHANGE UP (ref 0–8)
ESTIMATED AVERAGE GLUCOSE: 123 MG/DL — HIGH (ref 68–114)
GLUCOSE UR QL: NEGATIVE MG/DL — SIGNIFICANT CHANGE UP
HCT VFR BLD CALC: 34 % — LOW (ref 37–47)
HDLC SERPL-MCNC: 48 MG/DL — LOW
HGB BLD-MCNC: 11.5 G/DL — LOW (ref 12–16)
IMM GRANULOCYTES NFR BLD AUTO: 0.2 % — SIGNIFICANT CHANGE UP (ref 0.1–0.3)
KETONES UR-MCNC: ABNORMAL MG/DL
LDLC SERPL-MCNC: 114 MG/DL — HIGH
LEUKOCYTE ESTERASE UR-ACNC: NEGATIVE — SIGNIFICANT CHANGE UP
LIPID PNL WITH DIRECT LDL SERPL: 114 MG/DL — HIGH
LYMPHOCYTES # BLD AUTO: 1.36 K/UL — SIGNIFICANT CHANGE UP (ref 1.2–3.4)
LYMPHOCYTES # BLD AUTO: 16.1 % — LOW (ref 20.5–51.1)
MCHC RBC-ENTMCNC: 31 PG — SIGNIFICANT CHANGE UP (ref 27–31)
MCHC RBC-ENTMCNC: 33.8 G/DL — SIGNIFICANT CHANGE UP (ref 32–37)
MCV RBC AUTO: 91.6 FL — SIGNIFICANT CHANGE UP (ref 81–99)
MONOCYTES # BLD AUTO: 0.84 K/UL — HIGH (ref 0.1–0.6)
MONOCYTES NFR BLD AUTO: 9.9 % — HIGH (ref 1.7–9.3)
NEUTROPHILS # BLD AUTO: 5.87 K/UL — SIGNIFICANT CHANGE UP (ref 1.4–6.5)
NEUTROPHILS NFR BLD AUTO: 69.3 % — SIGNIFICANT CHANGE UP (ref 42.2–75.2)
NITRITE UR-MCNC: NEGATIVE — SIGNIFICANT CHANGE UP
NONHDLC SERPL-MCNC: 129 MG/DL — SIGNIFICANT CHANGE UP
NRBC BLD AUTO-RTO: 0 /100 WBCS — SIGNIFICANT CHANGE UP (ref 0–0)
PH UR: 7 — SIGNIFICANT CHANGE UP (ref 5–8)
PLATELET # BLD AUTO: 164 K/UL — SIGNIFICANT CHANGE UP (ref 130–400)
PMV BLD: 10.3 FL — SIGNIFICANT CHANGE UP (ref 7.4–10.4)
PROT UR-MCNC: NEGATIVE MG/DL — SIGNIFICANT CHANGE UP
RBC # BLD: 3.71 M/UL — LOW (ref 4.2–5.4)
RBC # FLD: 14.1 % — SIGNIFICANT CHANGE UP (ref 11.5–14.5)
RBC CASTS # UR COMP ASSIST: 5 /HPF — HIGH (ref 0–4)
SP GR SPEC: >1.03 — HIGH (ref 1–1.03)
SQUAMOUS # UR AUTO: 0 /HPF — SIGNIFICANT CHANGE UP (ref 0–5)
TRIGL SERPL-MCNC: 78 MG/DL — SIGNIFICANT CHANGE UP
UROBILINOGEN FLD QL: 0.2 MG/DL — SIGNIFICANT CHANGE UP (ref 0.2–1)
WBC # BLD: 8.47 K/UL — SIGNIFICANT CHANGE UP (ref 4.8–10.8)
WBC # FLD AUTO: 8.47 K/UL — SIGNIFICANT CHANGE UP (ref 4.8–10.8)
WBC UR QL: 0 /HPF — SIGNIFICANT CHANGE UP (ref 0–5)

## 2025-04-25 PROCEDURE — 99232 SBSQ HOSP IP/OBS MODERATE 35: CPT

## 2025-04-25 RX ORDER — INFLUENZA A VIRUS A/IDAHO/07/2018 (H1N1) ANTIGEN (MDCK CELL DERIVED, PROPIOLACTONE INACTIVATED, INFLUENZA A VIRUS A/INDIANA/08/2018 (H3N2) ANTIGEN (MDCK CELL DERIVED, PROPIOLACTONE INACTIVATED), INFLUENZA B VIRUS B/SINGAPORE/INFTT-16-0610/2016 ANTIGEN (MDCK CELL DERIVED, PROPIOLACTONE INACTIVATED), INFLUENZA B VIRUS B/IOWA/06/2017 ANTIGEN (MDCK CELL DERIVED, PROPIOLACTONE INACTIVATED) 15; 15; 15; 15 UG/.5ML; UG/.5ML; UG/.5ML; UG/.5ML
0.5 INJECTION, SUSPENSION INTRAMUSCULAR ONCE
Refills: 0 | Status: DISCONTINUED | OUTPATIENT
Start: 2025-04-25 | End: 2025-04-28

## 2025-04-25 RX ADMIN — Medication 40 MILLIGRAM(S): at 05:28

## 2025-04-25 RX ADMIN — LISINOPRIL 5 MILLIGRAM(S): 5 TABLET ORAL at 05:28

## 2025-04-25 RX ADMIN — Medication 650 MILLIGRAM(S): at 21:12

## 2025-04-25 RX ADMIN — EZETIMIBE 10 MILLIGRAM(S): 10 TABLET ORAL at 12:21

## 2025-04-25 RX ADMIN — MONTELUKAST SODIUM 10 MILLIGRAM(S): 10 TABLET ORAL at 12:21

## 2025-04-25 RX ADMIN — Medication 1 APPLICATION(S): at 12:21

## 2025-04-25 NOTE — PROGRESS NOTE ADULT - ASSESSMENT
Unwitnessed fall, likely mechanical  Possible metabolic encephalopathy from urinary retention vs. toxic encephalopathy from mirtazapine, now resolved  Asthma  HTN    Plan    - patient is back to mental baseline, xrays and CTs negative for acute trauma, f/u B12, folate  - karimi was placed for urine retention. TOV today with q6 hr bladder scan  - PT  - lisinopril    Pending: monitor mental status, TOV with bladder scans    # DVT PPX: lovenox prophylaxis

## 2025-04-25 NOTE — PROGRESS NOTE ADULT - ASSESSMENT
85 years old female with PMHx of Asthma, severe MR, HTN, hx of kyphoplasty of the L1 vertebral body, recently on mirtazapine for insomnia (since 2 days), who was brought in by ambulance s/p mechanical unwitnessed fall and inability to ambulate after. In ED triage, family at bedside reporting that they came in this morning and found the patient on the floow crawling after sliding off the bed at 5 AM. Pt was initially getting up from bed to go to bathroom, and upon her return to bed she slipped off on her side and landed on her bottom.      Unwitnessed fall, likely mechanical  Possible metabolic encephalopathy from urinary retention vs. toxic encephalopathy from mirtazapine, now resolved  Asthma  HTN    Plan    - patient is back to mental baseline, xrays and CTs negative for acute trauma, f/u B12, folate  - retaining urine, unable to urinate but feels like she needs to, having suprapubic fullness and distended bladder on CT, karimi was placed, negative UA-- karimi removed today and trial of void was started  - lisinopril    Pending: monitor mental status, PT, TOV today-- DC plan on weekend    # DVT PPX: lovenox

## 2025-04-25 NOTE — ED ADULT NURSE NOTE - ALCOHOL PRE SCREEN (AUDIT - C)
Cystoscopy     Date/Time 3/30/2023 8:15 AM     Performed by  Kendall Dejesus MD     Authorized by Kendall Dejesus MD          Procedure Details:  Procedure type: cystoscopy, injection for chemodenervation        Office Cystoscopy Procedure Note    Indication:     medically refractory overactive bladder    Informed consent   The risks, benefits, complications, treatment options, and expected outcomes were discussed with the patient  The patient concurred with the proposed plan and provided informed consent  Anesthesia  Lidocaine jelly 2%    Antibiotic prophylaxis   None    Procedure  In the presence of a female nurse, the patient was placed in the supine frog-legged position, was prepped and draped in the usual manner using sterile technique, and 2% lidocaine jelly instilled into the urethra  A 20 Thai Thomas catheter was placed under sterile conditions  The bladder was drained and through this 20 mL of 2% plain lidocaine was instilled and allowed to dwell for 10 minutes to provide analgesic      A 17 F flexible cystoscope was then inserted into the urethra and the urethra and bladder carefully examined  The following findings were noted:    Findings:  Urethra:  Normal  Bladder:  Normal  Ureteral orifices:  Normal  Other findings:  None        200 units of Botox were diluted into injectable saline  These were injected using 0 5 mL boluses in multiple locations in a super trigonal pattern  Care was taken to avoid the trigone  The bladder was then observed with no active installation hemostasis was noted to be excellent  Specimens: None                 Complications:    None; patient tolerated the procedure well           Disposition: To home after 30 minute observation  Condition: Stable    Plan:      patient has previously enjoyed a excellent response to Botox for duration of approximately 1 year  Of note she is on no oral pharmacotherapy    We will plan her next installation of 200 units bladder Botox in 1 year's time    Shee was asked to call if the effectiveness wears off sooner and she can be scheduled for sooner appointment or her next injection can be pushed back further if  She continues to do well Statement Selected

## 2025-04-25 NOTE — PHYSICAL THERAPY INITIAL EVALUATION ADULT - NSACTIVITYREC_GEN_A_PT
Pt was instructed on the importance of out of bed activity to improve functional status. Pt was educated on the importance of PT and therapeutic exercises to improve LE strength and mobility.

## 2025-04-25 NOTE — ED ADULT NURSE NOTE - NSFALLDEVICES_ED_ALL_ED
CXR unremarkable, RVP negative   pain control discussed with pharmacy by prior team  appears to respond to morphine better than Dilaudid based on previous records.   VOC likely resolved.   c/w folate, MVI None

## 2025-04-25 NOTE — PROVIDER CONTACT NOTE (OTHER) - REASON
pt walked with PT but pt and pt family does not want the FC removed
pt has a karimi catheter with no order

## 2025-04-25 NOTE — ED ADULT NURSE NOTE - NSFALLRISKINTERV_ED_ALL_ED
Assistance OOB with selected safe patient handling equipment if applicable/Assistance with ambulation/Communicate fall risk and risk factors to all staff, patient, and family/Monitor gait and stability/Provide visual cue: yellow wristband, yellow gown, etc/Reinforce activity limits and safety measures with patient and family/Toileting schedule using arm’s reach rule for commode and bathroom/Call bell, personal items and telephone in reach/Instruct patient to call for assistance before getting out of bed/chair/stretcher/Non-slip footwear applied when patient is off stretcher/[place] to call system/Physically safe environment - no spills, clutter or unnecessary equipment/Purposeful Proactive Rounding/Room/bathroom lighting operational, light cord in reach

## 2025-04-25 NOTE — PATIENT PROFILE ADULT - INTERPRETATION SERVICES DECLINED
Xanax can be use for Anxiety and panic attack as need it ,if patient start use it more often we need to discuss maintenance medication Patient/Caregiver requests family/friend to interpret.

## 2025-04-25 NOTE — PHYSICAL THERAPY INITIAL EVALUATION ADULT - IMPAIRMENTS CONTRIBUTING TO GAIT DEVIATIONS, PT EVAL
For information on Fall & Injury Prevention, visit [email or web address]
impaired balance/decreased strength

## 2025-04-25 NOTE — PATIENT PROFILE ADULT - FALL HARM RISK - HARM RISK INTERVENTIONS

## 2025-04-25 NOTE — PHYSICAL THERAPY INITIAL EVALUATION ADULT - GENERAL OBSERVATIONS, REHAB EVAL
Pt was found in bed semireclined. AOx2. Patient daughter bedside.  used [id number] (Faviola).  +Mendez, +IV lock.

## 2025-04-25 NOTE — ED ADULT NURSE NOTE - HOW OFTEN DO YOU HAVE A DRINK CONTAINING ALCOHOL?

## 2025-04-25 NOTE — PROVIDER CONTACT NOTE (OTHER) - SITUATION
pt has a karimi catheter with no order
pt walked with PT but pt and pt family does not want the FC removed

## 2025-04-25 NOTE — PHYSICAL THERAPY INITIAL EVALUATION ADULT - ADDITIONAL COMMENTS
Prior to admission, pt was walking with rolling walker. Has [facility] (aide) everyday to assist with ambulation and ADLs. Daughter was bedside answering questions for history due to pt being poor historian.

## 2025-04-25 NOTE — PROVIDER CONTACT NOTE (OTHER) - ACTION/TREATMENT ORDERED:
md leyva notified and aware, pt ordered for PT/OT and was told to dc FC after PT has seen the pt, nursing leadership notified and aware, will cont to monitor
md leyva notified and aware, md will speak to the pt

## 2025-04-26 LAB
ALBUMIN SERPL ELPH-MCNC: 3.9 G/DL — SIGNIFICANT CHANGE UP (ref 3.5–5.2)
ALP SERPL-CCNC: 71 U/L — SIGNIFICANT CHANGE UP (ref 30–115)
ALT FLD-CCNC: 11 U/L — SIGNIFICANT CHANGE UP (ref 0–41)
ANION GAP SERPL CALC-SCNC: 10 MMOL/L — SIGNIFICANT CHANGE UP (ref 7–14)
AST SERPL-CCNC: 17 U/L — SIGNIFICANT CHANGE UP (ref 0–41)
BILIRUB SERPL-MCNC: 0.7 MG/DL — SIGNIFICANT CHANGE UP (ref 0.2–1.2)
BUN SERPL-MCNC: 24 MG/DL — HIGH (ref 10–20)
CALCIUM SERPL-MCNC: 8.6 MG/DL — SIGNIFICANT CHANGE UP (ref 8.4–10.5)
CHLORIDE SERPL-SCNC: 107 MMOL/L — SIGNIFICANT CHANGE UP (ref 98–110)
CO2 SERPL-SCNC: 25 MMOL/L — SIGNIFICANT CHANGE UP (ref 17–32)
CREAT SERPL-MCNC: 0.9 MG/DL — SIGNIFICANT CHANGE UP (ref 0.7–1.5)
EGFR: 62 ML/MIN/1.73M2 — SIGNIFICANT CHANGE UP
EGFR: 62 ML/MIN/1.73M2 — SIGNIFICANT CHANGE UP
GLUCOSE SERPL-MCNC: 98 MG/DL — SIGNIFICANT CHANGE UP (ref 70–99)
HCT VFR BLD CALC: 34.7 % — LOW (ref 37–47)
HGB BLD-MCNC: 11.7 G/DL — LOW (ref 12–16)
MCHC RBC-ENTMCNC: 31.7 PG — HIGH (ref 27–31)
MCHC RBC-ENTMCNC: 33.7 G/DL — SIGNIFICANT CHANGE UP (ref 32–37)
MCV RBC AUTO: 94 FL — SIGNIFICANT CHANGE UP (ref 81–99)
NRBC BLD AUTO-RTO: 0 /100 WBCS — SIGNIFICANT CHANGE UP (ref 0–0)
PLATELET # BLD AUTO: 176 K/UL — SIGNIFICANT CHANGE UP (ref 130–400)
PMV BLD: 10.1 FL — SIGNIFICANT CHANGE UP (ref 7.4–10.4)
POTASSIUM SERPL-MCNC: 4.2 MMOL/L — SIGNIFICANT CHANGE UP (ref 3.5–5)
POTASSIUM SERPL-SCNC: 4.2 MMOL/L — SIGNIFICANT CHANGE UP (ref 3.5–5)
PROT SERPL-MCNC: 6.7 G/DL — SIGNIFICANT CHANGE UP (ref 6–8)
RBC # BLD: 3.69 M/UL — LOW (ref 4.2–5.4)
RBC # FLD: 13.9 % — SIGNIFICANT CHANGE UP (ref 11.5–14.5)
SODIUM SERPL-SCNC: 142 MMOL/L — SIGNIFICANT CHANGE UP (ref 135–146)
WBC # BLD: 8.76 K/UL — SIGNIFICANT CHANGE UP (ref 4.8–10.8)
WBC # FLD AUTO: 8.76 K/UL — SIGNIFICANT CHANGE UP (ref 4.8–10.8)

## 2025-04-26 PROCEDURE — 99232 SBSQ HOSP IP/OBS MODERATE 35: CPT

## 2025-04-26 RX ADMIN — Medication 1 APPLICATION(S): at 11:21

## 2025-04-26 RX ADMIN — Medication 650 MILLIGRAM(S): at 16:47

## 2025-04-26 RX ADMIN — LISINOPRIL 5 MILLIGRAM(S): 5 TABLET ORAL at 05:35

## 2025-04-26 RX ADMIN — Medication 40 MILLIGRAM(S): at 05:20

## 2025-04-26 RX ADMIN — MONTELUKAST SODIUM 10 MILLIGRAM(S): 10 TABLET ORAL at 11:15

## 2025-04-26 RX ADMIN — EZETIMIBE 10 MILLIGRAM(S): 10 TABLET ORAL at 11:15

## 2025-04-27 PROCEDURE — 99232 SBSQ HOSP IP/OBS MODERATE 35: CPT

## 2025-04-27 PROCEDURE — 73502 X-RAY EXAM HIP UNI 2-3 VIEWS: CPT | Mod: 26,LT

## 2025-04-27 PROCEDURE — 73552 X-RAY EXAM OF FEMUR 2/>: CPT | Mod: 26,LT

## 2025-04-27 RX ORDER — IBUPROFEN 200 MG
600 TABLET ORAL ONCE
Refills: 0 | Status: COMPLETED | OUTPATIENT
Start: 2025-04-27 | End: 2025-04-27

## 2025-04-27 RX ADMIN — Medication 600 MILLIGRAM(S): at 16:52

## 2025-04-27 RX ADMIN — Medication 40 MILLIGRAM(S): at 05:39

## 2025-04-27 RX ADMIN — LISINOPRIL 5 MILLIGRAM(S): 5 TABLET ORAL at 05:39

## 2025-04-27 RX ADMIN — Medication 650 MILLIGRAM(S): at 11:03

## 2025-04-27 RX ADMIN — ENOXAPARIN SODIUM 40 MILLIGRAM(S): 100 INJECTION SUBCUTANEOUS at 05:38

## 2025-04-27 RX ADMIN — Medication 1 DOSE(S): at 19:35

## 2025-04-27 RX ADMIN — Medication 1 APPLICATION(S): at 11:06

## 2025-04-27 RX ADMIN — EZETIMIBE 10 MILLIGRAM(S): 10 TABLET ORAL at 11:04

## 2025-04-27 NOTE — PROGRESS NOTE ADULT - SUBJECTIVE AND OBJECTIVE BOX
SUBJECTIVE/OVERNIGHT EVENTS  Today is hospital day 1d. This morning patient was seen and examined at bedside, resting comfortably in bed. No acute or major events overnight.    MEDICATIONS  STANDING MEDICATIONS  chlorhexidine 2% Cloths 1 Application(s) Topical daily  enoxaparin Injectable 40 milliGRAM(s) SubCutaneous every 24 hours  ezetimibe 10 milliGRAM(s) Oral daily  influenza  Vaccine (HIGH DOSE) 0.5 milliLiter(s) IntraMuscular once  lisinopril 5 milliGRAM(s) Oral daily  montelukast 10 milliGRAM(s) Oral daily  pantoprazole    Tablet 40 milliGRAM(s) Oral before breakfast    PRN MEDICATIONS  acetaminophen     Tablet .. 650 milliGRAM(s) Oral every 6 hours PRN  albuterol    90 MICROgram(s) HFA Inhaler 2 Puff(s) Inhalation every 6 hours PRN  aluminum hydroxide/magnesium hydroxide/simethicone Suspension 30 milliLiter(s) Oral every 4 hours PRN  melatonin 3 milliGRAM(s) Oral at bedtime PRN  ondansetron Injectable 4 milliGRAM(s) IV Push every 8 hours PRN    VITALS  T(F): 99 (04-25-25 @ 07:42), Max: 99 (04-25-25 @ 07:42)  HR: 67 (04-25-25 @ 07:42) (62 - 78)  BP: 117/68 (04-25-25 @ 07:42) (117/68 - 156/74)  RR: 17 (04-25-25 @ 07:42) (16 - 18)  SpO2: 96% (04-25-25 @ 07:42) (95% - 98%)    PHYSICAL EXAM  No distress. Lying comfortably in bed. Speaking coherently to me.  Clear lungs  normal HR  soft, non-tender belly  karimi in place this AM on my exam  no pitting edema of LE  LABS             11.5   8.47  )-----------( 164      ( 04-25-25 @ 07:29 )             34.0     140  |  101  |  19  -------------------------<  85   04-24-25 @ 16:00  3.8  |  26  |  0.8    Ca      9.2     04-24-25 @ 16:00    TPro  7.2  /  Alb  4.4  /  TBili  0.5  /  DBili  x   /  AST  18  /  ALT  13  /  AlkPhos  84  /  GGT  x     04-24-25 @ 16:00    PT/INR - ( 04-24-25 @ 16:00 )   PT: 12.30 sec;   INR: 1.04 ratio  PTT - ( 04-24-25 @ 16:00 )  PTT:29.8 sec      Urinalysis Basic - ( 25 Apr 2025 02:27 )    Color: Yellow / Appearance: Clear / SG: >1.030 / pH: x  Gluc: x / Ketone: Trace mg/dL  / Bili: Negative / Urobili: 0.2 mg/dL   Blood: x / Protein: Negative mg/dL / Nitrite: Negative   Leuk Esterase: Negative / RBC: 5 /HPF / WBC 0 /HPF   Sq Epi: x / Non Sq Epi: 0 /HPF / Bacteria: Negative /HPF          Urinalysis with Rflx Culture (collected 25 Apr 2025 02:27)  
SUBJECTIVE:    Patient is a 85y old Female who presents with a chief complaint of   Currently admitted to medicine with the primary diagnosis of Fall       Today is hospital day 1d.     PAST MEDICAL & SURGICAL HISTORY  Asthma      ALLERGIES:  No Known Allergies    MEDICATIONS:  STANDING MEDICATIONS  chlorhexidine 2% Cloths 1 Application(s) Topical daily  enoxaparin Injectable 40 milliGRAM(s) SubCutaneous every 24 hours  ezetimibe 10 milliGRAM(s) Oral daily  influenza  Vaccine (HIGH DOSE) 0.5 milliLiter(s) IntraMuscular once  lisinopril 5 milliGRAM(s) Oral daily  montelukast 10 milliGRAM(s) Oral daily  pantoprazole    Tablet 40 milliGRAM(s) Oral before breakfast    PRN MEDICATIONS  acetaminophen     Tablet .. 650 milliGRAM(s) Oral every 6 hours PRN  albuterol    90 MICROgram(s) HFA Inhaler 2 Puff(s) Inhalation every 6 hours PRN  aluminum hydroxide/magnesium hydroxide/simethicone Suspension 30 milliLiter(s) Oral every 4 hours PRN  melatonin 3 milliGRAM(s) Oral at bedtime PRN  ondansetron Injectable 4 milliGRAM(s) IV Push every 8 hours PRN    VITALS:   T(F): 99.1  HR: 78  BP: 120/52  RR: 18  SpO2: 98%    LABS:                        11.5   8.47  )-----------( 164      ( 25 Apr 2025 07:29 )             34.0     04-24    140  |  101  |  19  ----------------------------<  85  3.8   |  26  |  0.8    Ca    9.2      24 Apr 2025 16:00    TPro  7.2  /  Alb  4.4  /  TBili  0.5  /  DBili  x   /  AST  18  /  ALT  13  /  AlkPhos  84  04-24    PT/INR - ( 24 Apr 2025 16:00 )   PT: 12.30 sec;   INR: 1.04 ratio         PTT - ( 24 Apr 2025 16:00 )  PTT:29.8 sec  Urinalysis Basic - ( 25 Apr 2025 02:27 )    Color: Yellow / Appearance: Clear / SG: >1.030 / pH: x  Gluc: x / Ketone: Trace mg/dL  / Bili: Negative / Urobili: 0.2 mg/dL   Blood: x / Protein: Negative mg/dL / Nitrite: Negative   Leuk Esterase: Negative / RBC: 5 /HPF / WBC 0 /HPF   Sq Epi: x / Non Sq Epi: 0 /HPF / Bacteria: Negative /HPF            Urinalysis with Rflx Culture (collected 25 Apr 2025 02:27)          RADIOLOGY:    PHYSICAL EXAM:  GEN: No acute distress  LUNGS: Clear to auscultation bilaterally   HEART: S1/S2 present. RRR.   ABD/ GI: Soft, non-tender, non-distended. Bowel sounds present  EXT: NC/NC/NE/2+PP/SOTO  NEURO: AAOX3    
SUBJECTIVE:    Patient is a 85y old Female who presents with a chief complaint of   Currently admitted to medicine with the primary diagnosis of Fall       Today is hospital day 2d.     PAST MEDICAL & SURGICAL HISTORY  Asthma      ALLERGIES:  No Known Allergies    MEDICATIONS:  STANDING MEDICATIONS  chlorhexidine 2% Cloths 1 Application(s) Topical daily  enoxaparin Injectable 40 milliGRAM(s) SubCutaneous every 24 hours  ezetimibe 10 milliGRAM(s) Oral daily  influenza  Vaccine (HIGH DOSE) 0.5 milliLiter(s) IntraMuscular once  lisinopril 5 milliGRAM(s) Oral daily  montelukast 10 milliGRAM(s) Oral daily  pantoprazole    Tablet 40 milliGRAM(s) Oral before breakfast    PRN MEDICATIONS  acetaminophen     Tablet .. 650 milliGRAM(s) Oral every 6 hours PRN  albuterol    90 MICROgram(s) HFA Inhaler 2 Puff(s) Inhalation every 6 hours PRN  aluminum hydroxide/magnesium hydroxide/simethicone Suspension 30 milliLiter(s) Oral every 4 hours PRN  melatonin 3 milliGRAM(s) Oral at bedtime PRN  ondansetron Injectable 4 milliGRAM(s) IV Push every 8 hours PRN    VITALS:   T(F): 98.2  HR: 75  BP: 124/72  RR: 17  SpO2: 98%    LABS:                        11.7   8.76  )-----------( 176      ( 26 Apr 2025 08:27 )             34.7     04-26    142  |  107  |  24[H]  ----------------------------<  98  4.2   |  25  |  0.9    Ca    8.6      26 Apr 2025 08:27    TPro  6.7  /  Alb  3.9  /  TBili  0.7  /  DBili  x   /  AST  17  /  ALT  11  /  AlkPhos  71  04-26    PT/INR - ( 24 Apr 2025 16:00 )   PT: 12.30 sec;   INR: 1.04 ratio         PTT - ( 24 Apr 2025 16:00 )  PTT:29.8 sec  Urinalysis Basic - ( 26 Apr 2025 08:27 )    Color: x / Appearance: x / SG: x / pH: x  Gluc: 98 mg/dL / Ketone: x  / Bili: x / Urobili: x   Blood: x / Protein: x / Nitrite: x   Leuk Esterase: x / RBC: x / WBC x   Sq Epi: x / Non Sq Epi: x / Bacteria: x            Urinalysis with Rflx Culture (collected 25 Apr 2025 02:27)          RADIOLOGY:    PHYSICAL EXAM:  GEN: No acute distress  LUNGS: Clear to auscultation bilaterally   HEART: S1/S2 present. RRR.   ABD/ GI: Soft, non-tender, non-distended. Bowel sounds present  EXT: NC/NC/NE/2+PP/SOTO  NEURO: AAOX3    
SUBJECTIVE:    Patient is a 85y old Female who presents with a chief complaint of   Currently admitted to medicine with the primary diagnosis of Fall       Today is hospital day 3d.     PAST MEDICAL & SURGICAL HISTORY  Asthma      ALLERGIES:  No Known Allergies    MEDICATIONS:  STANDING MEDICATIONS  chlorhexidine 2% Cloths 1 Application(s) Topical daily  enoxaparin Injectable 40 milliGRAM(s) SubCutaneous every 24 hours  ezetimibe 10 milliGRAM(s) Oral daily  fluticasone propionate/ salmeterol 100-50 MICROgram(s) Diskus 1 Dose(s) Inhalation two times a day  influenza  Vaccine (HIGH DOSE) 0.5 milliLiter(s) IntraMuscular once  lisinopril 5 milliGRAM(s) Oral daily  pantoprazole    Tablet 40 milliGRAM(s) Oral before breakfast    PRN MEDICATIONS  acetaminophen     Tablet .. 650 milliGRAM(s) Oral every 6 hours PRN  albuterol    90 MICROgram(s) HFA Inhaler 2 Puff(s) Inhalation every 6 hours PRN  aluminum hydroxide/magnesium hydroxide/simethicone Suspension 30 milliLiter(s) Oral every 4 hours PRN  melatonin 3 milliGRAM(s) Oral at bedtime PRN  ondansetron Injectable 4 milliGRAM(s) IV Push every 8 hours PRN    VITALS:   T(F): 98.1  HR: 75  BP: 115/67  RR: 18  SpO2: 98%    LABS:                        11.7   8.76  )-----------( 176      ( 26 Apr 2025 08:27 )             34.7     04-26    142  |  107  |  24[H]  ----------------------------<  98  4.2   |  25  |  0.9    Ca    8.6      26 Apr 2025 08:27    TPro  6.7  /  Alb  3.9  /  TBili  0.7  /  DBili  x   /  AST  17  /  ALT  11  /  AlkPhos  71  04-26      Urinalysis Basic - ( 26 Apr 2025 08:27 )    Color: x / Appearance: x / SG: x / pH: x  Gluc: 98 mg/dL / Ketone: x  / Bili: x / Urobili: x   Blood: x / Protein: x / Nitrite: x   Leuk Esterase: x / RBC: x / WBC x   Sq Epi: x / Non Sq Epi: x / Bacteria: x            Urinalysis with Rflx Culture (collected 25 Apr 2025 02:27)          RADIOLOGY:    PHYSICAL EXAM:  GEN: No acute distress  LUNGS: Clear to auscultation bilaterally   HEART: S1/S2 present. RRR.   ABD/ GI: Soft, non-tender, non-distended. Bowel sounds present  EXT: NC/NC/NE/2+PP/SOTO  NEURO: AAOX3

## 2025-04-27 NOTE — PROGRESS NOTE ADULT - ASSESSMENT
85 years old female with PMHx of Asthma, severe MR, HTN, hx of kyphoplasty of the L1 vertebral body, recently on mirtazapine for insomnia (since 2 days), who was brought in by ambulance s/p mechanical unwitnessed fall and inability to ambulate after. In ED triage, family at bedside reporting that they came in this morning and found the patient on the floow crawling after sliding off the bed at 5 AM. Pt was initially getting up from bed to go to bathroom, and upon her return to bed she slipped off on her side and landed on her bottom.      Unwitnessed fall, likely mechanical  Possible metabolic encephalopathy from urinary retention vs. toxic encephalopathy from mirtazapine, now resolved--   Asthma  HTN    Plan    - patient is back to mental baseline, xrays and CTs negative for acute trauma, f/u B12, folate. as per daughter patient does not sleep all night and trail of mirtazapine was given which caused her get dizzy and she fell. can try melatonin at night-- DC monteleukast   was on floor for 4 hours -- no rhabdomyolysis noted  - retaining urine, -- karimi removed  cleared trail of void-- renal function is normal  - lisinopril to continue         # DVT PPX: lovenox   Family wants SNF-- daughter wants X ray of hip as patient has pain  on ambulation s/p fall

## 2025-04-28 ENCOUNTER — TRANSCRIPTION ENCOUNTER (OUTPATIENT)
Age: 85
End: 2025-04-28

## 2025-04-28 VITALS
SYSTOLIC BLOOD PRESSURE: 145 MMHG | OXYGEN SATURATION: 97 % | TEMPERATURE: 98 F | HEART RATE: 71 BPM | DIASTOLIC BLOOD PRESSURE: 64 MMHG

## 2025-04-28 LAB
ANION GAP SERPL CALC-SCNC: 11 MMOL/L — SIGNIFICANT CHANGE UP (ref 7–14)
BUN SERPL-MCNC: 22 MG/DL — HIGH (ref 10–20)
CALCIUM SERPL-MCNC: 8.7 MG/DL — SIGNIFICANT CHANGE UP (ref 8.4–10.5)
CHLORIDE SERPL-SCNC: 106 MMOL/L — SIGNIFICANT CHANGE UP (ref 98–110)
CO2 SERPL-SCNC: 22 MMOL/L — SIGNIFICANT CHANGE UP (ref 17–32)
CREAT SERPL-MCNC: 0.7 MG/DL — SIGNIFICANT CHANGE UP (ref 0.7–1.5)
EGFR: 84 ML/MIN/1.73M2 — SIGNIFICANT CHANGE UP
EGFR: 84 ML/MIN/1.73M2 — SIGNIFICANT CHANGE UP
GLUCOSE SERPL-MCNC: 101 MG/DL — HIGH (ref 70–99)
POTASSIUM SERPL-MCNC: 4.2 MMOL/L — SIGNIFICANT CHANGE UP (ref 3.5–5)
POTASSIUM SERPL-SCNC: 4.2 MMOL/L — SIGNIFICANT CHANGE UP (ref 3.5–5)
SODIUM SERPL-SCNC: 139 MMOL/L — SIGNIFICANT CHANGE UP (ref 135–146)

## 2025-04-28 PROCEDURE — 99239 HOSP IP/OBS DSCHRG MGMT >30: CPT

## 2025-04-28 RX ORDER — ALPRAZOLAM 0.5 MG
0.25 TABLET, EXTENDED RELEASE 24 HR ORAL ONCE
Refills: 0 | Status: DISCONTINUED | OUTPATIENT
Start: 2025-04-28 | End: 2025-04-28

## 2025-04-28 RX ORDER — ACETAMINOPHEN 500 MG/5ML
2 LIQUID (ML) ORAL
Qty: 0 | Refills: 0 | DISCHARGE

## 2025-04-28 RX ORDER — MELATONIN 5 MG
1 TABLET ORAL
Qty: 0 | Refills: 0 | DISCHARGE
Start: 2025-04-28

## 2025-04-28 RX ADMIN — Medication 0.25 MILLIGRAM(S): at 18:27

## 2025-04-28 RX ADMIN — Medication 1 APPLICATION(S): at 11:39

## 2025-04-28 RX ADMIN — ENOXAPARIN SODIUM 40 MILLIGRAM(S): 100 INJECTION SUBCUTANEOUS at 05:37

## 2025-04-28 RX ADMIN — EZETIMIBE 10 MILLIGRAM(S): 10 TABLET ORAL at 11:36

## 2025-04-28 RX ADMIN — LISINOPRIL 5 MILLIGRAM(S): 5 TABLET ORAL at 05:37

## 2025-04-28 RX ADMIN — Medication 1 DOSE(S): at 11:35

## 2025-04-28 RX ADMIN — Medication 40 MILLIGRAM(S): at 05:37

## 2025-04-28 NOTE — DISCHARGE NOTE PROVIDER - NSDCMRMEDTOKEN_GEN_ALL_CORE_FT
Albuterol (Eqv-ProAir HFA) 90 mcg/inh inhalation aerosol: 2 puff(s) inhaled every 6 hours, As Needed  Breo Ellipta 100 mcg-25 mcg/inh inhalation powder: 1 puff(s) inhaled once a day  ezetimibe 10 mg oral tablet: 1 tab(s) orally once a day  Icosapent Ethyl 1 g oral capsule: 2 cap(s) orally 2 times a day  ipratropium-albuterol 0.5 mg-2.5 mg/3 mL inhalation solution: 3 milliliter(s) by nebulizer every 6 hours as needed for  shortness of breath and/or wheezing  meclizine 25 mg oral tablet: 0.5 tab(s) orally 2 times a day as needed for  dizziness  melatonin 3 mg oral tablet: 1 tab(s) orally once a day (at bedtime) As needed Insomnia  Protonix 40 mg oral delayed release tablet: 1 tab(s) orally once a day  ramipril 5 mg oral capsule: 1 cap(s) orally once a day hold it for 3 days as with flu infection the blood pressure might drop   Albuterol (Eqv-ProAir HFA) 90 mcg/inh inhalation aerosol: 2 puff(s) inhaled every 6 hours, As Needed  Breo Ellipta 100 mcg-25 mcg/inh inhalation powder: 1 puff(s) inhaled once a day  ezetimibe 10 mg oral tablet: 1 tab(s) orally once a day  Icosapent Ethyl 1 g oral capsule: 2 cap(s) orally 2 times a day  ipratropium-albuterol 0.5 mg-2.5 mg/3 mL inhalation solution: 3 milliliter(s) by nebulizer every 6 hours as needed for  shortness of breath and/or wheezing  meclizine 25 mg oral tablet: 0.5 tab(s) orally 2 times a day as needed for  dizziness  melatonin 3 mg oral tablet: 1 tab(s) orally once a day (at bedtime) As needed Insomnia  Protonix 40 mg oral delayed release tablet: 1 tab(s) orally once a day  ramipril 5 mg oral capsule: 1 cap(s) orally once a day hold it for 3 days as with flu infection the blood pressure might drop  Tylenol 500 mg oral tablet: 2 tab(s) orally 2 times a day as needed for  moderate pain

## 2025-04-28 NOTE — DISCHARGE NOTE PROVIDER - HOSPITAL COURSE
85 years old female with PMHx of Asthma, severe MR, HTN, hx of kyphoplasty of the L1 vertebral body, recently on mirtazapine for insomnia (since 2 days), who was brought in by ambulance s/p mechanical unwitnessed fall and inability to ambulate after. In ED triage, family at bedside reporting that they came in this morning and found the patient on the floow crawling after sliding off the bed at 5 AM. Pt was initially getting up from bed to go to bathroom, and upon her return to bed she slipped off on her side and landed on her bottom.      Unwitnessed fall, likely mechanical  Possible metabolic encephalopathy from urinary retention vs. toxic encephalopathy from mirtazapine, now resolved--   Asthma  HTN    Plan    - patient is back to mental baseline, xrays and CTs negative for acute trauma, f/u B12, folate. as per daughter patient does not sleep all night and trail of mirtazapine was given which caused her get dizzy and she fell. can try melatonin at night-- DC monteleukast   was on floor for 4 hours -- no rhabdomyolysis noted  - retaining urine, -- karimi removed  cleared trail of void-- renal function is normal  - lisinopril to continue    Discussion of discharge plan of care, including discharge diagnoses, medication reconciliation, and follow-ups was conducted with Dr. Gallegos on 04/28/2025, and discharge was approved.

## 2025-04-28 NOTE — DISCHARGE NOTE NURSING/CASE MANAGEMENT/SOCIAL WORK - NSDCPEFALRISK_GEN_ALL_CORE
For information on Fall & Injury Prevention, visit: [email or web address] OR  [email or web address] OR  https://www.cdc.gov/steadi/patient.html

## 2025-04-28 NOTE — DISCHARGE NOTE PROVIDER - CARE PROVIDER_API CALL
Adán Webster  Internal Medicine  [address], [address]  [place] [address]  Phone: [phone]  Fax: [phone]  Follow Up Time: 2 weeks

## 2025-04-28 NOTE — DISCHARGE NOTE NURSING/CASE MANAGEMENT/SOCIAL WORK - PATIENT PORTAL LINK FT
You can access the FollowMyHealth Patient Portal offered by [facility] by registering at the following website: [email or web address]. By joining [employer]’s FollowMyHealth portal, you will also be able to view your health information using other applications (apps) compatible with our system.

## 2025-04-28 NOTE — DISCHARGE NOTE PROVIDER - NSDCFUSCHEDAPPT_GEN_ALL_CORE_FT
[facility]  ONCORTHO Alirio Pillai  Scheduled Appointment: 04/29/2025    [facility]  ONCORTHO Alirio Pillai  Scheduled Appointment: 05/06/2025

## 2025-04-28 NOTE — DISCHARGE NOTE NURSING/CASE MANAGEMENT/SOCIAL WORK - FINANCIAL ASSISTANCE
[facility] provides services at a reduced cost to those who are determined to be eligible through [facility]’s financial assistance program. Information regarding [facility]’s financial assistance program can be found by going to [email or web address] or by calling [phone].

## 2025-04-28 NOTE — CHART NOTE - NSCHARTNOTEFT_GEN_A_CORE
Patient would benefit from 24/7 [facility] services due to old age and deconditioning. She has asthma, hypertension, severe mitral regurgitation, prior kyphoplasty and physical deconditioning. She requires moderate assistance and a rolling walker as per physical therapy.

## 2025-04-28 NOTE — DISCHARGE NOTE PROVIDER - NSDCCPCAREPLAN_GEN_ALL_CORE_FT
PRINCIPAL DISCHARGE DIAGNOSIS  Diagnosis: Fall  Assessment and Plan of Treatment: You were admitted after a mechanical fall, this was due to you being dizzy and off balance because of mirtazapine. You were evaluated for any trauma and lab work and imagining was negative for any pathology. You need to follow up with your PCP in 1-2 weeks. Please use melatonin as needed for insomnia to prevent this from happening again.      SECONDARY DISCHARGE DIAGNOSES  Diagnosis: Unable to stand up  Assessment and Plan of Treatment:     Diagnosis: Hallucinations  Assessment and Plan of Treatment:

## 2025-04-29 ENCOUNTER — APPOINTMENT (OUTPATIENT)
Facility: CLINIC | Age: 85
End: 2025-04-29

## 2025-05-06 ENCOUNTER — APPOINTMENT (OUTPATIENT)
Facility: CLINIC | Age: 85
End: 2025-05-06

## 2025-06-12 ENCOUNTER — OUTPATIENT (OUTPATIENT)
Dept: OUTPATIENT SERVICES | Facility: HOSPITAL | Age: 85
LOS: 1 days | End: 2025-06-12

## 2025-06-12 DIAGNOSIS — K08.409 PARTIAL LOSS OF TEETH, UNSPECIFIED CAUSE, UNSPECIFIED CLASS: ICD-10-CM

## 2025-07-14 NOTE — PATIENT PROFILE ADULT - STATED REASON FOR ADMISSION
benefit, and side effects of prescribed medications.  All patient questions answered. Pt voiced understanding.  Reviewed health maintenance.  Instructed to continue current medications, diet and exercise.  Patient agreed with treatment plan. Follow up as directed.     Electronically signed by Cheyenne Peña PA-C on 7/15/2025 at 8:37 AM     weakness, lethargy, decreased activity